# Patient Record
Sex: MALE | Race: WHITE | Employment: OTHER | URBAN - NONMETROPOLITAN AREA
[De-identification: names, ages, dates, MRNs, and addresses within clinical notes are randomized per-mention and may not be internally consistent; named-entity substitution may affect disease eponyms.]

---

## 2017-07-24 ENCOUNTER — HOSPITAL ENCOUNTER (INPATIENT)
Age: 72
LOS: 4 days | Discharge: HOME | DRG: 247 | End: 2017-07-28
Attending: INTERNAL MEDICINE | Admitting: INTERNAL MEDICINE
Payer: OTHER GOVERNMENT

## 2017-07-24 LAB
GLUCOSE BLD-MCNC: 113 MG/DL (ref 70–108)
TROPONIN T: < 0.01 NG/ML

## 2017-07-24 PROCEDURE — 82948 REAGENT STRIP/BLOOD GLUCOSE: CPT

## 2017-07-24 PROCEDURE — 93005 ELECTROCARDIOGRAM TRACING: CPT

## 2017-07-24 PROCEDURE — 84484 ASSAY OF TROPONIN QUANT: CPT

## 2017-07-24 PROCEDURE — 6370000000 HC RX 637 (ALT 250 FOR IP): Performed by: INTERNAL MEDICINE

## 2017-07-24 PROCEDURE — 36415 COLL VENOUS BLD VENIPUNCTURE: CPT

## 2017-07-24 PROCEDURE — 2140000000 HC CCU INTERMEDIATE R&B

## 2017-07-24 PROCEDURE — 2580000003 HC RX 258: Performed by: INTERNAL MEDICINE

## 2017-07-24 RX ORDER — ONDANSETRON 2 MG/ML
4 INJECTION INTRAMUSCULAR; INTRAVENOUS EVERY 6 HOURS PRN
Status: DISCONTINUED | OUTPATIENT
Start: 2017-07-24 | End: 2017-07-26

## 2017-07-24 RX ORDER — POTASSIUM CHLORIDE 20MEQ/15ML
40 LIQUID (ML) ORAL PRN
Status: DISCONTINUED | OUTPATIENT
Start: 2017-07-24 | End: 2017-07-29 | Stop reason: HOSPADM

## 2017-07-24 RX ORDER — FACIAL-BODY WIPES
1 EACH TOPICAL DAILY
Status: DISCONTINUED | OUTPATIENT
Start: 2017-07-25 | End: 2017-07-25 | Stop reason: RX

## 2017-07-24 RX ORDER — NITROGLYCERIN 0.4 MG/1
0.4 TABLET SUBLINGUAL EVERY 5 MIN PRN
Status: DISCONTINUED | OUTPATIENT
Start: 2017-07-24 | End: 2017-07-29 | Stop reason: HOSPADM

## 2017-07-24 RX ORDER — METHOCARBAMOL 500 MG/1
500 TABLET, FILM COATED ORAL EVERY 6 HOURS PRN
Status: DISCONTINUED | OUTPATIENT
Start: 2017-07-24 | End: 2017-07-29 | Stop reason: HOSPADM

## 2017-07-24 RX ORDER — PIOGLITAZONEHYDROCHLORIDE 30 MG/1
30 TABLET ORAL DAILY
Status: DISCONTINUED | OUTPATIENT
Start: 2017-07-25 | End: 2017-07-29 | Stop reason: HOSPADM

## 2017-07-24 RX ORDER — PAROXETINE HYDROCHLORIDE 20 MG/1
20 TABLET, FILM COATED ORAL EVERY MORNING
Status: DISCONTINUED | OUTPATIENT
Start: 2017-07-25 | End: 2017-07-29 | Stop reason: HOSPADM

## 2017-07-24 RX ORDER — GABAPENTIN 300 MG/1
300 CAPSULE ORAL 3 TIMES DAILY
Status: DISCONTINUED | OUTPATIENT
Start: 2017-07-25 | End: 2017-07-26 | Stop reason: DRUGHIGH

## 2017-07-24 RX ORDER — ASPIRIN 81 MG/1
81 TABLET, CHEWABLE ORAL DAILY
Status: DISCONTINUED | OUTPATIENT
Start: 2017-07-25 | End: 2017-07-26

## 2017-07-24 RX ORDER — SODIUM CHLORIDE 0.9 % (FLUSH) 0.9 %
10 SYRINGE (ML) INJECTION EVERY 12 HOURS SCHEDULED
Status: DISCONTINUED | OUTPATIENT
Start: 2017-07-24 | End: 2017-07-27 | Stop reason: SDUPTHER

## 2017-07-24 RX ORDER — INSULIN GLARGINE 100 [IU]/ML
8 INJECTION, SOLUTION SUBCUTANEOUS NIGHTLY
Status: DISCONTINUED | OUTPATIENT
Start: 2017-07-24 | End: 2017-07-29 | Stop reason: HOSPADM

## 2017-07-24 RX ORDER — ALBUTEROL SULFATE 90 UG/1
2 AEROSOL, METERED RESPIRATORY (INHALATION) EVERY 12 HOURS
Status: DISCONTINUED | OUTPATIENT
Start: 2017-07-25 | End: 2017-07-26

## 2017-07-24 RX ORDER — NICOTINE POLACRILEX 4 MG
15 LOZENGE BUCCAL PRN
Status: DISCONTINUED | OUTPATIENT
Start: 2017-07-24 | End: 2017-07-29 | Stop reason: HOSPADM

## 2017-07-24 RX ORDER — DEXTROSE MONOHYDRATE 25 G/50ML
12.5 INJECTION, SOLUTION INTRAVENOUS PRN
Status: DISCONTINUED | OUTPATIENT
Start: 2017-07-24 | End: 2017-07-29 | Stop reason: HOSPADM

## 2017-07-24 RX ORDER — DEXTROSE MONOHYDRATE 50 MG/ML
100 INJECTION, SOLUTION INTRAVENOUS PRN
Status: DISCONTINUED | OUTPATIENT
Start: 2017-07-24 | End: 2017-07-29 | Stop reason: HOSPADM

## 2017-07-24 RX ORDER — SODIUM CHLORIDE 9 MG/ML
INJECTION, SOLUTION INTRAVENOUS CONTINUOUS
Status: DISCONTINUED | OUTPATIENT
Start: 2017-07-24 | End: 2017-07-29 | Stop reason: HOSPADM

## 2017-07-24 RX ORDER — DOCUSATE SODIUM 100 MG/1
100 CAPSULE, LIQUID FILLED ORAL 2 TIMES DAILY
Status: DISCONTINUED | OUTPATIENT
Start: 2017-07-25 | End: 2017-07-26 | Stop reason: DRUGHIGH

## 2017-07-24 RX ORDER — ACETAMINOPHEN 325 MG/1
650 TABLET ORAL EVERY 4 HOURS PRN
Status: DISCONTINUED | OUTPATIENT
Start: 2017-07-24 | End: 2017-07-29 | Stop reason: HOSPADM

## 2017-07-24 RX ORDER — BUDESONIDE AND FORMOTEROL FUMARATE DIHYDRATE 80; 4.5 UG/1; UG/1
2 AEROSOL RESPIRATORY (INHALATION) 2 TIMES DAILY
Status: DISCONTINUED | OUTPATIENT
Start: 2017-07-24 | End: 2017-07-24 | Stop reason: CLARIF

## 2017-07-24 RX ORDER — METHOCARBAMOL 500 MG/1
500 TABLET, FILM COATED ORAL EVERY 6 HOURS PRN
COMMUNITY
End: 2019-07-10 | Stop reason: ALTCHOICE

## 2017-07-24 RX ORDER — ATORVASTATIN CALCIUM 10 MG/1
10 TABLET, FILM COATED ORAL DAILY
Status: DISCONTINUED | OUTPATIENT
Start: 2017-07-25 | End: 2017-07-26 | Stop reason: DRUGHIGH

## 2017-07-24 RX ORDER — POTASSIUM CHLORIDE 20 MEQ/1
40 TABLET, EXTENDED RELEASE ORAL PRN
Status: DISCONTINUED | OUTPATIENT
Start: 2017-07-24 | End: 2017-07-29 | Stop reason: HOSPADM

## 2017-07-24 RX ORDER — POTASSIUM CHLORIDE 7.45 MG/ML
10 INJECTION INTRAVENOUS PRN
Status: DISCONTINUED | OUTPATIENT
Start: 2017-07-24 | End: 2017-07-29 | Stop reason: HOSPADM

## 2017-07-24 RX ORDER — SODIUM CHLORIDE 0.9 % (FLUSH) 0.9 %
10 SYRINGE (ML) INJECTION PRN
Status: DISCONTINUED | OUTPATIENT
Start: 2017-07-24 | End: 2017-07-27 | Stop reason: SDUPTHER

## 2017-07-24 RX ADMIN — SODIUM CHLORIDE: 9 INJECTION, SOLUTION INTRAVENOUS at 23:06

## 2017-07-24 RX ADMIN — Medication 10 ML: at 23:06

## 2017-07-24 RX ADMIN — GABAPENTIN 300 MG: 300 CAPSULE ORAL at 23:38

## 2017-07-24 RX ADMIN — DOCUSATE SODIUM 100 MG: 100 CAPSULE ORAL at 23:38

## 2017-07-24 ASSESSMENT — PAIN SCALES - GENERAL
PAINLEVEL_OUTOF10: 0
PAINLEVEL_OUTOF10: 0

## 2017-07-25 LAB
ABO: NORMAL
ALBUMIN SERPL-MCNC: 3.9 G/DL (ref 3.5–5.1)
ALP BLD-CCNC: 68 U/L (ref 38–126)
ALT SERPL-CCNC: 22 U/L (ref 11–66)
ANION GAP SERPL CALCULATED.3IONS-SCNC: 15 MEQ/L (ref 8–16)
ANTIBODY SCREEN: NORMAL
AST SERPL-CCNC: 22 U/L (ref 5–40)
BILIRUB SERPL-MCNC: 0.7 MG/DL (ref 0.3–1.2)
BILIRUBIN DIRECT: < 0.2 MG/DL (ref 0–0.3)
BUN BLDV-MCNC: 17 MG/DL (ref 7–22)
CALCIUM SERPL-MCNC: 8.7 MG/DL (ref 8.5–10.5)
CHLORIDE BLD-SCNC: 102 MEQ/L (ref 98–111)
CHOLESTEROL, TOTAL: 175 MG/DL (ref 100–199)
CO2: 22 MEQ/L (ref 23–33)
CREAT SERPL-MCNC: 1 MG/DL (ref 0.4–1.2)
GFR SERPL CREATININE-BSD FRML MDRD: 74 ML/MIN/1.73M2
GLUCOSE BLD-MCNC: 135 MG/DL (ref 70–108)
GLUCOSE BLD-MCNC: 140 MG/DL (ref 70–108)
GLUCOSE BLD-MCNC: 150 MG/DL (ref 70–108)
GLUCOSE BLD-MCNC: 296 MG/DL (ref 70–108)
GLUCOSE BLD-MCNC: 85 MG/DL (ref 70–108)
HCT VFR BLD CALC: 39.2 % (ref 42–52)
HDLC SERPL-MCNC: 22 MG/DL
HEMOGLOBIN: 13.8 GM/DL (ref 14–18)
INR BLD: 1.06 (ref 0.85–1.13)
LDL CHOLESTEROL CALCULATED: 89 MG/DL
MCH RBC QN AUTO: 32.3 PG (ref 27–31)
MCHC RBC AUTO-ENTMCNC: 35.2 GM/DL (ref 33–37)
MCV RBC AUTO: 91.7 FL (ref 80–94)
PDW BLD-RTO: 13.4 % (ref 11.5–14.5)
PLATELET # BLD: 181 THOU/MM3 (ref 130–400)
PMV BLD AUTO: 8.8 MCM (ref 7.4–10.4)
POTASSIUM SERPL-SCNC: 4.4 MEQ/L (ref 3.5–5.2)
RBC # BLD: 4.28 MILL/MM3 (ref 4.7–6.1)
RH FACTOR: NORMAL
SODIUM BLD-SCNC: 139 MEQ/L (ref 135–145)
TOTAL PROTEIN: 6.4 G/DL (ref 6.1–8)
TRIGL SERPL-MCNC: 322 MG/DL (ref 0–199)
TROPONIN T: < 0.01 NG/ML
WBC # BLD: 5.9 THOU/MM3 (ref 4.8–10.8)

## 2017-07-25 PROCEDURE — 6370000000 HC RX 637 (ALT 250 FOR IP): Performed by: INTERNAL MEDICINE

## 2017-07-25 PROCEDURE — 84484 ASSAY OF TROPONIN QUANT: CPT

## 2017-07-25 PROCEDURE — 80053 COMPREHEN METABOLIC PANEL: CPT

## 2017-07-25 PROCEDURE — 82248 BILIRUBIN DIRECT: CPT

## 2017-07-25 PROCEDURE — 86850 RBC ANTIBODY SCREEN: CPT

## 2017-07-25 PROCEDURE — 93005 ELECTROCARDIOGRAM TRACING: CPT

## 2017-07-25 PROCEDURE — 80061 LIPID PANEL: CPT

## 2017-07-25 PROCEDURE — 2580000003 HC RX 258: Performed by: INTERNAL MEDICINE

## 2017-07-25 PROCEDURE — 85027 COMPLETE CBC AUTOMATED: CPT

## 2017-07-25 PROCEDURE — 82948 REAGENT STRIP/BLOOD GLUCOSE: CPT

## 2017-07-25 PROCEDURE — 86900 BLOOD TYPING SEROLOGIC ABO: CPT

## 2017-07-25 PROCEDURE — 2140000000 HC CCU INTERMEDIATE R&B

## 2017-07-25 PROCEDURE — 36415 COLL VENOUS BLD VENIPUNCTURE: CPT

## 2017-07-25 PROCEDURE — 85610 PROTHROMBIN TIME: CPT

## 2017-07-25 PROCEDURE — 6360000002 HC RX W HCPCS: Performed by: INTERNAL MEDICINE

## 2017-07-25 PROCEDURE — 86901 BLOOD TYPING SEROLOGIC RH(D): CPT

## 2017-07-25 PROCEDURE — 94640 AIRWAY INHALATION TREATMENT: CPT

## 2017-07-25 RX ORDER — OMEPRAZOLE 20 MG/1
20 CAPSULE, DELAYED RELEASE ORAL DAILY
Status: ON HOLD | COMMUNITY
End: 2019-07-17 | Stop reason: HOSPADM

## 2017-07-25 RX ORDER — ATENOLOL 25 MG/1
25 TABLET ORAL DAILY
Status: DISCONTINUED | OUTPATIENT
Start: 2017-07-26 | End: 2017-07-29 | Stop reason: HOSPADM

## 2017-07-25 RX ORDER — ISOSORBIDE MONONITRATE 30 MG/1
30 TABLET, EXTENDED RELEASE ORAL DAILY
COMMUNITY
End: 2019-07-10 | Stop reason: ALTCHOICE

## 2017-07-25 RX ORDER — CYCLOBENZAPRINE HCL 10 MG
10 TABLET ORAL 3 TIMES DAILY PRN
Status: ON HOLD | COMMUNITY
End: 2018-08-24

## 2017-07-25 RX ORDER — CLOPIDOGREL BISULFATE 75 MG/1
75 TABLET ORAL DAILY
COMMUNITY
End: 2022-05-24 | Stop reason: SDUPTHER

## 2017-07-25 RX ORDER — NITROGLYCERIN 0.4 MG/1
0.4 TABLET SUBLINGUAL EVERY 5 MIN PRN
COMMUNITY
End: 2022-05-24 | Stop reason: SDUPTHER

## 2017-07-25 RX ORDER — NAPROXEN 500 MG/1
500 TABLET ORAL PRN
COMMUNITY

## 2017-07-25 RX ORDER — ATORVASTATIN CALCIUM 80 MG/1
80 TABLET, FILM COATED ORAL DAILY
COMMUNITY
End: 2022-05-24 | Stop reason: SDUPTHER

## 2017-07-25 RX ORDER — ONDANSETRON 4 MG/1
4 TABLET, FILM COATED ORAL EVERY 6 HOURS PRN
COMMUNITY
End: 2019-07-10 | Stop reason: ALTCHOICE

## 2017-07-25 RX ORDER — BUDESONIDE AND FORMOTEROL FUMARATE DIHYDRATE 160; 4.5 UG/1; UG/1
2 AEROSOL RESPIRATORY (INHALATION) 2 TIMES DAILY
Status: ON HOLD | COMMUNITY
End: 2022-06-23 | Stop reason: ALTCHOICE

## 2017-07-25 RX ORDER — ISOSORBIDE MONONITRATE 30 MG/1
30 TABLET, EXTENDED RELEASE ORAL DAILY
Status: DISCONTINUED | OUTPATIENT
Start: 2017-07-26 | End: 2017-07-29 | Stop reason: HOSPADM

## 2017-07-25 RX ORDER — CARVEDILOL 25 MG/1
12.5 TABLET ORAL 2 TIMES DAILY
Status: ON HOLD | COMMUNITY
End: 2019-08-14

## 2017-07-25 RX ORDER — DOCUSATE SODIUM 100 MG/1
100 CAPSULE, LIQUID FILLED ORAL DAILY PRN
COMMUNITY

## 2017-07-25 RX ORDER — CLOPIDOGREL BISULFATE 75 MG/1
75 TABLET ORAL DAILY
Status: DISCONTINUED | OUTPATIENT
Start: 2017-07-26 | End: 2017-07-29 | Stop reason: HOSPADM

## 2017-07-25 RX ORDER — LISINOPRIL 5 MG/1
5 TABLET ORAL DAILY
Status: DISCONTINUED | OUTPATIENT
Start: 2017-07-26 | End: 2017-07-29 | Stop reason: HOSPADM

## 2017-07-25 RX ORDER — LISINOPRIL 5 MG/1
2.5 TABLET ORAL DAILY
COMMUNITY
End: 2019-07-10 | Stop reason: ALTCHOICE

## 2017-07-25 RX ORDER — GABAPENTIN 600 MG/1
600 TABLET ORAL 3 TIMES DAILY
COMMUNITY

## 2017-07-25 RX ADMIN — Medication 6 UNITS: at 12:05

## 2017-07-25 RX ADMIN — ENOXAPARIN SODIUM 40 MG: 40 INJECTION SUBCUTANEOUS at 09:10

## 2017-07-25 RX ADMIN — ASPIRIN 81 MG: 81 TABLET, CHEWABLE ORAL at 09:10

## 2017-07-25 RX ADMIN — GABAPENTIN 300 MG: 300 CAPSULE ORAL at 09:10

## 2017-07-25 RX ADMIN — PAROXETINE HYDROCHLORIDE 20 MG: 20 TABLET, FILM COATED ORAL at 09:10

## 2017-07-25 RX ADMIN — METOPROLOL TARTRATE 25 MG: 25 TABLET, FILM COATED ORAL at 09:10

## 2017-07-25 RX ADMIN — ATORVASTATIN CALCIUM 10 MG: 10 TABLET, FILM COATED ORAL at 22:06

## 2017-07-25 RX ADMIN — DOCUSATE SODIUM 100 MG: 100 CAPSULE ORAL at 22:07

## 2017-07-25 RX ADMIN — Medication 2 PUFF: at 07:51

## 2017-07-25 RX ADMIN — PIOGLITAZONE 30 MG: 30 TABLET ORAL at 09:10

## 2017-07-25 RX ADMIN — GABAPENTIN 300 MG: 300 CAPSULE ORAL at 22:07

## 2017-07-25 RX ADMIN — GABAPENTIN 300 MG: 300 CAPSULE ORAL at 14:59

## 2017-07-25 RX ADMIN — METHOCARBAMOL 500 MG: 500 TABLET ORAL at 14:59

## 2017-07-25 RX ADMIN — DOCUSATE SODIUM 100 MG: 100 CAPSULE ORAL at 09:10

## 2017-07-25 RX ADMIN — Medication 10 ML: at 22:07

## 2017-07-25 RX ADMIN — Medication 2 PUFF: at 22:53

## 2017-07-25 ASSESSMENT — PAIN SCALES - GENERAL
PAINLEVEL_OUTOF10: 0

## 2017-07-26 ENCOUNTER — APPOINTMENT (OUTPATIENT)
Dept: CARDIAC CATH/INVASIVE PROCEDURES | Age: 72
DRG: 247 | End: 2017-07-26
Attending: INTERNAL MEDICINE
Payer: OTHER GOVERNMENT

## 2017-07-26 LAB
ANION GAP SERPL CALCULATED.3IONS-SCNC: 10 MEQ/L (ref 8–16)
BUN BLDV-MCNC: 18 MG/DL (ref 7–22)
CALCIUM SERPL-MCNC: 9.5 MG/DL (ref 8.5–10.5)
CHLORIDE BLD-SCNC: 102 MEQ/L (ref 98–111)
CO2: 27 MEQ/L (ref 23–33)
CREAT SERPL-MCNC: 0.9 MG/DL (ref 0.4–1.2)
EKG ATRIAL RATE: 56 BPM
EKG ATRIAL RATE: 59 BPM
EKG ATRIAL RATE: 60 BPM
EKG P AXIS: 73 DEGREES
EKG P AXIS: 76 DEGREES
EKG P AXIS: 80 DEGREES
EKG P-R INTERVAL: 160 MS
EKG P-R INTERVAL: 162 MS
EKG P-R INTERVAL: 166 MS
EKG Q-T INTERVAL: 424 MS
EKG Q-T INTERVAL: 448 MS
EKG Q-T INTERVAL: 456 MS
EKG QRS DURATION: 94 MS
EKG QRS DURATION: 96 MS
EKG QRS DURATION: 96 MS
EKG QTC CALCULATION (BAZETT): 432 MS
EKG QTC CALCULATION (BAZETT): 451 MS
EKG QTC CALCULATION (BAZETT): 460 MS
EKG R AXIS: 59 DEGREES
EKG R AXIS: 62 DEGREES
EKG R AXIS: 62 DEGREES
EKG T AXIS: 46 DEGREES
EKG T AXIS: 49 DEGREES
EKG T AXIS: 57 DEGREES
EKG VENTRICULAR RATE: 56 BPM
EKG VENTRICULAR RATE: 59 BPM
EKG VENTRICULAR RATE: 71 BPM
GFR SERPL CREATININE-BSD FRML MDRD: 83 ML/MIN/1.73M2
GLUCOSE BLD-MCNC: 138 MG/DL (ref 70–108)
GLUCOSE BLD-MCNC: 139 MG/DL (ref 70–108)
GLUCOSE BLD-MCNC: 184 MG/DL (ref 70–108)
GLUCOSE BLD-MCNC: 198 MG/DL (ref 70–108)
GLUCOSE BLD-MCNC: 220 MG/DL (ref 70–108)
HCT VFR BLD CALC: 43.5 % (ref 42–52)
HEMOGLOBIN: 14.7 GM/DL (ref 14–18)
INR BLD: 1.04 (ref 0.85–1.13)
LV EF: 43 %
LVEF MODALITY: NORMAL
MAGNESIUM: 1.8 MG/DL (ref 1.6–2.4)
MCH RBC QN AUTO: 31.2 PG (ref 27–31)
MCHC RBC AUTO-ENTMCNC: 33.9 GM/DL (ref 33–37)
MCV RBC AUTO: 92.3 FL (ref 80–94)
PDW BLD-RTO: 13.3 % (ref 11.5–14.5)
PHOSPHORUS: 3.8 MG/DL (ref 2.4–4.7)
PLATELET # BLD: 191 THOU/MM3 (ref 130–400)
PMV BLD AUTO: 9.2 MCM (ref 7.4–10.4)
POTASSIUM SERPL-SCNC: 5.2 MEQ/L (ref 3.5–5.2)
RBC # BLD: 4.72 MILL/MM3 (ref 4.7–6.1)
SODIUM BLD-SCNC: 139 MEQ/L (ref 135–145)
WBC # BLD: 8.4 THOU/MM3 (ref 4.8–10.8)

## 2017-07-26 PROCEDURE — A4216 STERILE WATER/SALINE, 10 ML: HCPCS

## 2017-07-26 PROCEDURE — C1894 INTRO/SHEATH, NON-LASER: HCPCS

## 2017-07-26 PROCEDURE — 85027 COMPLETE CBC AUTOMATED: CPT

## 2017-07-26 PROCEDURE — 82948 REAGENT STRIP/BLOOD GLUCOSE: CPT

## 2017-07-26 PROCEDURE — C1769 GUIDE WIRE: HCPCS

## 2017-07-26 PROCEDURE — C1760 CLOSURE DEV, VASC: HCPCS

## 2017-07-26 PROCEDURE — 2580000003 HC RX 258

## 2017-07-26 PROCEDURE — 93306 TTE W/DOPPLER COMPLETE: CPT

## 2017-07-26 PROCEDURE — 92979 ENDOLUMINL IVUS OCT C EA: CPT | Performed by: INTERNAL MEDICINE

## 2017-07-26 PROCEDURE — 2140000000 HC CCU INTERMEDIATE R&B

## 2017-07-26 PROCEDURE — 027034Z DILATION OF CORONARY ARTERY, ONE ARTERY WITH DRUG-ELUTING INTRALUMINAL DEVICE, PERCUTANEOUS APPROACH: ICD-10-PCS | Performed by: INTERNAL MEDICINE

## 2017-07-26 PROCEDURE — 6370000000 HC RX 637 (ALT 250 FOR IP): Performed by: INTERNAL MEDICINE

## 2017-07-26 PROCEDURE — 80048 BASIC METABOLIC PNL TOTAL CA: CPT

## 2017-07-26 PROCEDURE — 85610 PROTHROMBIN TIME: CPT

## 2017-07-26 PROCEDURE — C1753 CATH, INTRAVAS ULTRASOUND: HCPCS

## 2017-07-26 PROCEDURE — 84100 ASSAY OF PHOSPHORUS: CPT

## 2017-07-26 PROCEDURE — 92928 PRQ TCAT PLMT NTRAC ST 1 LES: CPT | Performed by: INTERNAL MEDICINE

## 2017-07-26 PROCEDURE — 93458 L HRT ARTERY/VENTRICLE ANGIO: CPT | Performed by: INTERNAL MEDICINE

## 2017-07-26 PROCEDURE — 83735 ASSAY OF MAGNESIUM: CPT

## 2017-07-26 PROCEDURE — 2500000003 HC RX 250 WO HCPCS

## 2017-07-26 PROCEDURE — C1725 CATH, TRANSLUMIN NON-LASER: HCPCS

## 2017-07-26 PROCEDURE — B2111ZZ FLUOROSCOPY OF MULTIPLE CORONARY ARTERIES USING LOW OSMOLAR CONTRAST: ICD-10-PCS | Performed by: INTERNAL MEDICINE

## 2017-07-26 PROCEDURE — 4A023N7 MEASUREMENT OF CARDIAC SAMPLING AND PRESSURE, LEFT HEART, PERCUTANEOUS APPROACH: ICD-10-PCS | Performed by: INTERNAL MEDICINE

## 2017-07-26 PROCEDURE — B2151ZZ FLUOROSCOPY OF LEFT HEART USING LOW OSMOLAR CONTRAST: ICD-10-PCS | Performed by: INTERNAL MEDICINE

## 2017-07-26 PROCEDURE — 2780000010 HC IMPLANT OTHER

## 2017-07-26 PROCEDURE — 92978 ENDOLUMINL IVUS OCT C 1ST: CPT | Performed by: INTERNAL MEDICINE

## 2017-07-26 PROCEDURE — 2580000003 HC RX 258: Performed by: INTERNAL MEDICINE

## 2017-07-26 PROCEDURE — 94640 AIRWAY INHALATION TREATMENT: CPT

## 2017-07-26 PROCEDURE — 36415 COLL VENOUS BLD VENIPUNCTURE: CPT

## 2017-07-26 PROCEDURE — C1874 STENT, COATED/COV W/DEL SYS: HCPCS

## 2017-07-26 PROCEDURE — C1887 CATHETER, GUIDING: HCPCS

## 2017-07-26 PROCEDURE — 6360000002 HC RX W HCPCS

## 2017-07-26 PROCEDURE — A6258 TRANSPARENT FILM >16<=48 IN: HCPCS

## 2017-07-26 RX ORDER — ATROPINE SULFATE 0.4 MG/ML
0.5 AMPUL (ML) INJECTION
Status: ACTIVE | OUTPATIENT
Start: 2017-07-26 | End: 2017-07-26

## 2017-07-26 RX ORDER — CARVEDILOL 6.25 MG/1
12.5 TABLET ORAL 2 TIMES DAILY
Status: DISCONTINUED | OUTPATIENT
Start: 2017-07-26 | End: 2017-07-29 | Stop reason: HOSPADM

## 2017-07-26 RX ORDER — ACETAMINOPHEN 325 MG/1
650 TABLET ORAL EVERY 4 HOURS PRN
Status: DISCONTINUED | OUTPATIENT
Start: 2017-07-26 | End: 2017-07-26

## 2017-07-26 RX ORDER — ISOSORBIDE MONONITRATE 30 MG/1
30 TABLET, EXTENDED RELEASE ORAL DAILY
Status: DISCONTINUED | OUTPATIENT
Start: 2017-07-26 | End: 2017-07-26 | Stop reason: SDUPTHER

## 2017-07-26 RX ORDER — SODIUM CHLORIDE 0.9 % (FLUSH) 0.9 %
10 SYRINGE (ML) INJECTION EVERY 12 HOURS SCHEDULED
Status: DISCONTINUED | OUTPATIENT
Start: 2017-07-26 | End: 2017-07-29 | Stop reason: HOSPADM

## 2017-07-26 RX ORDER — NAPROXEN 250 MG/1
500 TABLET ORAL 2 TIMES DAILY
Status: DISCONTINUED | OUTPATIENT
Start: 2017-07-26 | End: 2017-07-29 | Stop reason: HOSPADM

## 2017-07-26 RX ORDER — LISINOPRIL 5 MG/1
5 TABLET ORAL DAILY
Status: DISCONTINUED | OUTPATIENT
Start: 2017-07-26 | End: 2017-07-26 | Stop reason: SDUPTHER

## 2017-07-26 RX ORDER — ASPIRIN 325 MG
325 TABLET ORAL DAILY
Status: DISCONTINUED | OUTPATIENT
Start: 2017-07-27 | End: 2017-07-29 | Stop reason: HOSPADM

## 2017-07-26 RX ORDER — ATORVASTATIN CALCIUM 40 MG/1
40 TABLET, FILM COATED ORAL NIGHTLY
Status: DISCONTINUED | OUTPATIENT
Start: 2017-07-26 | End: 2017-07-29 | Stop reason: HOSPADM

## 2017-07-26 RX ORDER — DOCUSATE SODIUM 100 MG/1
100 CAPSULE, LIQUID FILLED ORAL DAILY
Status: DISCONTINUED | OUTPATIENT
Start: 2017-07-26 | End: 2017-07-29 | Stop reason: HOSPADM

## 2017-07-26 RX ORDER — CYCLOBENZAPRINE HCL 10 MG
10 TABLET ORAL 3 TIMES DAILY PRN
Status: DISCONTINUED | OUTPATIENT
Start: 2017-07-26 | End: 2017-07-29 | Stop reason: HOSPADM

## 2017-07-26 RX ORDER — ALBUTEROL SULFATE 90 UG/1
2 AEROSOL, METERED RESPIRATORY (INHALATION) 2 TIMES DAILY
Status: DISCONTINUED | OUTPATIENT
Start: 2017-07-26 | End: 2017-07-29 | Stop reason: HOSPADM

## 2017-07-26 RX ORDER — SODIUM CHLORIDE 0.9 % (FLUSH) 0.9 %
10 SYRINGE (ML) INJECTION PRN
Status: DISCONTINUED | OUTPATIENT
Start: 2017-07-26 | End: 2017-07-29 | Stop reason: HOSPADM

## 2017-07-26 RX ORDER — ONDANSETRON 2 MG/ML
4 INJECTION INTRAMUSCULAR; INTRAVENOUS EVERY 6 HOURS PRN
Status: DISCONTINUED | OUTPATIENT
Start: 2017-07-26 | End: 2017-07-29 | Stop reason: HOSPADM

## 2017-07-26 RX ORDER — AMIODARONE HYDROCHLORIDE 200 MG/1
200 TABLET ORAL 2 TIMES DAILY
Status: DISCONTINUED | OUTPATIENT
Start: 2017-07-26 | End: 2017-07-29 | Stop reason: HOSPADM

## 2017-07-26 RX ORDER — NITROGLYCERIN 0.4 MG/1
0.4 TABLET SUBLINGUAL EVERY 5 MIN PRN
Status: DISCONTINUED | OUTPATIENT
Start: 2017-07-26 | End: 2017-07-26 | Stop reason: SDUPTHER

## 2017-07-26 RX ORDER — GABAPENTIN 600 MG/1
600 TABLET ORAL 3 TIMES DAILY
Status: DISCONTINUED | OUTPATIENT
Start: 2017-07-26 | End: 2017-07-29 | Stop reason: HOSPADM

## 2017-07-26 RX ORDER — CLOPIDOGREL BISULFATE 75 MG/1
75 TABLET ORAL DAILY
Status: DISCONTINUED | OUTPATIENT
Start: 2017-07-26 | End: 2017-07-26 | Stop reason: SDUPTHER

## 2017-07-26 RX ORDER — ONDANSETRON 4 MG/1
4 TABLET, FILM COATED ORAL EVERY 6 HOURS PRN
Status: DISCONTINUED | OUTPATIENT
Start: 2017-07-26 | End: 2017-07-29 | Stop reason: HOSPADM

## 2017-07-26 RX ORDER — SODIUM CHLORIDE 9 MG/ML
INJECTION, SOLUTION INTRAVENOUS CONTINUOUS
Status: ACTIVE | OUTPATIENT
Start: 2017-07-26 | End: 2017-07-26

## 2017-07-26 RX ADMIN — Medication 2 UNITS: at 17:25

## 2017-07-26 RX ADMIN — AMIODARONE HYDROCHLORIDE 200 MG: 200 TABLET ORAL at 20:24

## 2017-07-26 RX ADMIN — ATENOLOL 25 MG: 25 TABLET ORAL at 12:21

## 2017-07-26 RX ADMIN — LISINOPRIL 5 MG: 5 TABLET ORAL at 12:21

## 2017-07-26 RX ADMIN — NAPROXEN 500 MG: 250 TABLET ORAL at 20:24

## 2017-07-26 RX ADMIN — Medication 10 ML: at 11:04

## 2017-07-26 RX ADMIN — ISOSORBIDE MONONITRATE 30 MG: 30 TABLET ORAL at 11:04

## 2017-07-26 RX ADMIN — AMIODARONE HYDROCHLORIDE 200 MG: 200 TABLET ORAL at 13:35

## 2017-07-26 RX ADMIN — DOCUSATE SODIUM 100 MG: 100 CAPSULE ORAL at 11:04

## 2017-07-26 RX ADMIN — CLOPIDOGREL 75 MG: 75 TABLET, FILM COATED ORAL at 06:54

## 2017-07-26 RX ADMIN — ATORVASTATIN CALCIUM 40 MG: 40 TABLET, FILM COATED ORAL at 20:24

## 2017-07-26 RX ADMIN — Medication 2 PUFF: at 20:42

## 2017-07-26 RX ADMIN — PAROXETINE HYDROCHLORIDE 20 MG: 20 TABLET, FILM COATED ORAL at 11:04

## 2017-07-26 RX ADMIN — SODIUM CHLORIDE: 9 INJECTION, SOLUTION INTRAVENOUS at 11:08

## 2017-07-26 RX ADMIN — GABAPENTIN 600 MG: 600 TABLET ORAL at 11:16

## 2017-07-26 RX ADMIN — GABAPENTIN 600 MG: 600 TABLET ORAL at 15:58

## 2017-07-26 RX ADMIN — ASPIRIN 81 MG: 81 TABLET, CHEWABLE ORAL at 06:54

## 2017-07-26 RX ADMIN — Medication 10 ML: at 20:24

## 2017-07-26 RX ADMIN — PIOGLITAZONE 30 MG: 30 TABLET ORAL at 11:05

## 2017-07-26 RX ADMIN — CARVEDILOL 12.5 MG: 6.25 TABLET, FILM COATED ORAL at 11:16

## 2017-07-26 RX ADMIN — INSULIN GLARGINE 8 UNITS: 100 INJECTION, SOLUTION SUBCUTANEOUS at 21:30

## 2017-07-26 RX ADMIN — GABAPENTIN 600 MG: 600 TABLET ORAL at 20:23

## 2017-07-26 RX ADMIN — Medication 4 UNITS: at 21:31

## 2017-07-26 ASSESSMENT — PAIN SCALES - GENERAL
PAINLEVEL_OUTOF10: 0

## 2017-07-27 LAB
GLUCOSE BLD-MCNC: 128 MG/DL (ref 70–108)
GLUCOSE BLD-MCNC: 136 MG/DL (ref 70–108)
GLUCOSE BLD-MCNC: 156 MG/DL (ref 70–108)
GLUCOSE BLD-MCNC: 164 MG/DL (ref 70–108)

## 2017-07-27 PROCEDURE — 6370000000 HC RX 637 (ALT 250 FOR IP): Performed by: INTERNAL MEDICINE

## 2017-07-27 PROCEDURE — 6360000002 HC RX W HCPCS: Performed by: INTERNAL MEDICINE

## 2017-07-27 PROCEDURE — 2580000003 HC RX 258: Performed by: INTERNAL MEDICINE

## 2017-07-27 PROCEDURE — 82948 REAGENT STRIP/BLOOD GLUCOSE: CPT

## 2017-07-27 PROCEDURE — 94640 AIRWAY INHALATION TREATMENT: CPT

## 2017-07-27 PROCEDURE — 2140000000 HC CCU INTERMEDIATE R&B

## 2017-07-27 RX ADMIN — CLOPIDOGREL 75 MG: 75 TABLET, FILM COATED ORAL at 09:42

## 2017-07-27 RX ADMIN — Medication 2 UNITS: at 21:41

## 2017-07-27 RX ADMIN — DOCUSATE SODIUM 100 MG: 100 CAPSULE ORAL at 09:42

## 2017-07-27 RX ADMIN — Medication 2 UNITS: at 12:36

## 2017-07-27 RX ADMIN — Medication 2 PUFF: at 10:53

## 2017-07-27 RX ADMIN — ATENOLOL 25 MG: 25 TABLET ORAL at 12:36

## 2017-07-27 RX ADMIN — Medication 10 ML: at 09:43

## 2017-07-27 RX ADMIN — CARVEDILOL 12.5 MG: 6.25 TABLET, FILM COATED ORAL at 07:40

## 2017-07-27 RX ADMIN — LISINOPRIL 5 MG: 5 TABLET ORAL at 12:36

## 2017-07-27 RX ADMIN — Medication 2 PUFF: at 21:45

## 2017-07-27 RX ADMIN — GABAPENTIN 600 MG: 600 TABLET ORAL at 20:32

## 2017-07-27 RX ADMIN — PAROXETINE HYDROCHLORIDE 20 MG: 20 TABLET, FILM COATED ORAL at 09:42

## 2017-07-27 RX ADMIN — AMIODARONE HYDROCHLORIDE 200 MG: 200 TABLET ORAL at 20:32

## 2017-07-27 RX ADMIN — ENOXAPARIN SODIUM 40 MG: 40 INJECTION SUBCUTANEOUS at 09:42

## 2017-07-27 RX ADMIN — ISOSORBIDE MONONITRATE 30 MG: 30 TABLET ORAL at 07:40

## 2017-07-27 RX ADMIN — GABAPENTIN 600 MG: 600 TABLET ORAL at 09:42

## 2017-07-27 RX ADMIN — Medication 10 ML: at 20:32

## 2017-07-27 RX ADMIN — ASPIRIN 325 MG: 325 TABLET, COATED ORAL at 09:42

## 2017-07-27 RX ADMIN — NAPROXEN 500 MG: 250 TABLET ORAL at 20:31

## 2017-07-27 RX ADMIN — AMIODARONE HYDROCHLORIDE 200 MG: 200 TABLET ORAL at 09:42

## 2017-07-27 RX ADMIN — ATORVASTATIN CALCIUM 40 MG: 40 TABLET, FILM COATED ORAL at 20:32

## 2017-07-27 RX ADMIN — PIOGLITAZONE 30 MG: 30 TABLET ORAL at 09:42

## 2017-07-27 RX ADMIN — CARVEDILOL 12.5 MG: 6.25 TABLET, FILM COATED ORAL at 20:32

## 2017-07-27 RX ADMIN — INSULIN GLARGINE 8 UNITS: 100 INJECTION, SOLUTION SUBCUTANEOUS at 21:41

## 2017-07-27 ASSESSMENT — PAIN SCALES - GENERAL: PAINLEVEL_OUTOF10: 0

## 2017-07-28 VITALS
SYSTOLIC BLOOD PRESSURE: 115 MMHG | HEIGHT: 72 IN | TEMPERATURE: 97.8 F | RESPIRATION RATE: 18 BRPM | OXYGEN SATURATION: 97 % | BODY MASS INDEX: 30.12 KG/M2 | HEART RATE: 74 BPM | WEIGHT: 222.4 LBS | DIASTOLIC BLOOD PRESSURE: 55 MMHG

## 2017-07-28 PROBLEM — E66.09 OBESITY DUE TO EXCESS CALORIES: Status: ACTIVE | Noted: 2017-07-28

## 2017-07-28 PROBLEM — Z86.79 H/O CLASS III ANGINA PECTORIS: Status: ACTIVE | Noted: 2017-07-28

## 2017-07-28 PROBLEM — I48.0 PAROXYSMAL ATRIAL FIBRILLATION (HCC): Status: ACTIVE | Noted: 2017-07-28

## 2017-07-28 PROBLEM — I49.9 VENTRICULAR ARRHYTHMIA: Status: ACTIVE | Noted: 2017-07-28

## 2017-07-28 LAB
EKG ATRIAL RATE: 72 BPM
EKG P AXIS: 63 DEGREES
EKG P-R INTERVAL: 170 MS
EKG Q-T INTERVAL: 400 MS
EKG QRS DURATION: 98 MS
EKG QTC CALCULATION (BAZETT): 438 MS
EKG R AXIS: 57 DEGREES
EKG T AXIS: 61 DEGREES
EKG VENTRICULAR RATE: 72 BPM
GLUCOSE BLD-MCNC: 100 MG/DL (ref 70–108)
GLUCOSE BLD-MCNC: 128 MG/DL (ref 70–108)
GLUCOSE BLD-MCNC: 133 MG/DL (ref 70–108)
GLUCOSE BLD-MCNC: 214 MG/DL (ref 70–108)

## 2017-07-28 PROCEDURE — 2580000003 HC RX 258: Performed by: INTERNAL MEDICINE

## 2017-07-28 PROCEDURE — 82948 REAGENT STRIP/BLOOD GLUCOSE: CPT

## 2017-07-28 PROCEDURE — 94640 AIRWAY INHALATION TREATMENT: CPT

## 2017-07-28 PROCEDURE — 93005 ELECTROCARDIOGRAM TRACING: CPT

## 2017-07-28 PROCEDURE — 6370000000 HC RX 637 (ALT 250 FOR IP): Performed by: INTERNAL MEDICINE

## 2017-07-28 PROCEDURE — 6360000002 HC RX W HCPCS: Performed by: INTERNAL MEDICINE

## 2017-07-28 RX ORDER — AMIODARONE HYDROCHLORIDE 200 MG/1
200 TABLET ORAL 2 TIMES DAILY
Qty: 60 TABLET | Refills: 3 | Status: ON HOLD | OUTPATIENT
Start: 2017-07-28 | End: 2021-10-07 | Stop reason: ALTCHOICE

## 2017-07-28 RX ORDER — MORPHINE SULFATE 2 MG/ML
2 INJECTION, SOLUTION INTRAMUSCULAR; INTRAVENOUS EVERY 4 HOURS PRN
Status: DISCONTINUED | OUTPATIENT
Start: 2017-07-28 | End: 2017-07-29 | Stop reason: HOSPADM

## 2017-07-28 RX ORDER — BUMETANIDE 2 MG/1
2 TABLET ORAL DAILY
Qty: 30 TABLET | Refills: 3 | Status: ON HOLD | OUTPATIENT
Start: 2017-07-28 | End: 2019-07-18 | Stop reason: SDUPTHER

## 2017-07-28 RX ORDER — POTASSIUM CHLORIDE 20 MEQ/1
20 TABLET, EXTENDED RELEASE ORAL DAILY
Qty: 60 TABLET | Refills: 3 | Status: SHIPPED | OUTPATIENT
Start: 2017-07-28 | End: 2019-07-10 | Stop reason: ALTCHOICE

## 2017-07-28 RX ADMIN — Medication 2 PUFF: at 19:44

## 2017-07-28 RX ADMIN — GABAPENTIN 600 MG: 600 TABLET ORAL at 10:20

## 2017-07-28 RX ADMIN — PIOGLITAZONE 30 MG: 30 TABLET ORAL at 10:21

## 2017-07-28 RX ADMIN — Medication 2 PUFF: at 10:02

## 2017-07-28 RX ADMIN — LISINOPRIL 5 MG: 5 TABLET ORAL at 10:22

## 2017-07-28 RX ADMIN — NITROGLYCERIN 0.4 MG: 0.4 TABLET SUBLINGUAL at 00:24

## 2017-07-28 RX ADMIN — ATORVASTATIN CALCIUM 40 MG: 40 TABLET, FILM COATED ORAL at 21:01

## 2017-07-28 RX ADMIN — MORPHINE SULFATE 2 MG: 2 INJECTION, SOLUTION INTRAMUSCULAR; INTRAVENOUS at 01:35

## 2017-07-28 RX ADMIN — NAPROXEN 500 MG: 250 TABLET ORAL at 10:21

## 2017-07-28 RX ADMIN — NITROGLYCERIN 0.4 MG: 0.4 TABLET SUBLINGUAL at 00:55

## 2017-07-28 RX ADMIN — CARVEDILOL 12.5 MG: 6.25 TABLET, FILM COATED ORAL at 21:01

## 2017-07-28 RX ADMIN — NITROGLYCERIN 0.4 MG: 0.4 TABLET SUBLINGUAL at 00:33

## 2017-07-28 RX ADMIN — GABAPENTIN 600 MG: 600 TABLET ORAL at 15:39

## 2017-07-28 RX ADMIN — ATENOLOL 25 MG: 25 TABLET ORAL at 12:51

## 2017-07-28 RX ADMIN — DOCUSATE SODIUM 100 MG: 100 CAPSULE ORAL at 10:20

## 2017-07-28 RX ADMIN — AMIODARONE HYDROCHLORIDE 200 MG: 200 TABLET ORAL at 21:01

## 2017-07-28 RX ADMIN — AMIODARONE HYDROCHLORIDE 200 MG: 200 TABLET ORAL at 10:18

## 2017-07-28 RX ADMIN — GABAPENTIN 600 MG: 600 TABLET ORAL at 21:01

## 2017-07-28 RX ADMIN — NAPROXEN 500 MG: 250 TABLET ORAL at 21:01

## 2017-07-28 RX ADMIN — INSULIN GLARGINE 8 UNITS: 100 INJECTION, SOLUTION SUBCUTANEOUS at 21:02

## 2017-07-28 RX ADMIN — Medication 10 ML: at 10:21

## 2017-07-28 RX ADMIN — Medication 4 UNITS: at 21:01

## 2017-07-28 RX ADMIN — CLOPIDOGREL 75 MG: 75 TABLET, FILM COATED ORAL at 10:20

## 2017-07-28 RX ADMIN — ISOSORBIDE MONONITRATE 30 MG: 30 TABLET ORAL at 12:51

## 2017-07-28 RX ADMIN — CARVEDILOL 12.5 MG: 6.25 TABLET, FILM COATED ORAL at 10:18

## 2017-07-28 RX ADMIN — ASPIRIN 325 MG: 325 TABLET, COATED ORAL at 10:18

## 2017-07-28 RX ADMIN — PAROXETINE HYDROCHLORIDE 20 MG: 20 TABLET, FILM COATED ORAL at 10:21

## 2017-07-28 ASSESSMENT — PAIN SCALES - GENERAL
PAINLEVEL_OUTOF10: 0
PAINLEVEL_OUTOF10: 5
PAINLEVEL_OUTOF10: 0
PAINLEVEL_OUTOF10: 5
PAINLEVEL_OUTOF10: 0
PAINLEVEL_OUTOF10: 0

## 2017-07-28 ASSESSMENT — PAIN DESCRIPTION - PAIN TYPE: TYPE: ACUTE PAIN

## 2017-07-28 ASSESSMENT — PAIN DESCRIPTION - LOCATION: LOCATION: CHEST

## 2017-07-28 ASSESSMENT — PAIN DESCRIPTION - ORIENTATION: ORIENTATION: MID

## 2017-07-28 ASSESSMENT — PAIN DESCRIPTION - DESCRIPTORS: DESCRIPTORS: PRESSURE

## 2017-08-03 ENCOUNTER — HOSPITAL ENCOUNTER (EMERGENCY)
Age: 72
Discharge: HOME | End: 2017-08-04
Payer: MEDICARE

## 2017-08-03 ENCOUNTER — APPOINTMENT (OUTPATIENT)
Dept: GENERAL RADIOLOGY | Age: 72
End: 2017-08-03
Payer: MEDICARE

## 2017-08-03 DIAGNOSIS — E86.0 DEHYDRATION: Primary | ICD-10-CM

## 2017-08-03 LAB
ALBUMIN SERPL-MCNC: 3.8 G/DL (ref 3.5–5.1)
ALP BLD-CCNC: 73 U/L (ref 38–126)
ALT SERPL-CCNC: 14 U/L (ref 11–66)
ANION GAP SERPL CALCULATED.3IONS-SCNC: 13 MEQ/L (ref 8–16)
AST SERPL-CCNC: 17 U/L (ref 5–40)
BASOPHILS # BLD: 0.3 %
BASOPHILS ABSOLUTE: 0 THOU/MM3 (ref 0–0.1)
BILIRUB SERPL-MCNC: 0.4 MG/DL (ref 0.3–1.2)
BUN BLDV-MCNC: 36 MG/DL (ref 7–22)
CALCIUM SERPL-MCNC: 7.5 MG/DL (ref 8.5–10.5)
CHLORIDE BLD-SCNC: 93 MEQ/L (ref 98–111)
CO2: 26 MEQ/L (ref 23–33)
CREAT SERPL-MCNC: 2.3 MG/DL (ref 0.4–1.2)
EOSINOPHIL # BLD: 2.8 %
EOSINOPHILS ABSOLUTE: 0.2 THOU/MM3 (ref 0–0.4)
GFR SERPL CREATININE-BSD FRML MDRD: 28 ML/MIN/1.73M2
GLUCOSE BLD-MCNC: 130 MG/DL (ref 70–108)
HCT VFR BLD CALC: 35.3 % (ref 42–52)
HEMOGLOBIN: 12 GM/DL (ref 14–18)
LACTIC ACID: 1.2 MMOL/L (ref 0.5–2.2)
LYMPHOCYTES # BLD: 24.4 %
LYMPHOCYTES ABSOLUTE: 1.9 THOU/MM3 (ref 1–4.8)
MCH RBC QN AUTO: 31.6 PG (ref 27–31)
MCHC RBC AUTO-ENTMCNC: 34.1 GM/DL (ref 33–37)
MCV RBC AUTO: 92.6 FL (ref 80–94)
MONOCYTES # BLD: 6.9 %
MONOCYTES ABSOLUTE: 0.5 THOU/MM3 (ref 0.4–1.3)
NUCLEATED RED BLOOD CELLS: 0 /100 WBC
OSMOLALITY CALCULATION: 274.6 MOSMOL/KG (ref 275–300)
PDW BLD-RTO: 13.4 % (ref 11.5–14.5)
PLATELET # BLD: 201 THOU/MM3 (ref 130–400)
PMV BLD AUTO: 9.3 MCM (ref 7.4–10.4)
POTASSIUM SERPL-SCNC: 4.3 MEQ/L (ref 3.5–5.2)
RBC # BLD: 3.81 MILL/MM3 (ref 4.7–6.1)
RBC # BLD: NORMAL 10*6/UL
SEG NEUTROPHILS: 65.6 %
SEGMENTED NEUTROPHILS ABSOLUTE COUNT: 5 THOU/MM3 (ref 1.8–7.7)
SODIUM BLD-SCNC: 132 MEQ/L (ref 135–145)
TOTAL PROTEIN: 6 G/DL (ref 6.1–8)
WBC # BLD: 7.6 THOU/MM3 (ref 4.8–10.8)

## 2017-08-03 PROCEDURE — 87040 BLOOD CULTURE FOR BACTERIA: CPT

## 2017-08-03 PROCEDURE — 36415 COLL VENOUS BLD VENIPUNCTURE: CPT

## 2017-08-03 PROCEDURE — 71020 XR CHEST STANDARD TWO VW: CPT

## 2017-08-03 PROCEDURE — 83605 ASSAY OF LACTIC ACID: CPT

## 2017-08-03 PROCEDURE — 99285 EMERGENCY DEPT VISIT HI MDM: CPT

## 2017-08-03 PROCEDURE — 80053 COMPREHEN METABOLIC PANEL: CPT

## 2017-08-03 PROCEDURE — 96360 HYDRATION IV INFUSION INIT: CPT

## 2017-08-03 PROCEDURE — 85025 COMPLETE CBC W/AUTO DIFF WBC: CPT

## 2017-08-03 PROCEDURE — 2580000003 HC RX 258: Performed by: PHYSICIAN ASSISTANT

## 2017-08-03 PROCEDURE — 96361 HYDRATE IV INFUSION ADD-ON: CPT

## 2017-08-03 PROCEDURE — 93005 ELECTROCARDIOGRAM TRACING: CPT

## 2017-08-03 RX ORDER — 0.9 % SODIUM CHLORIDE 0.9 %
1000 INTRAVENOUS SOLUTION INTRAVENOUS ONCE
Status: COMPLETED | OUTPATIENT
Start: 2017-08-03 | End: 2017-08-03

## 2017-08-03 RX ADMIN — SODIUM CHLORIDE 1000 ML: 9 INJECTION, SOLUTION INTRAVENOUS at 21:00

## 2017-08-03 ASSESSMENT — ENCOUNTER SYMPTOMS
RHINORRHEA: 0
ABDOMINAL DISTENTION: 1
DIARRHEA: 0
SHORTNESS OF BREATH: 0
EYE DISCHARGE: 0
SORE THROAT: 0
EYE REDNESS: 0
BACK PAIN: 0
COUGH: 0
NAUSEA: 0
WHEEZING: 0
VOMITING: 0
ABDOMINAL PAIN: 0

## 2017-08-04 VITALS
HEIGHT: 72 IN | WEIGHT: 222 LBS | OXYGEN SATURATION: 94 % | TEMPERATURE: 98.5 F | HEART RATE: 62 BPM | BODY MASS INDEX: 30.07 KG/M2 | SYSTOLIC BLOOD PRESSURE: 110 MMHG | DIASTOLIC BLOOD PRESSURE: 75 MMHG | RESPIRATION RATE: 16 BRPM

## 2017-08-04 LAB
EKG ATRIAL RATE: 67 BPM
EKG P AXIS: 69 DEGREES
EKG P-R INTERVAL: 168 MS
EKG Q-T INTERVAL: 418 MS
EKG QRS DURATION: 106 MS
EKG QTC CALCULATION (BAZETT): 441 MS
EKG R AXIS: 64 DEGREES
EKG T AXIS: 17 DEGREES
EKG VENTRICULAR RATE: 67 BPM

## 2017-08-09 LAB
BLOOD CULTURE, ROUTINE: NORMAL
BLOOD CULTURE, ROUTINE: NORMAL

## 2017-08-10 ENCOUNTER — HOSPITAL ENCOUNTER (OUTPATIENT)
Dept: CARDIAC REHAB | Age: 72
Setting detail: THERAPIES SERIES
Discharge: HOME | End: 2017-08-10
Payer: MEDICARE

## 2017-08-10 VITALS — BODY MASS INDEX: 30.37 KG/M2 | WEIGHT: 224.25 LBS | HEIGHT: 72 IN

## 2017-08-10 PROCEDURE — G0423 INTENS CARDIAC REHAB NO EXER: HCPCS

## 2017-08-21 ENCOUNTER — HOSPITAL ENCOUNTER (OUTPATIENT)
Dept: CARDIAC REHAB | Age: 72
Setting detail: THERAPIES SERIES
Discharge: HOME | End: 2017-08-21
Payer: MEDICARE

## 2017-08-21 PROCEDURE — G0422 INTENS CARDIAC REHAB W/EXERC: HCPCS

## 2017-08-21 PROCEDURE — G0423 INTENS CARDIAC REHAB NO EXER: HCPCS

## 2017-08-23 ENCOUNTER — HOSPITAL ENCOUNTER (OUTPATIENT)
Dept: CARDIAC REHAB | Age: 72
Setting detail: THERAPIES SERIES
Discharge: HOME | End: 2017-08-23
Payer: MEDICARE

## 2017-08-23 ENCOUNTER — TELEPHONE (OUTPATIENT)
Dept: CARDIAC REHAB | Age: 72
End: 2017-08-23

## 2017-08-23 PROCEDURE — G0423 INTENS CARDIAC REHAB NO EXER: HCPCS

## 2017-08-23 PROCEDURE — G0422 INTENS CARDIAC REHAB W/EXERC: HCPCS

## 2017-08-25 ENCOUNTER — HOSPITAL ENCOUNTER (OUTPATIENT)
Dept: CARDIAC REHAB | Age: 72
Setting detail: THERAPIES SERIES
Discharge: HOME | End: 2017-08-25
Payer: MEDICARE

## 2017-08-25 PROCEDURE — G0422 INTENS CARDIAC REHAB W/EXERC: HCPCS

## 2017-08-25 PROCEDURE — G0423 INTENS CARDIAC REHAB NO EXER: HCPCS

## 2017-08-28 ENCOUNTER — HOSPITAL ENCOUNTER (OUTPATIENT)
Dept: CARDIAC REHAB | Age: 72
Setting detail: THERAPIES SERIES
Discharge: HOME | End: 2017-08-28
Payer: MEDICARE

## 2017-08-28 PROCEDURE — G0423 INTENS CARDIAC REHAB NO EXER: HCPCS

## 2017-08-28 PROCEDURE — G0422 INTENS CARDIAC REHAB W/EXERC: HCPCS

## 2017-08-30 ENCOUNTER — HOSPITAL ENCOUNTER (OUTPATIENT)
Dept: CARDIAC REHAB | Age: 72
Setting detail: THERAPIES SERIES
Discharge: HOME | End: 2017-08-30
Payer: MEDICARE

## 2017-08-30 PROCEDURE — G0423 INTENS CARDIAC REHAB NO EXER: HCPCS

## 2017-08-30 PROCEDURE — G0422 INTENS CARDIAC REHAB W/EXERC: HCPCS

## 2017-09-01 ENCOUNTER — HOSPITAL ENCOUNTER (OUTPATIENT)
Dept: CARDIAC REHAB | Age: 72
Setting detail: THERAPIES SERIES
Discharge: HOME | End: 2017-09-01
Payer: MEDICARE

## 2017-09-01 PROCEDURE — G0422 INTENS CARDIAC REHAB W/EXERC: HCPCS

## 2017-09-01 PROCEDURE — G0423 INTENS CARDIAC REHAB NO EXER: HCPCS

## 2017-09-04 ENCOUNTER — APPOINTMENT (OUTPATIENT)
Dept: CARDIAC REHAB | Age: 72
End: 2017-09-04
Payer: MEDICARE

## 2017-09-06 ENCOUNTER — HOSPITAL ENCOUNTER (OUTPATIENT)
Dept: CARDIAC REHAB | Age: 72
Setting detail: THERAPIES SERIES
Discharge: HOME | End: 2017-09-06
Payer: MEDICARE

## 2017-09-06 PROCEDURE — G0422 INTENS CARDIAC REHAB W/EXERC: HCPCS

## 2017-09-06 PROCEDURE — G0423 INTENS CARDIAC REHAB NO EXER: HCPCS

## 2017-09-08 ENCOUNTER — HOSPITAL ENCOUNTER (OUTPATIENT)
Dept: CARDIAC REHAB | Age: 72
Setting detail: THERAPIES SERIES
Discharge: HOME | End: 2017-09-08
Payer: MEDICARE

## 2017-09-08 PROCEDURE — G0423 INTENS CARDIAC REHAB NO EXER: HCPCS

## 2017-09-08 PROCEDURE — G0422 INTENS CARDIAC REHAB W/EXERC: HCPCS

## 2017-09-11 ENCOUNTER — HOSPITAL ENCOUNTER (OUTPATIENT)
Dept: CARDIAC REHAB | Age: 72
Setting detail: THERAPIES SERIES
Discharge: HOME | End: 2017-09-11
Payer: MEDICARE

## 2017-09-11 PROCEDURE — G0423 INTENS CARDIAC REHAB NO EXER: HCPCS

## 2017-09-11 PROCEDURE — G0422 INTENS CARDIAC REHAB W/EXERC: HCPCS

## 2017-09-13 ENCOUNTER — HOSPITAL ENCOUNTER (OUTPATIENT)
Dept: CARDIAC REHAB | Age: 72
Setting detail: THERAPIES SERIES
Discharge: HOME | End: 2017-09-13
Payer: MEDICARE

## 2017-09-13 PROCEDURE — G0423 INTENS CARDIAC REHAB NO EXER: HCPCS

## 2017-09-13 PROCEDURE — G0422 INTENS CARDIAC REHAB W/EXERC: HCPCS

## 2017-09-15 ENCOUNTER — HOSPITAL ENCOUNTER (OUTPATIENT)
Dept: CARDIAC REHAB | Age: 72
Setting detail: THERAPIES SERIES
Discharge: HOME | End: 2017-09-15
Payer: MEDICARE

## 2017-09-15 PROCEDURE — G0422 INTENS CARDIAC REHAB W/EXERC: HCPCS

## 2017-09-15 PROCEDURE — G0423 INTENS CARDIAC REHAB NO EXER: HCPCS

## 2017-09-18 ENCOUNTER — HOSPITAL ENCOUNTER (OUTPATIENT)
Dept: CARDIAC REHAB | Age: 72
Setting detail: THERAPIES SERIES
Discharge: HOME | End: 2017-09-18
Payer: MEDICARE

## 2017-09-18 PROCEDURE — G0422 INTENS CARDIAC REHAB W/EXERC: HCPCS

## 2017-09-18 PROCEDURE — G0423 INTENS CARDIAC REHAB NO EXER: HCPCS

## 2017-09-20 ENCOUNTER — HOSPITAL ENCOUNTER (OUTPATIENT)
Dept: CARDIAC REHAB | Age: 72
Setting detail: THERAPIES SERIES
Discharge: HOME | End: 2017-09-20
Payer: MEDICARE

## 2017-09-20 PROCEDURE — G0423 INTENS CARDIAC REHAB NO EXER: HCPCS

## 2017-09-20 PROCEDURE — G0422 INTENS CARDIAC REHAB W/EXERC: HCPCS

## 2017-09-22 ENCOUNTER — HOSPITAL ENCOUNTER (OUTPATIENT)
Dept: CARDIAC REHAB | Age: 72
Setting detail: THERAPIES SERIES
Discharge: HOME | End: 2017-09-22
Payer: MEDICARE

## 2017-09-22 PROCEDURE — G0423 INTENS CARDIAC REHAB NO EXER: HCPCS

## 2017-09-22 PROCEDURE — G0422 INTENS CARDIAC REHAB W/EXERC: HCPCS

## 2017-09-25 ENCOUNTER — HOSPITAL ENCOUNTER (OUTPATIENT)
Dept: CARDIAC REHAB | Age: 72
Setting detail: THERAPIES SERIES
Discharge: HOME | End: 2017-09-25
Payer: MEDICARE

## 2017-09-25 ENCOUNTER — APPOINTMENT (OUTPATIENT)
Dept: CARDIAC REHAB | Age: 72
End: 2017-09-25
Payer: MEDICARE

## 2017-09-27 ENCOUNTER — HOSPITAL ENCOUNTER (OUTPATIENT)
Dept: CARDIAC REHAB | Age: 72
Setting detail: THERAPIES SERIES
Discharge: HOME | End: 2017-09-27
Payer: MEDICARE

## 2017-09-27 PROCEDURE — G0422 INTENS CARDIAC REHAB W/EXERC: HCPCS

## 2017-09-27 PROCEDURE — G0423 INTENS CARDIAC REHAB NO EXER: HCPCS

## 2017-09-29 ENCOUNTER — HOSPITAL ENCOUNTER (OUTPATIENT)
Dept: CARDIAC REHAB | Age: 72
Setting detail: THERAPIES SERIES
Discharge: HOME | End: 2017-09-29
Payer: MEDICARE

## 2017-09-29 PROCEDURE — G0423 INTENS CARDIAC REHAB NO EXER: HCPCS

## 2017-09-29 PROCEDURE — G0422 INTENS CARDIAC REHAB W/EXERC: HCPCS

## 2017-10-02 ENCOUNTER — HOSPITAL ENCOUNTER (OUTPATIENT)
Dept: CARDIAC REHAB | Age: 72
Setting detail: THERAPIES SERIES
Discharge: HOME | End: 2017-10-02
Payer: MEDICARE

## 2017-10-02 NOTE — PROGRESS NOTES
Video Education Report - ICR/CR      Name:  Xochitl Ferguson     Date:  10/2/2017  MRN: [id number]     Session #:    Session Length: 45 min    Recommended Videos    Additional Videos   []01 [employer] - Program Overview  []17 Hypertension & Heart Disease  []02 Overview of Pritikin Eating Plan   []18 Cooking Breakfasts and Snacks  []03 Becoming a Pritikin    []19 Planning Your Eating Strategy  []04 Diseases of Our Time - Part 1   []20 Label Reading - Part 2  []05 Calorie Density     []21 Cooking Soups and Desserts  []06 Label Reading - Part 1    []22 How Our Thoughts Can Heal Our Hearts  []07 Move it      []23 Targeting Your Nutrition Priorities  []08 Healthy Minds, Bodies, Hearts   []24 Healthy Salads & Dressings  []09 Dining Out - Part 1    []25 Dining Out - Part 2  []10 Heart Disease Risk Reduction   []26 Cooking Dinner and Sides  []69 Metabolic Syndrome and Belly Fat  []27 Sleep Disorders  []12 Facts on Fat     []28 Menu Workshop  []13 Diseases of Our Time - Part 2   []29 Decoding Lab Results  [x]14 Biology of Weight Control   []30 Vitamins and Minerals  []15 Biomechanical Limitations   []31 Exercise Action Plan  []16 Nutrition Action Plan    []32 Body Composition         []33 Improving Performance         []34 Fueling a Healthy Body         []35 Introduction to Yoga         []36 Aging- Enhancing the Quality of Your Life         []37 Smoking Cessation      Comments:  Video completed          Electronically signed by Gracie Carter on 10/2/2017 at 9:15 AM  Staff Signature/Date/Time

## 2017-10-02 NOTE — PROGRESS NOTES
[facility] Facility-Based Program  Phase 2 Cardiac Rehab Weekly Progress Report      Patient prescribed exercise:  10:00 class. 3 times per week in rehab, 1-4 times per week at home for the amount of sessions/weeks specified by insurance. Current Levels: Treadmill: 3mph/2% for 30 minutes, UBE: Level 0.8 for 5 minutes. Progression Discussion: Maintain/Increase Aerobic exercise 35 minutes to work on endurance. Attempt to increase intensity by 5-20% for each modality this week. Try to increase intensities until Kamron Macias rates the exercises a 13-17 on Frandy RPE.

## 2017-10-04 ENCOUNTER — APPOINTMENT (OUTPATIENT)
Dept: CARDIAC REHAB | Age: 72
End: 2017-10-04
Payer: MEDICARE

## 2017-10-06 ENCOUNTER — HOSPITAL ENCOUNTER (OUTPATIENT)
Dept: CARDIAC REHAB | Age: 72
Setting detail: THERAPIES SERIES
Discharge: HOME | End: 2017-10-06
Payer: MEDICARE

## 2017-10-06 PROCEDURE — G0423 INTENS CARDIAC REHAB NO EXER: HCPCS

## 2017-10-06 PROCEDURE — G0422 INTENS CARDIAC REHAB W/EXERC: HCPCS

## 2017-10-06 NOTE — PROGRESS NOTES
Harriett AGRAWAL.:  1945    Acct Number: [de-identified]   MRN:  [id number]                             [facility] COOKING SCHOOL WORKSHOP               Date: 10/6/2017    Session # ________    Tika Garland class covered:      () Adding Flavor     (x) Fast Breakfasts     () Salads and Dressings     () Soups and Sauces     () Simple Sides     () Appetizers and Snacks     () Delicious Desserts     () Plant Based Proteins     () Fast Evening Meals     () Weekend Breakfasts     () Efficiency Cooking          Patients were shown how to choose, prep, and cook; substitutions and other options were given. Samples were offered. Recipes were given and questions answered. The patient above was in the [employer] for 45 minutes.       Electronically signed by Clara Alvarez on 10/6/2017 at 11:05 AM

## 2017-10-09 ENCOUNTER — HOSPITAL ENCOUNTER (OUTPATIENT)
Dept: CARDIAC REHAB | Age: 72
Setting detail: THERAPIES SERIES
Discharge: HOME | End: 2017-10-09
Payer: MEDICARE

## 2017-10-09 PROCEDURE — G0423 INTENS CARDIAC REHAB NO EXER: HCPCS

## 2017-10-09 PROCEDURE — G0422 INTENS CARDIAC REHAB W/EXERC: HCPCS

## 2017-10-09 NOTE — PLAN OF CARE
with Activity? [] Yes    [] No  Angina Management: ** Angina with Activity?   [] Yes    [] No  Angina Management: **   EXERCISE PLAN EXERCISE PLAN EXERCISE PLAN EXERCISE PLAN EXERCISE PLAN   *Interventions* *Interventions* *Interventions* *Interventions* *Interventions*   Exercise Prescription  (per physician & CR staff) Exercise Prescription  (per physician & CR staff) Exercise Prescription  (per physician & CR staff) Exercise Prescription  (per physician & CR staff) Exercise Prescription  (per physician & CR staff)   Cardiovascular Cardiovascular Cardiovascular Cardiovascular Cardiovascular   Mode:    [x] Treadmill (TM)  [x] Schwinn Airdyne (AD)  [x] Arms Ergometer (AE)  [] NuStep  [] Elliptical (E) MODE:    [x] Treadmill (TM)  [x] Schwinn Airdyne (AD)  [x] Arms Ergometer (AE)  [] NuStep  [] Elliptical (E) MODE:    [x] Treadmill (TM)  [x] Schwinn Airdyne (AD)  [x] Arms Ergometer (AE)  [] NuStep  [] Elliptical (E) MODE:    [] Treadmill (TM)  [] Schwinn Airdyne (AD)  [] Arms Ergometer (AE)  [] NuStep  [] Elliptical (E) MODE:    [] Treadmill (TM)  [] Schwinn Airdyne (AD)  [] Arms Ergometer (AE)  [] NuStep  [] Elliptical (E)   Initial Workloads  TM: [email or web address] 2.9 METs  AD: 1.2 level = 2.7 METs  NS: 68  Canchola= 2.7 METs  AE: 0.6 level = 1.8 METs Current Workloads  TM:  2.8@ 0%= 3.2 METs  AD: 1.2 level = 2.7 METs  AE: 0.6level = 1.8 METs Current Workloads  TM: 3 @ 3%= 4.6 METs  AD: 1.4 level = 3 METs  AE: 0.8 level = 2.2 METs Current Workloads  TM:  @ %=  METs  AD:  level =  METs  NS:   Canchola=  METs  AE:  level =  METs Current Workloads  TM:  @ %=  METs  AD:  level =  METs  NS:   Canchola=  METs  AE:  level =  METs     Frequency:    ICR: 3x/week  Home: 2-3x/wk Frequency:   ICR: 3x/week  Home: 3x/wk Frequency:  ICR: 3x/week  Home: 3-4x/wk Frequency:  ICR: 3x/week  Home: 3-4x/wk Frequency:  Martin Alcocer will continue exercise at  5-7 days/week   Duration:   Total aerobic exercise = 30 min    8 min/mode Duration:  Total aerobic exercises = 34 min     12 min/mode Duration:  Total aerobic exercises = 35 min     15 min/mode Duration:  Total aerobic exercises = ** min     **min/mode Duration:  Total erobic exercise =  60-90 min   Intensity:   MET Level = 2.7-2.9  RPE = 12-15 Intensity:  Max MET Level = 3.2  RPE = 12-15 Intensity:  Max MET Level = 4.6  RPE = 12-15 Intensity:  Max MET Level = **  RPE = 12-15 Intensity:  Max MET Level = ** RPE = 12-15   Progression: increase aerobic activity up to 15 min over next 4 weeks by increasing time 2-3 min/week. Progression:  Increase aerobic acivity up to 15 min/mode over next 4 weeks Progression:  Increase intensity over the next four weeks using RPE and THR. Progression: Progression:  Increase time/intensity when RPE <13, and HR is in [facility]   [x] Yes      [] No  Upper and Lower body strength training 2x/wk    Wt: 2#       Reps:  8-15    *Increase wt. after completing 15 reps with an RPE of <12/13. [x] Yes      [] No  Upper and Lower body strength training 2x/wk    Wt: 3#       Reps:  8-15    *Increase wt. after completing 15 reps with an RPE of <12/13. [x] Yes      [] No  Upper and Lower body strength training 2x/wk    Wt: 5#       Reps:  8-15    *Increase wt. after completing 15 reps with an RPE of <12/13. [] Yes      [] No  Upper and Lower body strength training 2x/wk    Wt: **#       Reps:  **    *Increase wt. after completing 15 reps with an RPE of <12/13. Continue Strength Training at home   [] Exercise Log & Strength training handout given     Wt: **#       Reps:  **    *Increase wt. after completing 15 reps with an RPE of <12/13.    Flexibility Flexibility Flexibility Flexibility Flexibility   [x] Yes      [] No  25 min session of Core Strength & Flexibility 1x/per week  Attends Core Strength & Flexibility   [x] Yes      [] No Attends Core Strength & Flexibility   [x] Yes      [] No Attends Core Strength 3x/wk  [x] continue home exercise 2-3x/wk for 30-45 min   Cristiano's plans to:  Joyasiamohan Virgil achieved exercise goals? Yes    [] No  If no, why?  **  [] Increased 6 min walk distance by 10%  [] Currently exercising 30-60 min/day, 5-7days/wk   [] Plans to continue exercise on own  [] Plans to join a local fitness center to continue exercise  [] Does not plan to continue to exercise after rehab   Return to ADL or Hobbies:  Koffi Bustillo would like to improve strength and endurance so h is able to return to hunting Return to ADL or Hobbies:  Koffi Bustillo would like to improve strength and endurance so he is able to return to hunting. Return to ADL or Hobbies:  Koffi Bustillo would like to improve strength and endurance so he/she is able to return to doing yardwork for longer periods of time.  Return to ADL or Hobbies:  Koffi Bustillo would like to improve strength and endurance so he/she is able to return to ** Return to ADL or Hobbies:  Koffi Bustillo would like to improve strength and endurance so he/she is able to return to **    *MET level required for above goal:  2.5-6.0 METs MET level Achieved:  3.2 METS MET level Achieved:  4.6 METS MET level Achieved:  **METS MET level Achieved:  **METS     Individual Cardiac Treatment Plan - Nutrition  NUTRITION  ASSESSMENT/PLAN NUTRITION  REASSESSMENT NUTRITION   REASSESSMENT NUTRITION   REASSESSMENT NUTRITION  DISCHARGE/FOLLOW-UP   Stages of Change Stages of Change Stages of Change Stages of Change Stages of Change   [] Pre Contemplation  [] Contemplation  [x] Preparation  [] Action  [] Maintenance  [] Relapse [] Pre Contemplation  [] Contemplation  [] Preparation  [x] Action  [] Maintenance  [] Relapse [] Pre Contemplation  [] Contemplation  [] Preparation  [x] Action  [] Maintenance  [] Relapse [] Pre Contemplation  [] Contemplation  [] Preparation  [] Action  [] Maintenance  [] Relapse [] Pre Contemplation  [] Contemplation  [] Preparation  [] Action  [] Maintenance  [] Relapse   NUTRITION ASSESSMENT NUTRITION ASSESSMENT NUTRITION ASSESSMENT NUTRITION ASSESSMENT NUTRITION ASSESSMENT   Weight Management  Weight: 224.4        Height: 6   BMI: 30.5  Weight Management  Weight: 219.6               Weight Management  Weight: 219                  Weight Management  Weight: ** Weight Management  Weight: **                    BMI: **   Eating Plan  Current eating habits: working on it Eating Plan  Changes: Trying to eat more fruits and vegetables. Eating Plan  Changes: Eating more salads Eating Plan  Changes: Eating Plan Improvements:   Alcohol Use  [] none          [] daily  [] weekly      [x] special   Type: beer  Amount: 1+2       Diet Assessment Tool:  RATE YOUR PLATE  *Given to patient to complete and return. Diet Assessment Tool:    Score: 34/69       Diet Assessment Tool: RATE YOUR PLATE  Score: **/37   NUTRITION PLAN NUTRITION PLAN NUTRITION PLAN NUTRITION PLAN NUTRITION PLAN   *Interventions* *Interventions* *Interventions* *Interventions* *Interventions*   Initial Survey given Goal Setting Discussion:   [x] Yes      [] No       Follow Up Survey Reviewed & Goals Updated:     Professional Referral  Please check if needed. [] Dietician Consult   [] Wt. Management Referral  [] Other:  Professional Referral  Please check if needed. [] Dietician Consult   [] Wt. Management Referral  [] Other: Professional Referral  Please check if needed. [] Dietician Consult   [] Wt. Management Referral  [] Other: Professional Referral  Please check if needed. [] Dietician Consult   [] Wt. Management Referral  [] Other: Professional Referral  Please check if needed. [] Dietician Consult   [] Wt.  Management Referral  [] Other:   *Education* *Education* *Education* *Education* *Education*   Nutritional Education Recommended    [x] 1:1 Registered Dietitian    Workshops  [x] Label Reading   [x] Menu  [x] Targeting Nutrition Priorities  [x] Fueling a Healthy Body   Nutritional Education Attended/Date    8/23/17 Label Reading    9/6/17 1:1 with RD Nutritional Education Attended/Date    9/13/17- Menu    9/27/17- 1:1 with RD// Nutritional Education Attended/Date All Sessions Completed? [] Yes  [] No   Cooking School    [x] 11 sessions recommended    Cooking School  Sessions Completed  []Adding Flavor  []Fast & Healthy     Breakfasts  []Salads & Dressings  []Soups & Simple     Sauces  []Simple Sides  []Appetizers &     Snacks  [x]Delicious Desserts 8/06/00  [x]Plant Proteins 9/1/17  []Fast Evening Meals  [x] Weekend Breakfasts 9/8/17  []Cook once, Eat       twice [address]  /Sessions Completed    9/15/17 - Fast Evening Meals    9/22/17 - Kirstie Roes Once Eat Twice    9/29/17 - Adding Flavor without Salt    10/6/17 - Fast and Healthy Breakfast Cooking School  Sessions Completed     Cooking School    # of sessions completed:  **   *Goals* *Goals* *Goals* *Goals* *Goals*   Cristiano's nutritional goals are as follows:  Complete and return diet survey Cristiano's nutritional goals are as follows:  [x] Attend Nutrition Workshops  [x] Attend 1:1   [x] Attend Cooking Classes  [] ** Cristiano's nutritional goals are as follows:  [x] Attend Nutrition Workshops  [x] Attend 1:1   [x] Attend Cooking Classes  [x] Complete and return diet survey  [] ** Cristiano's nutritional goals are as follows:  [] Attend Nutrition Workshops  [] Attend 1:1   [] Attend Cooking Classes  [] ** Cristiano achieved nutritional goals   [] Yes    [] No  If no, why?   Use knowledge gained to continue Pritikin eating plan at home       Individual Cardiac Treatment Plan - Psychosocial  PSYCHOSOCIAL  ASSESSMENT/PLAN PSYCHOSOCIAL  REASSESSMENT PSYCHOSOCIAL   REASSESSMENT PSYCHOSOCIAL   REASSESSMENT PSYCHOSOCIAL  DISCHARGE/FOLLOW-UP   Stages of Change Stages of Change Stages of Change Stages of Change Stages of Change   [] Pre Contemplation  [] Contemplation  [] Preparation  [x] Action  [] Maintenance  [] Relapse [] Pre Contemplation  [] Contemplation  [] Preparation  [] Action  [] Maintenance  [] Relapse [] Pre Contemplation  [] Contemplation  [] Preparation  [x] Action  [] Maintenance  [] Relapse [] Pre Contemplation  [] Contemplation  [] Preparation  [] Action  [] Maintenance  [] Relapse [] Pre Contemplation  [] Contemplation  [] Preparation  [] Action  [] Maintenance  [] Relapse   PSYCHOSOCIAL ASSESSMENT PSYCHOSOCIAL ASSESSMENT PSYCHOSOCIAL ASSESSMENT PSYCHOSOCIAL ASSESSMENT PSYCHOSOCIAL ASSESSMENT   Behavioral Outcomes Behavioral Outcomes Behavioral Outcomes Behavioral Outcomes Behavioral Outcomes   Tool Used:  Deandra & Susan, Quality of Life Index, Cardiac Version IV  *Given to patient to complete. Tool Used:    Deandra & Susan, Quality of Life Index, Cardiac Version IV   QOL Index Score: 16.6    HF:15.6  S&E:18.93  P&S: 14.57  Family: 19.2   Tool Used:     Deandra & Davis, Quality of Life Index, Cardiac Version IV  QOL Index Score: **    HF:**  S&E:**  P&S: **  Family: **   PHQ-9 score 15  Depression Severity  []Minimal  []Mild   [x]Moderate  []Moderately Severe  []Severe    PHQ-9 score **  Depression Severity  []Minimal  []Mild   []Moderate  []Moderately Severe []Severe   Does patient have Family Support? [x] Yes      [] No  No signs of marital/family distress       Using a scale of 0-10, 0=none, 10=very:   Rate your depression: 5  Rate your anxiety:  5  Using a scale of 0-10, 0=none, 10=very:   Rate your depression: 5  Rate your anxiety:  5 Using a scale of 0-10, 0=none, 10=very:   Rate your depression: 5  Rate your anxiety:  5 Using a scale of 0-10, 0=none, 10=very:   Rate your depression: **  Rate your anxiety:  ** Using a scale of 0-10, 0=none, 10=very:   Rate your depression: **  Rate your anxiety:  **   Signs and Symptoms of Depression Present? [] Yes      [x] No   Signs and Symptoms of Depression Present? [] Yes      [x] No   Signs and Symptoms of Depression Present? [] Yes      [x] No   Signs and Symptoms of Depression Present?     [] Yes      [] No  If yes, please explain:  ** Signs and Symptoms of Depression Present? [] Yes      [] No  If yes, please explain:  **   Signs and Symptoms of Anxiety Present? [] Yes      [x] No Signs and Symptoms of Anxiety Present? [] Yes      [x] No   Signs and Symptoms of Anxiety Present? [] Yes      [x] No   Signs and Symptoms of Anxiety Present? [] Yes      [] No  If yes, please explain:  ** Signs and Symptoms of Anxiety Present? [] Yes      [] No  If yes, please explain:  **   [] Patient has poor eye contact   [] Flat affect present. [] Signs of anxiety, anger or hostility    [] Signs social isolation present. []  Signs of alcohol or substance abuse       PSYCHOSOCIAL PLAN PSYCHOSOCIAL PLAN PSYCHOSOCIAL PLAN PSYCHOSOCIAL PLAN PSYCHOSOCIAL PLAN   *Interventions* *Interventions* *Interventions* *Interventions* *Interventions*   *Please check if needed  [] Psych Consult  [] Physician Referral  [x] Stress Management Skills *Please check if needed  [] Psych Consult  [] Physician Referral  [facility]  [x] Stress Management Skills *Please check if needed  [] Psych Consult  [] Physician Referral  [x] Stress Management Skills *Please check if needed  [] Psych Consult  [] Physician Referral  [] Stress Management Skills *Please check if needed  [] Psych Consult  [] Physician Referral  [] Stress Management Skills   Is patient currently taking anti-depressant or anti-anxiety medications? [x] Yes      [] No  If yes, please list medications:  paxil Change in anti-depressant or anti-anxiety medications? [] Yes      [x] No  If yes, please list medications:  na Change in anti-depressant or anti-anxiety medications? [] Yes      [x] No  If yes, please list medications:  ** Change in anti-depressant or anti-anxiety medications? [] Yes      [] No  If yes, please list medications:  ** Change in anti-depressant or anti-anxiety medications?   [] Yes      [] No  If yes, please list medications:  **   *Education* *Education* *Education* *Education* *Education*   Healthy Mind-Set Workshops Recommended  [x] Stress & Health  [x] Taking Charge of Stress  [x] New Thoughts, New Behaviors  [x] Managing Moods & Relationships Healthy Mind-Set Workshops /Attended/Date    8/30/17 New Thoughts, New Behaviors  Healthy Mind-Set Workshops Attended/Date    9/20/17 - Managing Mood Healthy Mind-Set Workshops Attended/Date Healthy Mind-Set Workshops  Completed  [] Yes      [] No   *Goals* *Goals* *Goals* *Goals* *Goals*   Cristiano's psychosocial goals are as follows:  Complete HADS & Deandra & Susan, Quality of Life Index, Cardiac Version IV Cristiano's psychosocial goals are as follows:  [x] Attend Healthy Mind-Set Workshops  [x] Reduce depression symptom severity by 1 level  [] ** Cristiano's psychosocial goals are as follows:  [x] Attend Healthy Mind-Set Workshops  [x] Reduce depression symptom severity by 1 level  [] ** Lennoxs psychosocial goals are as follows:  [] Attend Healthy Mind-Set Workshops  [] Reduce depression symptom severity by 1 level  [] ** Cristiano achieved psychosocial goals?   [] Yes    [] No  If no, why?  **  [] Use methods learned to continue to reduce depression symptom severity by 1 level  [] **     Individual Cardiac Treatment Plan - Other:  Risk Factor/Education  RISK FACTOR  ASSESSMENT/PLAN RISK FACTOR  REASSESSMENT  RISK FACTOR  REASSESSMENT RISK FACTOR  REASSESSMENT RISK FACTOR   DISCHARGE/FOLLOW-UP   Stages of Change Stages of Change Stages of Change Stages of Change Stages of Change   [] Pre Contemplation  [] Contemplation  [] Preparation  [x] Action  [] Maintenance  [] Relapse [] Pre Contemplation  [] Contemplation  [] Preparation  [x] Action  [] Maintenance  [] Relapse [] Pre Contemplation  [] Contemplation  [] Preparation  [x] Action  [] Maintenance  [] Relapse [] Pre Contemplation  [] Contemplation  [] Preparation  [] Action  [] Maintenance  [] Relapse [] Pre Contemplation  [] Contemplation  [] Preparation  [] Action  [] Maintenance  [] Relapse   RISK FACTOR/EDUCATION ASSESSMENT RISK FACTOR/EDUCATION ASSESSMENT RISK FACTOR/EDUCATION ASSESSMENT RISK FACTOR/EDUCATION ASSESSMENT RISK FACTOR /EDUCATION ASSESSMENT   Hypertension  [x] Yes      [] No    Resting BP: 108/56  Peak Ex BP:140/60  Medication: lisinopril, carvedilol   Hypertension  Resting BP: 128/70  Peak Ex BP:154/76  Medication Changes:  [] Yes      [x] No Hypertension  Resting BP: 116/64  Peak Ex BP:142/64  Medication Changes:  [] Yes      [x] No Hypertension  Resting BP: **  Peak Ex BP:**  Medication Changes:  [] Yes      [] No Hypertension  Resting BP: **  Peak Ex BP:**  Medication Changes:  [] Yes      [] No   Lipids  HLD/DLD  [x] Yes      [] No  TOTAL CHOL: 175  HDL:  22  LDL:  89  TRI  Medication: atorvastatin Lipids  Medication Changes:  [] Yes      [x] No     Lipids  Medication Changes:  [] Yes      [x] No     Lipids  Medication Changes:  [] Yes      [] No     Lipids    TOTAL CHOL: **  HDL:  **  LDL:  **  TRIG:  **  Medication Changes:  [] Yes      [] No   Diabetes  [x] Yes      [] No  FBS: 156           HbA1C: 7.2        Monitor BS @ home:   [x] Yes      [] No  Frequency: BID  Medication: lantus, glucophage, actos Diabetes  Most Recent BS: 123  BS have been in range  [x] Yes      [] No  Medication Changes  [] Yes      [x] No   Diabetes  Most Recent BS: 120  BS have been in range  [x] Yes      [] No  Medication Changes  [] Yes      [x] No     Diabetes  Most Recent BS:  BS have been in range  [] Yes      [] No  Medication Changes  [] Yes      [] No     Diabetes  Most Recent BS:  BS have been in range  [] Yes      [] No  Medication Changes  [] Yes      [] No       Tobacco Use  [] Current  [x] Former  [] Never    Years smoked: 13:       Smokeless Tobacco use:   [x] Yes      [] No  Amount: daily Tobacco Use  Change in smoking status   [] Yes      [x] No       Tobacco Use  Change in smoking status   [] Yes      [x] No   Tobacco Use  Change in smoking status   [] Yes medically necessary for continuous cardiac monitoring surveillance  of patient's cardiac activity  [x] Continue continuous telemerty monitoring and notify me with any concerns   [] Other     Physician Response    [x] Cardiac rehab is reasonably and medically necessary for continuous cardiac monitoring surveillance  of patient's cardiac activity  [x] Continue continuous telemerty monitoring and notify me with any concerns   [] Other      Electronically signed by Shane Baxter on 8/10/2017 at 3:47 PM  Rehab Staff Signature Electronically signed by She Veliz on 9/11/2017 at 1:20PM    Rehab Staff Signature Electronically signed by Eyal Nielson on 10/9/2017 at 10:33 AM  Rehab Staff   Signature **  Rehab Staff   Signature **  Rehab Staff Signature     Cosigned by: Martha Lopez DO at 9/11/2017  5:15 PM   Revision History      Date/Time User Provider Type Action   9/11/2017  5:15 PM Martha Lopez DO Physician [address]   9/11/2017  1:51 PM Katja Jones Exercise Physiologist Sign

## 2017-10-11 ENCOUNTER — HOSPITAL ENCOUNTER (OUTPATIENT)
Dept: CARDIAC REHAB | Age: 72
Setting detail: THERAPIES SERIES
Discharge: HOME | End: 2017-10-11
Payer: MEDICARE

## 2017-10-11 PROCEDURE — G0423 INTENS CARDIAC REHAB NO EXER: HCPCS

## 2017-10-11 PROCEDURE — G0422 INTENS CARDIAC REHAB W/EXERC: HCPCS

## 2017-10-11 NOTE — PROGRESS NOTES
Phase 2 Cardiac Rehab  [facility]toward Event  Physician Notification    Patient Name: Gerson Florez    :     1945  Diagnosis  S/P PCI on 17    Physician:    Dr. Manpreet Mistry    10/11/2017    (x)Chest Pain/Discomfort  ()New Arrhythmia/Ectopy  ()New Signs/Symptoms  ()Findings Exceed Acceptable Parameters  ()Other:     Description of event:      Pt stopped TM after 9 min complaining of chest discomfort. Pt stated it felt like burning in the middle of his chest. Pt did not bring nitro with him. Rated chest discomfort 4 / 10, BP - 164/68. Had pt rest in chair. His chest discomfort was relieved after several minutes, rated chest discomfort 0 / 10, BP - 142/64. Pt states he has this sporadically at home. Dr. Manpreet Mistry aware of pt's chest discomfort. Instructed pt to use nitro if this happens at home. Told him if this gets more frequent to contact his cardiologist.    Action taken:    ()Transported to ED  ()Rapid Response Called  (x)Symptoms resolved, continued exercise  ()Called Physician's Office  ()Appointment Scheduled    Please contact Cardiac Rehab with any changes, or to put Cardiac Rehab on hold.          Cardiac Rehab Staff  ()faxed a copy to office, ()called office   [phone]

## 2017-10-11 NOTE — PROGRESS NOTES
Leann AGRAWAL.:  1945    Acct Number: [de-identified]   MRN:  [id number]                             [facility] HEALTHY MIND-SET WORKSHOP               Date: 10/11/2017    Session # __________      Leslie Reyes class covered: ( x)  Stress and Health Workshop:  [facility] patient will learn about the body's adaptive response that is triggered by a variety of stressors. Patient will gain insight into the toll that chronic stress takes on their health, both emotionally and physically. ( ) Taking Charge of Stress Workshop:  [facility] patient will learn and practice a variety of stress management techniques. Patient will be able to effectively apply coping mechanisms in perceived stressful situations. ( ) New Thoughts New Behaviors Workshop: [facility] patient will learn and practice techniques for developing effective health and lifestyle goals. Patient will be able to effectively apply the goal setting process learned to develop at least one new personal goal.     ( ) Managing Moods & Relationships Workshop:  [facility] patient will learn how emotional and chronic stress factors can impact their hearts. They will learn healthy ways to handle stress and utilize positive coping mechanisms. In addition, [facility] patient will learn ways to improve communication skills. Carmelina Randall actively participated and verbalized understanding. Total time in the Healthy Mind-Set class was 60 minutes.       Electronically signed by Vernia Krabbe, LSW on 10/11/2017 at 11:46 AM

## 2017-10-13 ENCOUNTER — HOSPITAL ENCOUNTER (OUTPATIENT)
Dept: CARDIAC REHAB | Age: 72
Setting detail: THERAPIES SERIES
Discharge: HOME | End: 2017-10-13
Payer: MEDICARE

## 2017-10-16 ENCOUNTER — HOSPITAL ENCOUNTER (OUTPATIENT)
Dept: CARDIAC REHAB | Age: 72
Setting detail: THERAPIES SERIES
Discharge: HOME | End: 2017-10-16
Payer: MEDICARE

## 2017-10-16 PROCEDURE — G0422 INTENS CARDIAC REHAB W/EXERC: HCPCS

## 2017-10-16 PROCEDURE — G0423 INTENS CARDIAC REHAB NO EXER: HCPCS

## 2017-10-16 NOTE — PROGRESS NOTES
Video Education Report - ICR/CR      Name:  Fede Guerrero     Date:  10/16/2017  MRN: [id number]     Session #:    Session Length: 35 min    Recommended Videos    Additional Videos   []01 [employer] - Program Overview  []17 Hypertension & Heart Disease  []02 Overview of [employer] Eating Plan   []18 Cooking Breakfasts and Snacks  []03 Becoming a Pritikin    []19 Planning Your Eating Strategy  []04 Diseases of Our Time - Part 1   []20 Label Reading - Part 2  []05 Calorie Density     []21 Cooking Soups and Desserts  []06 Label Reading - Part 1    []22 How Our Thoughts Can Heal Our Hearts  []07 Move it      []23 Targeting Your Nutrition Priorities  [x]08 Healthy Minds, Bodies, Hearts   []24 Healthy Salads & Dressings  []09 Dining Out - Part 1    []25 Dining Out - Part 2  []10 Heart Disease Risk Reduction   []26 Cooking Dinner and Sides  []25 Metabolic Syndrome and Belly Fat  []27 Sleep Disorders  []12 Facts on Fat     []28 Menu Workshop  []13 Diseases of Our Time - Part 2   []29 Decoding Lab Results  []14 Biology of Weight Control   []30 Vitamins and Minerals  []15 Biomechanical Limitations   []31 Exercise Action Plan  []16 Nutrition Action Plan    []32 Body Composition         []33 Improving Performance         []34 Fueling a Healthy Body         []35 Introduction to Yoga         []36 Aging- Enhancing the Quality of Your Life         []37 Smoking Cessation      Comments:  na          Electronically signed by Dory Bettencourt on 10/16/2017 at 9:06 AM  Staff Signature/Date/Time

## 2017-10-16 NOTE — PROGRESS NOTES
[facility] Facility-Based Program  Phase 2 Cardiac Rehab Weekly Progress Report      Patient prescribed exercise:  10:00 class. 3 times per week in rehab, 1-4 times per week at home for the amount of sessions/weeks specified by insurance. Current Levels: Treadmill: 3. 2mph/3% for 15 minutes, Schwinn Airdyne: Level 1.5 for 15 minutes, UBE: Level 0.8 for 5 minutes. Progression Discussion: Maintain/Increase Aerobic exercise 35 minutes to work on endurance. Attempt to increase intensity by 5-20% for each modality this week. Try to increase intensities until Celso Santamaria rates the exercises a 13-17 on Frandy RPE.

## 2017-10-18 ENCOUNTER — HOSPITAL ENCOUNTER (OUTPATIENT)
Dept: CARDIAC REHAB | Age: 72
Setting detail: THERAPIES SERIES
Discharge: HOME | End: 2017-10-18
Payer: MEDICARE

## 2017-10-18 PROCEDURE — G0422 INTENS CARDIAC REHAB W/EXERC: HCPCS

## 2017-10-18 PROCEDURE — G0423 INTENS CARDIAC REHAB NO EXER: HCPCS

## 2017-10-20 ENCOUNTER — HOSPITAL ENCOUNTER (OUTPATIENT)
Dept: CARDIAC REHAB | Age: 72
Setting detail: THERAPIES SERIES
Discharge: HOME | End: 2017-10-20
Payer: MEDICARE

## 2017-10-20 PROCEDURE — G0423 INTENS CARDIAC REHAB NO EXER: HCPCS

## 2017-10-20 PROCEDURE — G0422 INTENS CARDIAC REHAB W/EXERC: HCPCS

## 2017-10-20 NOTE — PROGRESS NOTES
Ivan AGRAWAL.:  1945    Acct Number: [de-identified]   MRN:  [id number]                             [facility] COOKING SCHOOL WORKSHOP               Date: 10/20/2017    Session # ________    Cullen Espinoza class covered:      () Adding Flavor     () Fast Breakfasts     () Salads and Dressings     (x) Soups and Sauces     () Simple Sides     () Appetizers and Snacks     () Delicious Desserts     () Plant Based Proteins     () Fast Evening Meals     () Weekend Breakfasts     () Efficiency Cooking          Patients were shown how to choose, prep, and cook; substitutions and other options were given. Samples were offered. Recipes were given and questions answered. The patient above was in the [employer] for 50 minutes.       Electronically signed by Funmilayo Ortiz on 10/20/2017 at 11:08 AM

## 2017-10-23 ENCOUNTER — HOSPITAL ENCOUNTER (OUTPATIENT)
Dept: CARDIAC REHAB | Age: 72
Setting detail: THERAPIES SERIES
Discharge: HOME | End: 2017-10-23
Payer: MEDICARE

## 2017-10-23 ENCOUNTER — APPOINTMENT (OUTPATIENT)
Dept: CARDIAC REHAB | Age: 72
End: 2017-10-23
Payer: MEDICARE

## 2017-10-23 NOTE — PROGRESS NOTES
Hospital Facility-Based Program  Phase 2 Cardiac Rehab Weekly Progress Report      Patient prescribed exercise:  10:00 class. 3 times per week in rehab, 1-4 times per week at home for the amount of sessions/weeks specified by insurance. Current Levels: Treadmill: 3. 2mph/4% for 15 minutes, Schwinn Airdyne: Level 1.6 for 15 minutes, UBE: Level 1.0 for 5 minutes. Progression Discussion: Maintain Aerobic exercise 15 minutes to work on endurance. Attempt to increase intensity by 5-20% for each modality this week. Try to increase intensities until Koffi Bustillo rates the exercises a 13-17 on Frandy RPE.

## 2017-10-25 ENCOUNTER — APPOINTMENT (OUTPATIENT)
Dept: CARDIAC REHAB | Age: 72
End: 2017-10-25
Payer: MEDICARE

## 2017-10-25 ENCOUNTER — HOSPITAL ENCOUNTER (OUTPATIENT)
Dept: CARDIAC REHAB | Age: 72
Setting detail: THERAPIES SERIES
Discharge: HOME | End: 2017-10-25
Payer: MEDICARE

## 2017-10-27 ENCOUNTER — APPOINTMENT (OUTPATIENT)
Dept: CARDIAC REHAB | Age: 72
End: 2017-10-27
Payer: MEDICARE

## 2017-10-27 ENCOUNTER — HOSPITAL ENCOUNTER (OUTPATIENT)
Dept: CARDIAC REHAB | Age: 72
Setting detail: THERAPIES SERIES
Discharge: HOME | End: 2017-10-27
Payer: MEDICARE

## 2017-10-30 ENCOUNTER — HOSPITAL ENCOUNTER (OUTPATIENT)
Dept: CARDIAC REHAB | Age: 72
Setting detail: THERAPIES SERIES
Discharge: HOME | End: 2017-10-30
Payer: MEDICARE

## 2017-10-30 ENCOUNTER — APPOINTMENT (OUTPATIENT)
Dept: CARDIAC REHAB | Age: 72
End: 2017-10-30
Payer: MEDICARE

## 2017-11-01 ENCOUNTER — HOSPITAL ENCOUNTER (OUTPATIENT)
Dept: CARDIAC REHAB | Age: 72
Setting detail: THERAPIES SERIES
Discharge: HOME | End: 2017-11-01
Payer: MEDICARE

## 2017-11-03 ENCOUNTER — APPOINTMENT (OUTPATIENT)
Dept: CARDIAC REHAB | Age: 72
End: 2017-11-03
Payer: MEDICARE

## 2017-11-03 ENCOUNTER — HOSPITAL ENCOUNTER (OUTPATIENT)
Dept: CARDIAC REHAB | Age: 72
Setting detail: THERAPIES SERIES
Discharge: HOME | End: 2017-11-03
Payer: MEDICARE

## 2017-11-06 ENCOUNTER — HOSPITAL ENCOUNTER (OUTPATIENT)
Dept: CARDIAC REHAB | Age: 72
Setting detail: THERAPIES SERIES
Discharge: HOME | End: 2017-11-06
Payer: MEDICARE

## 2017-11-06 PROCEDURE — G0423 INTENS CARDIAC REHAB NO EXER: HCPCS

## 2017-11-06 PROCEDURE — G0422 INTENS CARDIAC REHAB W/EXERC: HCPCS

## 2017-11-06 NOTE — PROGRESS NOTES
Video Education Report - ICR/CR      Name:  Gerson Florez    Date:  11/6/2017  MRN: [id number]     Session #:    Session Length: 45 min    Recommended Videos    Additional Videos   []01 [employer] - Program Overview  []17 Hypertension & Heart Disease  []02 Overview of [employer] Eating Plan   []18 Cooking Breakfasts and Snacks  []03 Becoming a Pritikin    []19 Planning Your Eating Strategy  []04 Diseases of Our Time - Part 1   []20 Label Reading - Part 2  []05 Calorie Density     []21 Cooking Soups and Desserts  []06 Label Reading - Part 1    []22 How Our Thoughts Can Heal Our Hearts  []07 Move it      []23 Targeting Your Nutrition Priorities  []08 Healthy Minds, Bodies, Hearts   []24 Healthy Salads & Dressings  []09 Dining Out - Part 1    []25 Dining Out - Part 2  []10 Heart Disease Risk Reduction   []26 Cooking Dinner and Sides  [B]97 Metabolic Syndrome and Belly Fat  []27 Sleep Disorders  []12 Facts on Fat     []28 Menu Workshop  []13 Diseases of Our Time - Part 2   []29 Decoding Lab Results  []14 Biology of Weight Control   []30 Vitamins and Minerals  []15 Biomechanical Limitations   []31 Exercise Action Plan  []16 Nutrition Action Plan    []32 Body Composition         []33 Improving Performance         []34 Fueling a Healthy Body         []35 Introduction to Yoga         []36 Aging- Enhancing the Quality of Your Life         []37 Smoking Cessation      Comments:  Video completed      Electronically signed by Ronan Sherman on 11/6/2017 at 9:19 AM  Staff Signature/Date/Time

## 2017-11-08 ENCOUNTER — HOSPITAL ENCOUNTER (OUTPATIENT)
Dept: CARDIAC REHAB | Age: 72
Setting detail: THERAPIES SERIES
Discharge: HOME | End: 2017-11-08
Payer: MEDICARE

## 2017-11-08 PROCEDURE — G0423 INTENS CARDIAC REHAB NO EXER: HCPCS

## 2017-11-08 PROCEDURE — G0422 INTENS CARDIAC REHAB W/EXERC: HCPCS

## 2017-11-08 NOTE — PROGRESS NOTES
Video Education Report - ICR/CR      Name:  Stanford Wilkerson     Date:  11/8/2017  MRN: [id number]     Session #:    Session Length: 37 min    Recommended Videos    Additional Videos   []01 [employer] - Program Overview  []17 Hypertension & Heart Disease  []02 Overview of [employer] Eating Plan   []18 Cooking Breakfasts and Snacks  [x]03 Becoming a Pritikin    []19 Planning Your Eating Strategy  []04 Diseases of Our Time - Part 1   []20 Label Reading - Part 2  []05 Calorie Density     []21 Cooking Soups and Desserts  []06 Label Reading - Part 1    []22 How Our Thoughts Can Heal Our Hearts  []07 Move it      []23 Targeting Your Nutrition Priorities  []08 Healthy Minds, Bodies, Hearts   []24 Healthy Salads & Dressings  []09 Dining Out - Part 1    []25 Dining Out - Part 2  []10 Heart Disease Risk Reduction   []26 Cooking Dinner and Sides  []85 Metabolic Syndrome and Belly Fat  []27 Sleep Disorders  []12 Facts on Fat     []28 Menu Workshop  []13 Diseases of Our Time - Part 2   []29 Decoding Lab Results  []14 Biology of Weight Control   []30 Vitamins and Minerals  []15 Biomechanical Limitations   []31 Exercise Action Plan  []16 Nutrition Action Plan    []32 Body Composition         []33 Improving Performance         []34 Fueling a Healthy Body         []35 Introduction to Yoga         []36 Aging- Enhancing the Quality of Your Life         []37 Smoking Cessation      Comments:  Video completed.       Electronically signed by Sammy Samaniego RD, MARY on 11/8/2017 at 10:29 AM  Staff Signature/Date/Time

## 2017-11-08 NOTE — PLAN OF CARE
[address] [facility] Program - 41 [id number]  [id number] Facility-Based Program  Individualized Cardiac Treatment Plan    Patient Name:  Alba Carrillo  :  1945  Age:  71 y.o. MRN:  [id number]  Diagnosis: PCI to LAD & Circ  Date of Event: 17   Physician:  Fernando Copeland  Next Office Visit:  unsure  Date Entered Program: 8/10/17  Risk Stratifications: [] Low [] Intermediate [x] High  Allergies: Allergies   Allergen Reactions    Bee Venom Swelling       Individual Cardiac Treatment Plan -EXERCISE  INITIAL 27 DAY 61 DAY 90 DAY FINAL DAY   EXERCISE  ASSESSMENT/PLAN EXERCISE  REASSESSMENT EXERCISE   REASSESSMENT EXERCISE   REASSESSMENT EXERCISE  DISCHARGE/FOLLOW-UP   Date: 8/10/2017 Date: 2017 Date: 10/9/17 Date: 17 Date:   Session #1 Session #  Session # 36 Session #52 Session #   Stages of Change Stages of Change Stages of Change Stages of Change  Pt just returned from a hospitalization Stages of Change   [] Pre Contemplation  [] Contemplation  [x] Preparation  [] Action  [] Maintenance  [] Relapse [] Pre Contemplation  [] Contemplation  [] Preparation  [x] Action  [] Maintenance  [] Relapse [] Pre Contemplation  [] Contemplation  [] Preparation  [x] Action  [] Maintenance  [] Relapse [] Pre Contemplation  [] Contemplation  [] Preparation  [x] Action  [] Maintenance  [] Relapse [] Pre Contemplation  [] Contemplation  [] Preparation  [] Action  [] Maintenance  [] Relapse   EXERCISE ASSESSMENT EXERCISE ASSESSMENT EXERCISE ASSESSMENT EXERCISE ASSESSMENT EXERCISE ASSESSMENT   6 Min Walk Test  Distance walked:   0.22 miles  1162 ft.  2.69 METs  Max HR:95 BPM      RPE:  11    THR:  107-121  Rhythm:  NSR w/PVCs    6 Min Walk Test  Distance walked:   ** miles  ** ft  ** METs  Max HR:** BPM      RPE:  **  %Change ft= **    Rhythm:  **   DASI: 6.36 METS DASI: ** METS DASI: 6.4 METS DASI: 6.4 METS DASI: ** METS   Return to Work  [employer] on returning to work?    [] Yes              [] No [] Disabled     [x] Retired     Return to work: Has Anupam Mcallister returned to work? Retired Return to work: Has Anupam Mcallister returned to work? Bree Fontaine is retired. Return to work:  na Return to work: Has Anupam Mcallister returned to work? [] Yes    [] No    Return to work? [] Yes, **    [] No    *Required MET Level achieved for job duties? [] Yes    [] No   Orthopedic Limitations/  [x] Yes    [] No  If yes please list:  2 TKR & back issues     Orthopedic Limitations  *If patient has orthopedic issue:   Actions/  accomodations needed to make Anupam Daubs successful : Monitor any knee or back issues. Take appropriate rest breaks and increase intensity slowly Orthopedic Limitations  Actions/  accomodations needed to make Anupam Daubs successful : Monitor any knee or back issues. Take appropriate rest breaks and increase intensity slowly Orthopedic Limitations na   Orthopedic Limitations     Fall Risk  Fall risk assessed? [x] Yes      [] No    Balance Issues? [] Yes      [x] No     [] Walker [] Cane    [x] Safety issues reviewed      Fall Risk  *If patient is a fall risk, action needed to accommodate: NA  Fall Risk  *If patient is a fall risk, action needed to accommodate: NA  Fall Risk na Fall Risk   Home Exercise  [] Yes    [x] No   Home Exercise  [x] Yes    [] No  Type: walking and weight lifting  Frequency:3-4x/wk  Duration: 15mins Home Exercise  [x] Yes    [] No  Type: Walking  Frequency: 3 days/wk  Duration: 15 minutes Home Exercise  [x] Yes    [] No  Type: walking  Frequency: walking  Duration: 20 min Home Exercise  [] Yes    [] No  Type: **  Frequency: **  Duration: **   Angina with Activity? [] Yes    [x] No  Angina Management:  Angina with Activity? [x] Yes    [] No  Angina Management: Monitor, pt's doctor is aware. Angina with Activity? [x] Yes    [] No  Angina Management: Takes nitro for chest pain when it occurs, goes away after one tablet. Pt's doctor is aware. Angina with Activity?   [x] Yes    [] No  Angina Management: Takes nitro for chest pain when it occurs, goes away after one tablet. Pt's doctor is aware Angina with Activity?   [] Yes    [] No  Angina Management: **   EXERCISE PLAN EXERCISE PLAN EXERCISE PLAN EXERCISE PLAN EXERCISE PLAN   *Interventions* *Interventions* *Interventions* *Interventions* *Interventions*   Exercise Prescription  (per physician & CR staff) Exercise Prescription  (per physician & CR staff) Exercise Prescription  (per physician & CR staff) Exercise Prescription  (per physician & CR staff) Exercise Prescription  (per physician & CR staff)   Cardiovascular Cardiovascular Cardiovascular Cardiovascular Cardiovascular   Mode:    [x] Treadmill (TM)  [x] Schwinn Airdyne (AD)  [x] Arms Ergometer (AE)  [] NuStep  [] Elliptical (E) MODE:    [x] Treadmill (TM)  [x] Schwinn Airdyne (AD)  [x] Arms Ergometer (AE)  [] NuStep  [] Elliptical (E) MODE:    [x] Treadmill (TM)  [x] Schwinn Airdyne (AD)  [x] Arms Ergometer (AE)  [] NuStep  [] Elliptical (E) MODE:    [x] Treadmill (TM)  [x] Schwinn Airdyne (AD)  [x] Arms Ergometer (AE)  [] NuStep  [] Elliptical (E) MODE:    [] Treadmill (TM)  [] Schwinn Airdyne (AD)  [] Arms Ergometer (AE)  [] NuStep  [] Elliptical (E)   Initial Workloads  TM: [email or web address] 2.9 METs  AD: 1.2 level = 2.7 METs  NS: 68  Canchola= 2.7 METs  AE: 0.6 level = 1.8 METs Current Workloads  TM:  2.8@ 0%= 3.2 METs  AD: 1.2 level = 2.7 METs  AE: 0.6level = 1.8 METs Current Workloads  TM: 3 @ 3%= 4.6 METs  AD: 1.4 level = 3 METs  AE: 0.8 level = 2.2 METs Current Workloads  TM: 2.8 @ 2%= 4 METs  AD: 1.6 level = 3.3 METs    AE: 0.7  level = 2 METs Current Workloads  TM:  @ %=  METs  AD:  level =  METs  NS:   Canchola=  METs  AE:  level =  METs     Frequency:    ICR: 3x/week  Home: 2-3x/wk Frequency:   ICR: 3x/week  Home: 3x/wk Frequency:  ICR: 3x/week  Home: 3-4x/wk Frequency:  ICR: 3x/week  Home: 3-4x/wk Frequency:  Efe Arias will continue exercise at  5-7 days/week   Duration:   Total aerobic exercise = 30 min    8 min/mode Duration:  Total aerobic exercises = 34 min     12 min/mode Duration:  Total aerobic exercises = 35 min     15 min/mode Duration:  Total aerobic exercises = 35 min     15 min/mode Duration:  Total erobic exercise =  60-90 min   Intensity:   MET Level = 2.7-2.9  RPE = 12-15 Intensity:  Max MET Level = 3.2  RPE = 12-15 Intensity:  Max MET Level = 4.6  RPE = 12-15 Intensity:  Max MET Level = 4  RPE = 12-15 Intensity:  Max MET Level = ** RPE = 12-15   Progression: increase aerobic activity up to 15 min over next 4 weeks by increasing time 2-3 min/week. Progression:  Increase aerobic acivity up to 15 min/mode over next 4 weeks Progression:  Increase intensity over the next four weeks using RPE and THR. Progression: Increase intensity 5% over the next 4 weeks as tolerated. Progression:  Increase time/intensity when RPE <13, and HR is in [facility]   [x] Yes      [] No  Upper and Lower body strength training 2x/wk    Wt: 2#       Reps:  8-15    *Increase wt. after completing 15 reps with an RPE of <12/13. [x] Yes      [] No  Upper and Lower body strength training 2x/wk    Wt: 3#       Reps:  8-15    *Increase wt. after completing 15 reps with an RPE of <12/13. [x] Yes      [] No  Upper and Lower body strength training 2x/wk    Wt: 5#       Reps:  8-15    *Increase wt. after completing 15 reps with an RPE of <12/13. [x] Yes      [] No  Upper and Lower body strength training 2x/wk    Wt: 3#       Reps:  8-15    *Increase wt. after completing 15 reps with an RPE of <12/13. Continue Strength Training at home   [] Exercise Log & Strength training handout given     Wt: **#       Reps:  **    *Increase wt. after completing 15 reps with an RPE of <12/13.    Flexibility Flexibility Flexibility Flexibility Flexibility   [x] Yes      [] No  25 min session of Core Strength & Flexibility 1x/per week  Attends Core Strength & 3x/wk  [x] continue home exercise 2-3x/wk for 20-30 min  [] ** Cristiano's plans to:  [x] Attend exercise sessions 3x/wk  [x] continue home exercise 2-3x/wk for 30-45 min   Cristiano's plans to: Increase home exercise to 30 min   Cristiano achieved exercise goals? Yes    [] No  If no, why?  **  [] Increased 6 min walk distance by 10%  [] Currently exercising 30-60 min/day, 5-7days/wk   [] Plans to continue exercise on own  [] Plans to join a local fitness center to continue exercise  [] Does not plan to continue to exercise after rehab   Return to ADL or Hobbies:  Efe Arias would like to improve strength and endurance so h is able to return to hunting Return to ADL or Hobbies:  Efe Arias would like to improve strength and endurance so he is able to return to hunting. Return to ADL or Hobbies:  Efe Arias would like to improve strength and endurance so he/she is able to return to doing yardwork for longer periods of time.  Return to ADL or Hobbies:  Efe Arias would like to improve strength and endurance so he is able to return to yardwork Return to ADL or Hobbies:  Efe Arias would like to improve strength and endurance so he/she is able to return to **    *MET level required for above goal:  2.5-6.0 METs MET level Achieved:  3.2 METS MET level Achieved:  4.6 METS MET level Achieved:  4 METS MET level Achieved:  **METS     Individual Cardiac Treatment Plan - Nutrition  NUTRITION  ASSESSMENT/PLAN NUTRITION  REASSESSMENT NUTRITION   REASSESSMENT NUTRITION   REASSESSMENT NUTRITION  DISCHARGE/FOLLOW-UP   Stages of Change Stages of Change Stages of Change Stages of Change Stages of Change   [] Pre Contemplation  [] Contemplation  [x] Preparation  [] Action  [] Maintenance  [] Relapse [] Pre Contemplation  [] Contemplation  [] Preparation  [x] Action  [] Maintenance  [] Relapse [] Pre Contemplation  [] Contemplation  [] Preparation  [x] Action  [] Maintenance  [] Relapse [] Pre Contemplation  [] Contemplation  [x] Preparation  [] Dietitian    Workshops  [x] Label Reading   [x] Menu  [x] Targeting Nutrition Priorities  [x] Fueling a Healthy Body   Nutritional Education Attended/Date    8/23/17 Label Reading    9/6/17 1:1 with RD Nutritional Education Attended/Date    9/13/17- Menu    9/27/17- 1:1 with RD// Nutritional Education Attended/Date All Sessions Completed? [] Yes  [] No   Cooking School    [x] 11 sessions recommended    Cooking School  Sessions Completed  []Adding Flavor  []Fast & Healthy     Breakfasts  []Salads & Dressings  []Soups & Simple     Sauces  []Simple Sides  []Appetizers &     Snacks  [x]Delicious Desserts 6/84/26  [x]Plant Proteins 9/1/17  []Fast Evening Meals  [x] Weekend Breakfasts 9/8/17  []Cook once, Eat       twice [address]  /Sessions Completed    9/15/17 - Fast Evening Meals    9/22/17 - Clarence Karla Once Eat Twice    9/29/17 - Adding Flavor without Salt    10/6/17 - Fast and Healthy Breakfast Cooking School  Sessions Completed     Cooking School    # of sessions completed:  **   *Goals* *Goals* *Goals* *Goals* *Goals*   Cristiano's nutritional goals are as follows:  Complete and return diet survey Cristiano's nutritional goals are as follows:  [x] Attend Nutrition Workshops  [x] Attend 1:1   [x] Attend Cooking Classes  [] ** Cristiano's nutritional goals are as follows:  [x] Attend Nutrition Workshops  [x] Attend 1:1   [x] Attend Cooking Classes  [x] Complete and return diet survey  [] ** Cristiano's nutritional goals are as follows:  [x] Attend Nutrition Workshops  [] Attend 1:1   [x] Attend Cooking Classes  [] ** Alexis George achieved nutritional goals   [] Yes    [] No  If no, why?   Use knowledge gained to continue Pritikin eating plan at home       Individual Cardiac Treatment Plan - Psychosocial  PSYCHOSOCIAL  ASSESSMENT/PLAN PSYCHOSOCIAL  REASSESSMENT PSYCHOSOCIAL   REASSESSMENT PSYCHOSOCIAL   REASSESSMENT PSYCHOSOCIAL  DISCHARGE/FOLLOW-UP   Stages of Change Stages of Change Stages of Change Stages of Change Stages of Present? [] Yes      [x] No   Signs and Symptoms of Depression Present? [] Yes      [x] No   Signs and Symptoms of Depression Present? [x] Yes      [] No  If yes, please explain:  Recent hospitalization Signs and Symptoms of Depression Present? [] Yes      [] No  If yes, please explain:  **   Signs and Symptoms of Anxiety Present? [] Yes      [x] No Signs and Symptoms of Anxiety Present? [] Yes      [x] No   Signs and Symptoms of Anxiety Present? [] Yes      [x] No   Signs and Symptoms of Anxiety Present? [x] Yes      [] No  If yes, please explain:  See above Signs and Symptoms of Anxiety Present? [] Yes      [] No  If yes, please explain:  **   [] Patient has poor eye contact   [] Flat affect present. [] Signs of anxiety, anger or hostility    [] Signs social isolation present. []  Signs of alcohol or substance abuse       PSYCHOSOCIAL PLAN PSYCHOSOCIAL PLAN PSYCHOSOCIAL PLAN PSYCHOSOCIAL PLAN PSYCHOSOCIAL PLAN   *Interventions* *Interventions* *Interventions* *Interventions* *Interventions*   *Please check if needed  [] Psych Consult  [] Physician Referral  [x] Stress Management Skills *Please check if needed  [] Psych Consult  [] Physician Referral  Pt is followed by [place]  [x] Stress Management Skills *Please check if needed  [] Psych Consult  [] Physician Referral  [x] Stress Management Skills *Please check if needed  [] Psych Consult  [] Physician Referral  [x] Stress Management Skills *Please check if needed  [] Psych Consult  [] Physician Referral  [] Stress Management Skills   Is patient currently taking anti-depressant or anti-anxiety medications? [x] Yes      [] No  If yes, please list medications:  paxil Change in anti-depressant or anti-anxiety medications? [] Yes      [x] No  If yes, please list medications:  na Change in anti-depressant or anti-anxiety medications?   [] Yes      [x] No  If yes, please list medications:  ** Change in anti-depressant or anti-anxiety medications? [] Yes      [x] No  If yes, please list medications:  paxil Change in anti-depressant or anti-anxiety medications? [] Yes      [] No  If yes, please list medications:  **   *Education* *Education* *Education* *Education* *Education*   Healthy Mind-Set Workshops Recommended  [x] Stress & Health  [x] Taking Charge of Stress  [x] New Thoughts, New Behaviors  [x] Managing Moods & Relationships Healthy Mind-Set Workshops /Attended/Date    8/30/17 New Thoughts, New Behaviors  Healthy Mind-Set Workshops Attended/Date    9/20/17 - Managing Mood Healthy Mind-Set Workshops Attended/Date  10/11/17Stress & Health Healthy Mind-Set Workshops  Completed  [] Yes      [] No   *Goals* *Goals* *Goals* *Goals* *Goals*   Cristiano's psychosocial goals are as follows:  Complete HADS & Deandra Davis, Quality of Life Index, Cardiac Version IV Cristiano's psychosocial goals are as follows:  [x] Attend Healthy Mind-Set Workshops  [x] Reduce depression symptom severity by 1 level  [] ** Cristiano's psychosocial goals are as follows:  [x] Attend Healthy Mind-Set Workshops  [x] Reduce depression symptom severity by 1 level  [] ** Cristiano's psychosocial goals are as follows:  [x] Attend Healthy Mind-Set Workshops  [x] Reduce depression symptom severity by 1 level  [] ** Cristiano achieved psychosocial goals?   [] Yes    [] No  If no, why?  **  [] Use methods learned to continue to reduce depression symptom severity by 1 level  [] **     Individual Cardiac Treatment Plan - Other:  Risk Factor/Education  RISK FACTOR  ASSESSMENT/PLAN RISK FACTOR  REASSESSMENT  RISK FACTOR  REASSESSMENT RISK FACTOR  REASSESSMENT RISK FACTOR   DISCHARGE/FOLLOW-UP   Stages of Change Stages of Change Stages of Change Stages of Change Stages of Change   [] Pre Contemplation  [] Contemplation  [] Preparation  [x] Action  [] Maintenance  [] Relapse [] Pre Contemplation  [] Contemplation  [] Preparation  [x] Action  [] Maintenance  [] Relapse [] Pre Contemplation  [] Contemplation  [] Preparation  [x] Action  [] Maintenance  [] Relapse [] Pre Contemplation  [] Contemplation  [x] Preparation  [] Action  [] Maintenance  [] Relapse [] Pre Contemplation  [] Contemplation  [] Preparation  [] Action  [] Maintenance  [] Relapse   RISK FACTOR/EDUCATION ASSESSMENT RISK FACTOR/EDUCATION ASSESSMENT RISK FACTOR/EDUCATION ASSESSMENT RISK FACTOR/EDUCATION ASSESSMENT RISK FACTOR /EDUCATION ASSESSMENT   Hypertension  [x] Yes      [] No    Resting BP: 108/56  Peak Ex BP:140/60  Medication: lisinopril, carvedilol   Hypertension  Resting BP: 128/70  Peak Ex BP:154/76  Medication Changes:  [] Yes      [x] No Hypertension  Resting BP: 116/64  Peak Ex BP:142/64  Medication Changes:  [] Yes      [x] No Hypertension  Resting BP: 126/62  Peak Ex BP:122/82  Medication Changes:  [] Yes      [x] No Hypertension  Resting BP: **  Peak Ex BP:**  Medication Changes:  [] Yes      [] No   Lipids  HLD/DLD  [x] Yes      [] No  TOTAL CHOL: 175  HDL:  22  LDL:  89  TRI  Medication: atorvastatin Lipids  Medication Changes:  [] Yes      [x] No     Lipids  Medication Changes:  [] Yes      [x] No     Lipids  Medication Changes:  [] Yes      [x] No     Lipids    TOTAL CHOL: **  HDL:  **  LDL:  **  TRIG:  **  Medication Changes:  [] Yes      [] No   Diabetes  [x] Yes      [] No  FBS: 156           HbA1C: 7.2        Monitor BS @ home:   [x] Yes      [] No  Frequency: BID  Medication: lantus, glucophage, actos Diabetes  Most Recent BS: 123  BS have been in range  [x] Yes      [] No  Medication Changes  [] Yes      [x] No   Diabetes  Most Recent BS: 120  BS have been in range  [x] Yes      [] No  Medication Changes  [] Yes      [x] No     Diabetes  Most Recent BS:  BS have been in range  [] Yes      [x] No  Medication Changes  [] Yes      [x] No     Diabetes  Most Recent BS:  BS have been in range  [] Yes      [] No  Medication Changes  [] Yes      [] No       Tobacco Use  [] Current  [x] Former  [] Never    Years smoked: 13: Smokeless Tobacco use:   [x] Yes      [] No  Amount: daily Tobacco Use  Change in smoking status   [] Yes      [x] No       Tobacco Use  Change in smoking status   [] Yes      [x] No   Tobacco Use  Change in smoking status   [] Yes      [x] No     Tobacco Use  Change in smoking status   [] Yes      [] No    Quit date: **            Learning Barriers  Please select one:  [] Speech  [] Literacy  [] Hearing  [] Cognitive  [] Vision  [x] Ready to Learn Learning Barriers Addressed:   NA   Learning Barriers Addressed:   NA   Learning Barriers Addressed:  [x] Yes      [] No Learning Barriers Addressed:  [] Yes      [] No     RISK FACTOR/EDUCATION PLAN RISK FACTOR/EDUCATION PLAN RISK FACTOR/EDUCATION PLAN RISK FACTOR/EDUCATION PLAN RISK FACTOR/EDUCATION PLAN   *Interventions* *Interventions* *Interventions* *Interventions* *Interventions*   Recommended Educational Videos      [x] Intake & The Pritikin Solution Program Overview  [x] Overview of The Pritikin Eating Plan  [x] Becoming A Pritikin   [x] Diseases of Our Time-Part 1  [x] Calorie Density  [x] Label Reading-Part 1  [x] Move It!   [x] Healthy Minds, Bodies, Hearts  [x] Dining Out-Part 1  [x] Heart Disease Risk Reduction  [x] Metabolic Syndrome & Belly Fat  [x] Facts on Fat  [x] Diseases of Our Times-Part 2  [x] Biology of Weight Control  [x] Biomechanical Limitations  [x] Nurtition Action Plan   Completed Videos    8/10/17 Intake & The Pritikin Solution Program Overview  8/21/17 Move It  8/28/17 Diseases of Our Time, pt 1  9/11/17 Calorie Density   Completed Videos    9/18/17 - Overview of Eating Plan    10/2/17 - Biology of Weight Control Completed Videos  10/9/17 Dining Out-Part 1  10/16/17 Healthy Minds, Bodies, Hearts  10/18/17  Label Reading-Part 1    11/6/17Metabolic Syndrome & Lucas Recommended Educational Videos Completed    [] Yes      [] No    **If not completed, Why? **          Tobacco Cessation/Relaspe Prevention Other   Physician Response    [x] Cardiac rehab is reasonably and medically necessary for continuous cardiac monitoring surveillance  of patient's cardiac activity  [x] Continue continuous telemerty monitoring and notify me with any concerns  [] Other     Physician Response      [x] Cardiac rehab is reasonably and medically necessary for continuous cardiac monitoring surveillance  of patient's cardiac activity  [x] Continue continuous telemerty monitoring and notify me with any concerns   [] Other     Physician Response    [x] Cardiac rehab is reasonably and medically necessary for continuous cardiac monitoring surveillance  of patient's cardiac activity  [x] Continue continuous telemerty monitoring and notify me with any concerns   [] Other      Electronically signed by Sebastián Krishnamurthy on 8/10/2017 at 3:47 PM  Rehab Staff Signature Electronically signed by Priya Poole on 9/11/2017 at 1:20PM    Rehab Staff Signature Electronically signed by Sergio Hope on 10/9/2017 at 10:33 AM  Rehab Staff   Signature Electronically signed by Cordelia Jacobson on 11/8/2017 at 1:22 PM    Rehab Staff   Signature **  Rehab Staff Signature     Cosigned by: Bob Flores DO at 9/11/2017  5:15 PM   Revision History      Date/Time User Provider Type Action   9/11/2017  5:15 PM Bob Flores DO Physician [id number] 10   9/11/2017  1:51 PM Katja Jones Exercise Physiologist Sign

## 2017-11-10 ENCOUNTER — HOSPITAL ENCOUNTER (OUTPATIENT)
Dept: CARDIAC REHAB | Age: 72
Setting detail: THERAPIES SERIES
Discharge: HOME | End: 2017-11-10
Payer: MEDICARE

## 2017-11-10 PROCEDURE — G0423 INTENS CARDIAC REHAB NO EXER: HCPCS

## 2017-11-10 PROCEDURE — G0422 INTENS CARDIAC REHAB W/EXERC: HCPCS

## 2017-11-13 ENCOUNTER — HOSPITAL ENCOUNTER (OUTPATIENT)
Dept: CARDIAC REHAB | Age: 72
Setting detail: THERAPIES SERIES
Discharge: HOME | End: 2017-11-13
Payer: MEDICARE

## 2017-11-13 PROCEDURE — G0422 INTENS CARDIAC REHAB W/EXERC: HCPCS

## 2017-11-13 PROCEDURE — G0423 INTENS CARDIAC REHAB NO EXER: HCPCS

## 2017-11-13 NOTE — PROGRESS NOTES
[facility]  Phase 2 Cardiac Rehab Weekly Progress Report      Patient prescribed exercise:  10:00 class. 3 times per week in rehab, 1-4 times per week at home for the amount of sessions/weeks specified by insurance. Current Levels: Treadmill: 3. 2mph/4% for 15 minutes, Schwinn Airdyne: Level 1.6 for 15 minutes, UBE: Level 0.7 for 5 minutes. Progression Discussion: Maintain Aerobic exercise 15 minutes to work on endurance. Attempt to increase intensity by 5-20% for each modality this week. Try to increase intensities until Celso Santamaria rates the exercises a 13-17 on Frandy RPE.

## 2017-11-13 NOTE — PROGRESS NOTES
Video Education Report - ICR/CR      Name:  Fede Guerrero     Date:  11/13/2017  MRN: [id number]     Session #:    Session Length: 35 min    Recommended Videos    Additional Videos   []01 Pritikin [employer] - Program Overview  []17 Hypertension & Heart Disease  []02 Overview of Pritikin Eating Plan   []18 Cooking Breakfasts and Snacks  []03 Becoming a Pritikin    []19 Planning Your Eating Strategy  []04 Diseases of Our Time - Part 1   []20 Label Reading - Part 2  []05 Calorie Density     []21 Cooking Soups and Desserts  []06 Label Reading - Part 1    []22 How Our Thoughts Can Heal Our Hearts  []07 Move it      []23 Targeting Your Nutrition Priorities  []08 Healthy Minds, Bodies, Hearts   []24 Healthy Salads & Dressings  []09 Dining Out - Part 1    []25 Dining Out - Part 2  []10 Heart Disease Risk Reduction   []26 Cooking Dinner and Sides  []18 Metabolic Syndrome and Belly Fat  []27 Sleep Disorders  []12 Facts on Fat     []28 Menu Workshop  [x]13 Diseases of Our Time - Part 2   []29 Decoding Lab Results  []14 Biology of Weight Control   []30 Vitamins and Minerals  []15 Biomechanical Limitations   []31 Exercise Action Plan  []16 Nutrition Action Plan    []32 Body Composition         []33 Improving Performance         []34 Fueling a Healthy Body         []35 Introduction to Yoga         []36 Aging- Enhancing the Quality of Your Life         []37 Smoking Cessation      Comments:  Video completed      Electronically signed by Dory Bettencourt on 11/13/2017 at 9:10 AM  Staff Signature/Date/Time

## 2017-11-15 ENCOUNTER — HOSPITAL ENCOUNTER (OUTPATIENT)
Dept: CARDIAC REHAB | Age: 72
Setting detail: THERAPIES SERIES
Discharge: HOME | End: 2017-11-15
Payer: MEDICARE

## 2017-11-15 PROCEDURE — G0423 INTENS CARDIAC REHAB NO EXER: HCPCS

## 2017-11-15 PROCEDURE — G0422 INTENS CARDIAC REHAB W/EXERC: HCPCS

## 2017-11-15 NOTE — PROGRESS NOTES
Stanford Wilkerson YOB: 1945    Acct Number: [de-identified]   MRN:  [id number]                             [facility] NUTRITION WORKSHOP             Date: 11/15/2017    Session # __________    Michellenirmal Roberts class covered:    1. () 1:1 Counseling Session  Receptiveness to education/goals:  ( ) Agreeable ( ) No Interest   ( ) Refused  Evaluation of education:  ( ) Indicates understanding   ( ) Needs reinforcement   () Unsuccessful   Readiness to change:    ( ) Pre-contemplative   ( ) Contemplative - ambivalent about change    ( ) Action - ready to set action plan and implement   ( ) Maintenance - has made change and is trying, and or practicing different alternative behaviors   Notes:    2. (X) Label Reading          3. () Menus      4. () Targeting Nutrition Priorities        5. () Fueling a Healthy Body    Renybandar Ferguson was in the Workshop with the Dietitian for 50 minutes. The content was presented via PowerPoint, lecture, and patient participation based format. Patient voiced understanding.      Electronically signed by Sammy Samaniego RD, LD on 11/15/2017 at 9:55 AM

## 2017-11-17 ENCOUNTER — HOSPITAL ENCOUNTER (OUTPATIENT)
Dept: CARDIAC REHAB | Age: 72
Setting detail: THERAPIES SERIES
Discharge: HOME | End: 2017-11-17
Payer: MEDICARE

## 2017-11-17 PROCEDURE — G0422 INTENS CARDIAC REHAB W/EXERC: HCPCS

## 2017-11-17 PROCEDURE — G0423 INTENS CARDIAC REHAB NO EXER: HCPCS

## 2017-11-20 ENCOUNTER — HOSPITAL ENCOUNTER (OUTPATIENT)
Dept: CARDIAC REHAB | Age: 72
Setting detail: THERAPIES SERIES
Discharge: HOME | End: 2017-11-20
Payer: MEDICARE

## 2017-11-20 PROCEDURE — G0423 INTENS CARDIAC REHAB NO EXER: HCPCS

## 2017-11-20 PROCEDURE — G0422 INTENS CARDIAC REHAB W/EXERC: HCPCS

## 2017-11-20 NOTE — PROGRESS NOTES
Video Education Report - ICR/CR      Name:  Art Lau    Date:  11/20/2017  MRN: [id number]     Session #:    Session Length: 45 min    Recommended Videos    Additional Videos   []01 [employer] - Program Overview  []17 Hypertension & Heart Disease  []02 Overview of Pritikin Eating Plan   []18 Cooking Breakfasts and Snacks  []03 Becoming a Pritikin    []19 Planning Your Eating Strategy  []04 Diseases of Our Time - Part 1   []20 Label Reading - Part 2  []05 Calorie Density     []21 Cooking Soups and Desserts  []06 Label Reading - Part 1    []22 How Our Thoughts Can Heal Our Hearts  []07 Move it      []23 Targeting Your Nutrition Priorities  []08 Healthy Minds, Bodies, Hearts   []24 Healthy Salads & Dressings  []09 Dining Out - Part 1    []25 Dining Out - Part 2  []10 Heart Disease Risk Reduction   []26 Cooking Dinner and Sides  []05 Metabolic Syndrome and Belly Fat  []27 Sleep Disorders  [x]12 Facts on Fat     []28 Menu Workshop  []13 Diseases of Our Time - Part 2   []29 Decoding Lab Results  []14 Biology of Weight Control   []30 Vitamins and Minerals  []15 Biomechanical Limitations   []31 Exercise Action Plan  []16 Nutrition Action Plan    []32 Body Composition         []33 Improving Performance         []34 Fueling a Healthy Body         []35 Introduction to Yoga         []36 Aging- Enhancing the Quality of Your Life         []37 Smoking Cessation      Comments:  Video completed      Electronically signed by Baljinder Carias on 11/20/2017 at 9:24 AM  Staff Signature/Date/Time

## 2017-11-22 ENCOUNTER — APPOINTMENT (OUTPATIENT)
Dept: CARDIAC REHAB | Age: 72
End: 2017-11-22
Payer: MEDICARE

## 2017-11-22 ENCOUNTER — HOSPITAL ENCOUNTER (OUTPATIENT)
Dept: CARDIAC REHAB | Age: 72
Setting detail: THERAPIES SERIES
Discharge: HOME | End: 2017-11-22
Payer: MEDICARE

## 2017-11-24 ENCOUNTER — APPOINTMENT (OUTPATIENT)
Dept: CARDIAC REHAB | Age: 72
End: 2017-11-24
Payer: MEDICARE

## 2017-11-27 ENCOUNTER — HOSPITAL ENCOUNTER (OUTPATIENT)
Dept: CARDIAC REHAB | Age: 72
Setting detail: THERAPIES SERIES
Discharge: HOME | End: 2017-11-27
Payer: MEDICARE

## 2017-11-27 PROCEDURE — G0422 INTENS CARDIAC REHAB W/EXERC: HCPCS

## 2017-11-27 PROCEDURE — G0423 INTENS CARDIAC REHAB NO EXER: HCPCS

## 2017-11-29 ENCOUNTER — HOSPITAL ENCOUNTER (OUTPATIENT)
Dept: CARDIAC REHAB | Age: 72
Setting detail: THERAPIES SERIES
Discharge: HOME | End: 2017-11-29
Payer: MEDICARE

## 2017-11-29 PROCEDURE — G0422 INTENS CARDIAC REHAB W/EXERC: HCPCS

## 2017-11-29 PROCEDURE — G0423 INTENS CARDIAC REHAB NO EXER: HCPCS

## 2017-12-01 ENCOUNTER — HOSPITAL ENCOUNTER (OUTPATIENT)
Dept: CARDIAC REHAB | Age: 72
Setting detail: THERAPIES SERIES
Discharge: HOME | End: 2017-12-01
Payer: MEDICARE

## 2017-12-01 PROCEDURE — G0422 INTENS CARDIAC REHAB W/EXERC: HCPCS

## 2017-12-01 PROCEDURE — G0423 INTENS CARDIAC REHAB NO EXER: HCPCS

## 2017-12-01 NOTE — PROGRESS NOTES
Jatinder AGRAWAL.:  1945    Acct Number: [de-identified]   MRN:  [id number]                             [facility] COOKING SCHOOL WORKSHOP             Date: 2017    Session # ________    Patricia Stable class covered:      () Adding Flavor     () Fast Breakfasts     () Salads and Dressings     () Soups and Sauces     () Simple Sides     () Appetizers and Snacks     () Delicious Desserts     () Plant Based Proteins     (x) Fast Evening Meals     () Weekend Breakfasts     () Efficiency Cooking        Patients were shown how to choose, prep, and cook; substitutions and other options were given. Samples were offered. Recipes were given and questions answered. The patient above was in the [employer] for 45 minutes.       Electronically signed by Lazarus Abernethy on 2017 at 11:07 AM

## 2017-12-04 ENCOUNTER — HOSPITAL ENCOUNTER (OUTPATIENT)
Dept: CARDIAC REHAB | Age: 72
Setting detail: THERAPIES SERIES
Discharge: HOME | End: 2017-12-04
Payer: MEDICARE

## 2017-12-04 ENCOUNTER — APPOINTMENT (OUTPATIENT)
Dept: CARDIAC REHAB | Age: 72
End: 2017-12-04
Payer: MEDICARE

## 2017-12-04 NOTE — PROGRESS NOTES
[facility]ospital Facility-Based Program  Phase 2 Cardiac Rehab Weekly Progress Report      Patient prescribed exercise:  9:00 class. 3 times per week in rehab, 1-4 times per week at home for the amount of sessions/weeks specified by insurance. Current Levels: Treadmill: 3. 6mph/1% for 15 minutes, Schwinn Airdyne: Level 1.5 for 15 minutes,UBE: Level 0.8 for 5 minutes. Progression Discussion: Maintain Aerobic exercise 15 minutes to work on endurance. Attempt to increase intensity by 5-20% for each modality this week. Try to increase intensities until Allie Garcia rates the exercises a 13-17 on Frandy RPE.

## 2017-12-06 ENCOUNTER — HOSPITAL ENCOUNTER (OUTPATIENT)
Dept: CARDIAC REHAB | Age: 72
Setting detail: THERAPIES SERIES
Discharge: HOME | End: 2017-12-06
Payer: MEDICARE

## 2017-12-06 PROCEDURE — G0422 INTENS CARDIAC REHAB W/EXERC: HCPCS

## 2017-12-06 PROCEDURE — G0423 INTENS CARDIAC REHAB NO EXER: HCPCS

## 2017-12-06 NOTE — PROGRESS NOTES
Terence AGRAWAL.:  1945    Acct Number: [de-identified]   MRN:  [id number]                             [facility] NUTRITION WORKSHOP             Date: 2017    Session # __________    Lidiabernardino Booker class covered:    1. () 1:1 Counseling Session  Receptiveness to education/goals:  ( ) Agreeable ( ) No Interest   ( ) Refused  Evaluation of education:  ( ) Indicates understanding   ( ) Needs reinforcement   () Unsuccessful   Readiness to change:    ( ) Pre-contemplative   ( ) Contemplative - ambivalent about change    ( ) Action - ready to set action plan and implement   ( ) Maintenance - has made change and is trying, and or practicing different alternative behaviors   Notes:    2. () Label Reading          3. (X) Menus      4. () Targeting Nutrition Priorities        5. () Fueling a Healthy Body    Bea Trejo was in the Workshop with the Dietitian for 50 minutes. The content was presented via PowerPoint, lecture, and patient participation based format. Patient voiced understanding.      Electronically signed by Pennie Gaucher, RD, LD on 2017 at 10:54 AM

## 2017-12-07 NOTE — PLAN OF CARE
[facility] Program - [address] LYHDSEQ  EAXYRCUL Facility-Based Program  Individualized Cardiac Treatment Plan    Patient Name:  Issa Evans  :  1945  Age:  71 y.o. MRN:  [id number]  Diagnosis: PCI to LAD & Circ  Date of Event: 17   Physician:  Len Wright  Next Office Visit:  unsure  Date Entered Program: 8/10/17  Risk Stratifications: [] Low [] Intermediate [x] High  Allergies:    Allergies   Allergen Reactions    Bee Venom Swelling       Individual Cardiac Treatment Plan -EXERCISE  INITIAL 30 DAY 60 DAY 90 DAY FINAL DAY   EXERCISE  ASSESSMENT/PLAN EXERCISE  REASSESSMENT EXERCISE   REASSESSMENT EXERCISE   REASSESSMENT EXERCISE  DISCHARGE/FOLLOW-UP   Date: 8/10/2017 Date: 2017 Date: 10/9/17 Date: 17 Date: 17 / 17   Session #1 Session #  Session # 36 Session #52 Session # 67   Stages of Change Stages of Change Stages of Change Stages of Change  Pt just returned from a hospitalization Stages of Change   [] Pre Contemplation  [] Contemplation  [x] Preparation  [] Action  [] Maintenance  [] Relapse [] Pre Contemplation  [] Contemplation  [] Preparation  [x] Action  [] Maintenance  [] Relapse [] Pre Contemplation  [] Contemplation  [] Preparation  [x] Action  [] Maintenance  [] Relapse [] Pre Contemplation  [] Contemplation  [] Preparation  [x] Action  [] Maintenance  [] Relapse [] Pre Contemplation  [] Contemplation  [] Preparation  [x] Action  [] Maintenance  [] Relapse   EXERCISE ASSESSMENT EXERCISE ASSESSMENT EXERCISE ASSESSMENT EXERCISE ASSESSMENT EXERCISE ASSESSMENT   6 Min Walk Test  Distance walked:   0.22 miles  1162 ft.  2.69 METs  Max HR:95 BPM      RPE:  11    THR:  107-121  Rhythm:  NSR w/PVCs    6 Min Walk Test  Distance walked:   0.35 miles  1848 ft  3.68 METs  Max HR: 114BPM      RPE:  13  %Change ft= 59%    Rhythm:  NSR   DASI: 6.36 METS DASI: ** METS DASI: 6.4 METS DASI: 6.4 METS DASI: 8.27 METS   Return to Work  [employer] on returning to work?   [] Yes              [] No   [] Disabled     [x] Retired     Return to work: Has TEODORO returned to work? Retired Return to work: Has TEODORO returned to work? Rolan Mackenzie is retired. Return to work:  na Return to work: Has TEODORO returned to work?  na   Orthopedic Limitations/  [x] Yes    [] No  If yes please list:  2 TKR & back issues     Orthopedic Limitations  *If patient has orthopedic issue:   Actions/  accomodations needed to make KOTKA successful : Monitor any knee or back issues. Take appropriate rest breaks and increase intensity slowly Orthopedic Limitations  Actions/  accomodations needed to make KOTKA successful : Monitor any knee or back issues. Take appropriate rest breaks and increase intensity slowly Orthopedic Limitations na   Orthopedic Limitations  na     Fall Risk  Fall risk assessed? [x] Yes      [] No    Balance Issues? [] Yes      [x] No     [] Walker [] Cane    [x] Safety issues reviewed      Fall Risk  *If patient is a fall risk, action needed to accommodate: NA  Fall Risk  *If patient is a fall risk, action needed to accommodate: NA  Fall Risk na Fall Risk  na   Home Exercise  [] Yes    [x] No   Home Exercise  [x] Yes    [] No  Type: walking and weight lifting  Frequency:3-4x/wk  Duration: 15mins Home Exercise  [x] Yes    [] No  Type: Walking  Frequency: 3 days/wk  Duration: 15 minutes Home Exercise  [x] Yes    [] No  Type: walking  Frequency: walking  Duration: 20 min Home Exercise  [] Yes    [x] No  Initiate:  Type: walking  Frequency: 5-7x/wk  Duration: 30-60 min   Angina with Activity? [] Yes    [x] No  Angina Management:  Angina with Activity? [x] Yes    [] No  Angina Management: Monitor, pt's doctor is aware. Angina with Activity? [x] Yes    [] No  Angina Management: Takes nitro for chest pain when it occurs, goes away after one tablet. Pt's doctor is aware. Angina with Activity?   [x] Yes    [] No  Angina Management: Takes nitro for chest pain when it occurs, goes away after one tablet. Pt's doctor is aware Angina with Activity? [x] Yes    [] No  Angina Management:Takes nitro for chest pain when it occurs, goes away after one tablet.  Pt's doctor is aware   EXERCISE PLAN EXERCISE PLAN EXERCISE PLAN EXERCISE PLAN EXERCISE PLAN   *Interventions* *Interventions* *Interventions* *Interventions* *Interventions*   Exercise Prescription  (per physician & CR staff) Exercise Prescription  (per physician & CR staff) Exercise Prescription  (per physician & CR staff) Exercise Prescription  (per physician & CR staff) Exercise Prescription  (per physician & CR staff)   Cardiovascular Cardiovascular Cardiovascular Cardiovascular Cardiovascular   Mode:    [x] Treadmill (TM)  [x] Schwinn Airdyne (AD)  [x] Arms Ergometer (AE)  [] NuStep  [] Elliptical (E) MODE:    [x] Treadmill (TM)  [x] Schwinn Airdyne (AD)  [x] Arms Ergometer (AE)  [] NuStep  [] Elliptical (E) MODE:    [x] Treadmill (TM)  [x] Schwinn Airdyne (AD)  [x] Arms Ergometer (AE)  [] NuStep  [] Elliptical (E) MODE:    [x] Treadmill (TM)  [x] Schwinn Airdyne (AD)  [x] Arms Ergometer (AE)  [] NuStep  [] Elliptical (E) MODE:    [x] Treadmill (TM)  [x] Schwinn Airdyne (AD)  [x] Arms Ergometer (AE)  [] NuStep  [] Elliptical (E)   Initial Workloads  TM: [email or web address] 2.9 METs  AD: 1.2 level = 2.7 METs  NS: 68  Canchola= 2.7 METs  AE: 0.6 level = 1.8 METs Current Workloads  TM:  2.8@ 0%= 3.2 METs  AD: 1.2 level = 2.7 METs  AE: 0.6level = 1.8 METs Current Workloads  TM: 3 @ 3%= 4.6 METs  AD: 1.4 level = 3 METs  AE: 0.8 level = 2.2 METs Current Workloads  TM: 2.8 @ 2%= 4 METs  AD: 1.6 level = 3.3 METs    AE: 0.7  level = 2 METs Current Workloads  TM:  3.6 @1 %= 4.3 METs  AD: 1.5 level = 3.2 METs  AE: 0.8 level = 2.2 METs     Frequency:    ICR: 3x/week  Home: 2-3x/wk Frequency:   ICR: 3x/week  Home: 3x/wk Frequency:  ICR: 3x/week  Home: 3-4x/wk Frequency:  ICR: 3x/week  Home: 3-4x/wk Frequency:  Bea Trejo will continue exercise at  5-7 days/week Duration:   Total aerobic exercise = 30 min    8 min/mode Duration:  Total aerobic exercises = 34 min     12 min/mode Duration:  Total aerobic exercises = 35 min     15 min/mode Duration:  Total aerobic exercises = 35 min     15 min/mode Duration:  Total erobic exercise =  60-90 min   Intensity:   MET Level = 2.7-2.9  RPE = 12-15 Intensity:  Max MET Level = 3.2  RPE = 12-15 Intensity:  Max MET Level = 4.6  RPE = 12-15 Intensity:  Max MET Level = 4  RPE = 12-15 Intensity:  Max MET Level = 4.3   RPE = 12-15   Progression: increase aerobic activity up to 15 min over next 4 weeks by increasing time 2-3 min/week. Progression:  Increase aerobic acivity up to 15 min/mode over next 4 weeks Progression:  Increase intensity over the next four weeks using RPE and THR. Progression: Increase intensity 5% over the next 4 weeks as tolerated. Progression:  Increase time/intensity when RPE <13, and HR is in [facility]   [x] Yes      [] No  Upper and Lower body strength training 2x/wk    Wt: 2#       Reps:  8-15    *Increase wt. after completing 15 reps with an RPE of <12/13. [x] Yes      [] No  Upper and Lower body strength training 2x/wk    Wt: 3#       Reps:  8-15    *Increase wt. after completing 15 reps with an RPE of <12/13. [x] Yes      [] No  Upper and Lower body strength training 2x/wk    Wt: 5#       Reps:  8-15    *Increase wt. after completing 15 reps with an RPE of <12/13. [x] Yes      [] No  Upper and Lower body strength training 2x/wk    Wt: 3#       Reps:  8-15    *Increase wt. after completing 15 reps with an RPE of <12/13. Continue Strength Training at home   [x] Exercise Log & Strength training handout given     Wt: 3-4#       Reps:  8-15    *Increase wt. after completing 15 reps with an RPE of <12/13.    Flexibility Flexibility Flexibility Flexibility Flexibility   [x] Yes      [] No  25 min session of Core Strength & Contemplation  [] Contemplation  [] Preparation  [x] Action  [] Maintenance  [] Relapse [] Pre Contemplation  [] Contemplation  [x] Preparation  [] Action  [] Maintenance  [] Relapse [] Pre Contemplation  [] Contemplation  [] Preparation  [x] Action  [] Maintenance  [] Relapse   NUTRITION ASSESSMENT NUTRITION ASSESSMENT NUTRITION ASSESSMENT NUTRITION ASSESSMENT NUTRITION ASSESSMENT   Weight Management  Weight: 224.4        Height: 6   BMI: 30.5  Weight Management  Weight: 219.6               Weight Management  Weight: 219                  Weight Management  Weight: 217.4 Weight Management  Weight: 215                   BMI: 29.2   Eating Plan  Current eating habits: working on it Eating Plan  Changes: Trying to eat more fruits and vegetables. Eating Plan  Changes: Eating more salads Eating Plan  Changes: \"trying to eat right\" Eating Plan Improvements:  Pritikin diet   Alcohol Use  [] none          [] daily  [] weekly      [x] special   Type: beer  Amount: 1+2       Diet Assessment Tool:  RATE YOUR PLATE  *Given to patient to complete and return. Diet Assessment Tool:    Score: 34/69       Diet Assessment Tool: RATE YOUR PLATE  Score: na   UTRITION PLAN NUTRITION PLAN NUTRITION PLAN NUTRITION PLAN NUTRITION PLAN   *Interventions* *Interventions* *Interventions* *Interventions* *Interventions*   Initial Survey given Goal Setting Discussion:   [x] Yes      [] No       Follow Up Survey Reviewed & Goals Updated:     Professional Referral  Please check if needed. [] Dietician Consult   [] Wt. Management Referral  [] Other:  Professional Referral  Please check if needed. [] Dietician Consult   [] Wt. Management Referral  [] Other: Professional Referral  Please check if needed. [] Dietician Consult   [] Wt. Management Referral  [] Other: Professional Referral  Please check if needed. [] Dietician Consult   [] Wt. Management Referral  [] Other: Professional Referral  Please check if needed.   [] Dietician Consult   [] Wt. Management Referral  [] Other:   *Education* *Education* *Education* *Education* *Education*   Nutritional Education Recommended    [x] 1:1 Registered Dietitian    Workshops  [x] Label Reading   [x] Menu  [x] Targeting Nutrition Priorities  [x] Fueling a Healthy Body   Nutritional Education Attended/Date    8/23/17 Label Reading    9/6/17 1:1 with RD Nutritional Education Attended/Date    9/13/17- Menu    9/27/17- 1:1 with RD// Nutritional Education Attended/Date    [x] Label Reading -11/15/17   [x] Menu -12/5/17 All Sessions Completed? [] Yes  [x] No   Cooking School    [x] 11 sessions recommended    Cooking School  Sessions Completed  []Adding Flavor  []Fast & Healthy     Breakfasts  []Salads & Dressings  []Soups & Simple     Sauces  []Simple Sides  []Appetizers &     Snacks  [x]Delicious Desserts 0/48/06  [x]Plant Proteins 9/1/17  []Fast Evening Meals   [x] Weekend Breakfasts 9/8/17  []Cook once, Eat       twice [address]  /Sessions Completed    9/15/17 - Fast Evening Meals    9/22/17 - Marisela Lu Once Eat Twice    9/29/17 - Adding Flavor without Salt    10/6/17 - Fast and Healthy Breakfast Cooking School  Sessions Completed    [x]Delicious Desserts 33/35/32  [x]Plant Proteins 11/18/17  [x]Fast Evening Meals-12/1/17  [x] Weekend Breakfasts 12/8/17 Cooking School    # of sessions completed:  11   *Goals* *Goals* *Goals* *Goals* *Goals*   Cristiano's nutritional goals are as follows:  Complete and return diet survey Cristiano's nutritional goals are as follows:  [x] Attend Nutrition Workshops  [x] Attend 1:1   [x] Attend Cooking Classes  [] ** Cristiano's nutritional goals are as follows:  [x] Attend Nutrition Workshops  [x] Attend 1:1   [x] Attend Cooking Classes  [x] Complete and return diet survey  [] ** Cristiano's nutritional goals are as follows:  [x] Attend Nutrition Workshops  [] Attend 1:1   [x] Attend Cooking Classes  [] ** Jones Perez achieved nutritional goals   [x] Yes    [] No  If no, why?   Use knowledge gained 0=none, 10=very:   Rate your depression: 5  Rate your anxiety:  5 Using a scale of 0-10, 0=none, 10=very:   Rate your depression: 5  Rate your anxiety:  5 Using a scale of 0-10, 0=none, 10=very:   Rate your depression: 5  Rate your anxiety: 5   Signs and Symptoms of Depression Present? [] Yes      [x] No   Signs and Symptoms of Depression Present? [] Yes      [x] No   Signs and Symptoms of Depression Present? [] Yes      [x] No   Signs and Symptoms of Depression Present? [x] Yes      [] No  If yes, please explain:  Recent hospitalization Signs and Symptoms of Depression Present? [x] Yes      [] No  If yes, please explain:  Recent hospitalization   Signs and Symptoms of Anxiety Present? [] Yes      [x] No Signs and Symptoms of Anxiety Present? [] Yes      [x] No   Signs and Symptoms of Anxiety Present? [] Yes      [x] No   Signs and Symptoms of Anxiety Present? [x] Yes      [] No  If yes, please explain:  See above Signs and Symptoms of Anxiety Present? [x] Yes      [] No  If yes, please explain: See above   [] Patient has poor eye contact   [] Flat affect present. [] Signs of anxiety, anger or hostility    [] Signs social isolation present.    []  Signs of alcohol or substance abuse       PSYCHOSOCIAL PLAN PSYCHOSOCIAL PLAN PSYCHOSOCIAL PLAN PSYCHOSOCIAL PLAN PSYCHOSOCIAL PLAN   *Interventions* *Interventions* *Interventions* *Interventions* *Interventions*   *Please check if needed  [] Psych Consult  [] Physician Referral  [x] Stress Management Skills *Please check if needed  [] Psych Consult  [] Physician Referral  Pt is followed by [address]  [x] Stress Management Skills *Please check if needed  [] Psych Consult  [] Physician Referral  [x] Stress Management Skills *Please check if needed  [] Psych Consult  [] Physician Referral  [x] Stress Management Skills *Please check if needed  [] Psych Consult  [] Physician Referral  [x] Stress Management Skills   Is patient currently taking 1  9/11/17 Calorie Density   Completed Videos    9/18/17 - Overview of Eating Plan    10/2/17 - Biology of Weight Control Completed Videos  10/9/17 Dining Out-Part 1  10/16/17 Healthy Minds, Bodies, Hearts  10/18/17  Label Reading-Part 1    11/6/17Metabolic Syndrome & Lucas Recommended Educational Videos Completed  [x] Yes      [] No    [x] Becoming A Pritikin -11/8/17    Heart Disease Risk Reduction-10/2/17    [x] Facts on Fat-11/20/17    [x] Diseases of Our Times-Part 2-  11/13/17    [x] Nurtition Action Plan-11/29/17            Tobacco Cessation/Relaspe Prevention Intervention needed? [] Yes      [x] No  *Pt verbalizes and agrees to attend intervention Tobacco Cessation/Relapse Prevention Scheduled? NA Tobacco Cessation/Relapse Prevention completed? NA    Tobacco Cessation Counseling attended    na   Professional Referrals:  *Please check if needed  [] Diabetes Clinic  [] Lipid Clinic   [] Other:     Professional Referrals:  *Please check if needed  [] Diabetes Clinic  [] Lipid Clinic   [] Other:   Preventative Medication Preventative Medication Preventative Medication Preventative Medication Preventative Medication   Aspirin  [x] Yes    [] No  Blood Thinner: Clopidogrel/Effient/Brillinta  [x] Yes    [] No  Beta Blocker  [x] Yes    [] No  Ace Inhibitor  [x] Yes    [] No  Statin/Lipid Lowering  [x] Yes    [] No Medication Changes? [] Yes    [x] No Medication Changes? [] Yes    [x] No Medication Changes? [] Yes    [x] No Medication Changes? [] Yes    [x] No   *Education* *Education* *Education* *Education* *Education*   Does Welford Felling require any additional education? [] Yes    [x] No   Does Welford Felling require any additional education? [] Yes    [x] No Does Welford Felling require any additional education? [] Yes    [x] No Does Welford Felling require any additional education? [] Yes    [x] No Does Welford Felling require any additional education?   [] Yes    [x] No   Recommended Additional Educational Videos    Medical  [] Hypertension & Heart Disease  [] Decoding Lab Results  [] Aging Enhancing Your QoL  [] Sleep Disorders  Exercise  [] Body Composition  [] Improve Performance  [] Exercise Action Plan  [] Intro to Yoga  Behavioral  [] How Our Thoughts Can Heal Our Hearts  [] Smoking Cessation  Nutrition  [] Planning Your Eating Strategy  [] Lable Reading- Part 2  [] Dining Out - Part 2  [] Targeting Your Nutrition Priorities  [] Fueling a Healthy Body  [] Menu Workshop  [employer] [] Breakfast & Snacks  [] [employer] Salads & Dressing  [] [address] [place] [address]  [] Soups & Desserts   Additional Educational Videos Completed Additional Educational Videos Completed Additional Educational Videos Completed Additional Educational Videos Completed    [x] Yes    [] No   *Goals* *Goals* *Goals* *Goals* *Goals*   Cristiano's risk factor/education goals are as follows:    [x] Optimal BP <140/90  [x] Blood Sugar <120  [x] Attend recommended video education sessions  [x] Takes medications as prescribed 100% of the time   [] ** Cristiano's risk factor/education goals are as follows:    [x] Optimal BP <140/90  [] Blood Sugar <120  [x] Attend recommended video education sessions  [x] Takes medications as prescribed 100% of the time   [] ** Cristiano's risk factor/education goals are as follows:    [x] Optimal BP <140/90  [x] Blood Sugar <120  [x] Attend recommended video education sessions  [x] Takes medications as prescribed 100% of the time   [] ** Cristiano's risk factor/education goals are as follows:    [x] Optimal BP <140/90  [x] Blood Sugar <120  [x] Attend recommended video education sessions  [x] Takes medications as prescribed 100% of the time   [] Addison Short achieved risk factor goals?   [x] Yes    [] No       Monitored telemetry has revealed NSR w/PVCs  [] documented arrhythmia at increasing workloads  [] associated symptoms  Monitored telemetry has revealed NSR, bigeminy  [] documented arrhythmia at increasing workloads  [] associated Rachid Bright Exercise Physiologist Sign       Cosigned by: Marielle Jennings DO at 9/11/2017  5:15 PM   Revision History      Date/Time User Provider Type Action   9/11/2017  5:15 PM Marielle Jennings DO Physician Cosign   9/11/2017  1:51 PM Katja Jones Exercise Physiologist Sign     Cosigned by: Marielle Jennings DO at 10/9/2017  1:38 PM   Revision History      Date/Time User Provider Type Action   10/9/2017  1:38 PM Marielle Jennings DO [facility]ician [id number]   10/9/2017 10:39 AM Ludin Macias Exercise Physiologist Sign       Cosigned by: Marielle Jennings DO at 11/8/2017  3:18 PM   Revision History      Date/Time User Provider Type Action   11/8/2017  3:18 PM Marielle Jennings DO Physician Cosign   11/8/2017  1:28 PM Yu Mansfield Exercise Physiologist Addend   11/8/2017  1:24 PM Yu Stacyerjovita Exercise Physiologist Addend   11/8/2017  1:24 PM Yu Stacyerholalbina Exercise Physiologist Sign   View Details Report

## 2017-12-08 ENCOUNTER — HOSPITAL ENCOUNTER (OUTPATIENT)
Dept: CARDIAC REHAB | Age: 72
Setting detail: THERAPIES SERIES
Discharge: HOME | End: 2017-12-08
Payer: MEDICARE

## 2017-12-08 PROCEDURE — G0423 INTENS CARDIAC REHAB NO EXER: HCPCS

## 2017-12-08 PROCEDURE — G0422 INTENS CARDIAC REHAB W/EXERC: HCPCS

## 2018-05-18 ENCOUNTER — HOSPITAL ENCOUNTER (OUTPATIENT)
Dept: GENERAL RADIOLOGY | Age: 73
Discharge: HOME | End: 2018-05-18
Payer: MEDICARE

## 2018-05-18 ENCOUNTER — HOSPITAL ENCOUNTER (OUTPATIENT)
Dept: INTERVENTIONAL RADIOLOGY/VASCULAR | Age: 73
Discharge: HOME | End: 2018-05-18
Payer: MEDICARE

## 2018-05-18 ENCOUNTER — HOSPITAL ENCOUNTER (OUTPATIENT)
Age: 73
Discharge: HOME | End: 2018-05-18
Payer: MEDICARE

## 2018-05-18 ENCOUNTER — HOSPITAL ENCOUNTER (OUTPATIENT)
Dept: PULMONOLOGY | Age: 73
Discharge: HOME | End: 2018-05-18
Payer: MEDICARE

## 2018-05-18 DIAGNOSIS — Z79.899 ENCOUNTER FOR LONG-TERM CURRENT USE OF MEDICATION: ICD-10-CM

## 2018-05-18 DIAGNOSIS — I73.9 PVD (PERIPHERAL VASCULAR DISEASE) (HCC): ICD-10-CM

## 2018-05-18 PROCEDURE — 71046 X-RAY EXAM CHEST 2 VIEWS: CPT

## 2018-05-18 PROCEDURE — 93922 UPR/L XTREMITY ART 2 LEVELS: CPT

## 2018-05-18 PROCEDURE — 94729 DIFFUSING CAPACITY: CPT

## 2018-05-18 PROCEDURE — 94060 EVALUATION OF WHEEZING: CPT

## 2018-05-18 PROCEDURE — 94726 PLETHYSMOGRAPHY LUNG VOLUMES: CPT

## 2018-08-24 ENCOUNTER — ANESTHESIA EVENT (OUTPATIENT)
Dept: OPERATING ROOM | Age: 73
End: 2018-08-24
Payer: MEDICARE

## 2018-08-24 ENCOUNTER — ANESTHESIA (OUTPATIENT)
Dept: OPERATING ROOM | Age: 73
End: 2018-08-24
Payer: MEDICARE

## 2018-08-24 ENCOUNTER — HOSPITAL ENCOUNTER (OUTPATIENT)
Age: 73
Setting detail: OUTPATIENT SURGERY
Discharge: HOME | End: 2018-08-24
Attending: ORTHOPAEDIC SURGERY | Admitting: ORTHOPAEDIC SURGERY
Payer: MEDICARE

## 2018-08-24 VITALS
SYSTOLIC BLOOD PRESSURE: 160 MMHG | BODY MASS INDEX: 29.69 KG/M2 | HEART RATE: 49 BPM | RESPIRATION RATE: 18 BRPM | WEIGHT: 207.4 LBS | HEIGHT: 70 IN | OXYGEN SATURATION: 97 % | TEMPERATURE: 97.1 F | DIASTOLIC BLOOD PRESSURE: 74 MMHG

## 2018-08-24 VITALS
RESPIRATION RATE: 6 BRPM | SYSTOLIC BLOOD PRESSURE: 102 MMHG | TEMPERATURE: 98.6 F | OXYGEN SATURATION: 100 % | DIASTOLIC BLOOD PRESSURE: 63 MMHG

## 2018-08-24 LAB
GLUCOSE BLD-MCNC: 147 MG/DL (ref 70–108)
GLUCOSE BLD-MCNC: 177 MG/DL (ref 70–108)
POTASSIUM SERPL-SCNC: 4.4 MEQ/L (ref 3.5–5.2)

## 2018-08-24 PROCEDURE — 6360000002 HC RX W HCPCS: Performed by: ANESTHESIOLOGY

## 2018-08-24 PROCEDURE — 7100000010 HC PHASE II RECOVERY - FIRST 15 MIN: Performed by: ORTHOPAEDIC SURGERY

## 2018-08-24 PROCEDURE — 6360000002 HC RX W HCPCS: Performed by: NURSE ANESTHETIST, CERTIFIED REGISTERED

## 2018-08-24 PROCEDURE — A4565 SLINGS: HCPCS | Performed by: ORTHOPAEDIC SURGERY

## 2018-08-24 PROCEDURE — 3600000015 HC SURGERY LEVEL 5 ADDTL 15MIN: Performed by: ORTHOPAEDIC SURGERY

## 2018-08-24 PROCEDURE — 7100000011 HC PHASE II RECOVERY - ADDTL 15 MIN: Performed by: ORTHOPAEDIC SURGERY

## 2018-08-24 PROCEDURE — 2709999900 HC NON-CHARGEABLE SUPPLY: Performed by: ORTHOPAEDIC SURGERY

## 2018-08-24 PROCEDURE — 2580000003 HC RX 258: Performed by: ORTHOPAEDIC SURGERY

## 2018-08-24 PROCEDURE — 7100000001 HC PACU RECOVERY - ADDTL 15 MIN: Performed by: ORTHOPAEDIC SURGERY

## 2018-08-24 PROCEDURE — 3700000000 HC ANESTHESIA ATTENDED CARE: Performed by: ORTHOPAEDIC SURGERY

## 2018-08-24 PROCEDURE — 36415 COLL VENOUS BLD VENIPUNCTURE: CPT

## 2018-08-24 PROCEDURE — 82948 REAGENT STRIP/BLOOD GLUCOSE: CPT

## 2018-08-24 PROCEDURE — 6370000000 HC RX 637 (ALT 250 FOR IP): Performed by: ORTHOPAEDIC SURGERY

## 2018-08-24 PROCEDURE — C1713 ANCHOR/SCREW BN/BN,TIS/BN: HCPCS | Performed by: ORTHOPAEDIC SURGERY

## 2018-08-24 PROCEDURE — 7100000000 HC PACU RECOVERY - FIRST 15 MIN: Performed by: ORTHOPAEDIC SURGERY

## 2018-08-24 PROCEDURE — 3700000001 HC ADD 15 MINUTES (ANESTHESIA): Performed by: ORTHOPAEDIC SURGERY

## 2018-08-24 PROCEDURE — 6360000002 HC RX W HCPCS: Performed by: ORTHOPAEDIC SURGERY

## 2018-08-24 PROCEDURE — 2500000003 HC RX 250 WO HCPCS: Performed by: NURSE ANESTHETIST, CERTIFIED REGISTERED

## 2018-08-24 PROCEDURE — 3600000005 HC SURGERY LEVEL 5 BASE: Performed by: ORTHOPAEDIC SURGERY

## 2018-08-24 PROCEDURE — 84132 ASSAY OF SERUM POTASSIUM: CPT

## 2018-08-24 PROCEDURE — 64415 NJX AA&/STRD BRCH PLXS IMG: CPT | Performed by: ANESTHESIOLOGY

## 2018-08-24 DEVICE — 5.5MM TITANIUM INTRALINE ANCHOR WITH 2 STRANDS #2 FORCE FIBER
Type: IMPLANTABLE DEVICE | Site: SHOULDER | Status: FUNCTIONAL
Brand: TITANIUM INTRALINE

## 2018-08-24 DEVICE — 5.5MM STRYKER REELX STT ANCHOR
Type: IMPLANTABLE DEVICE | Site: SHOULDER | Status: FUNCTIONAL
Brand: REELX

## 2018-08-24 RX ORDER — ACETAMINOPHEN 325 MG/1
650 TABLET ORAL EVERY 4 HOURS PRN
Status: DISCONTINUED | OUTPATIENT
Start: 2018-08-24 | End: 2018-08-24 | Stop reason: HOSPADM

## 2018-08-24 RX ORDER — PROPOFOL 10 MG/ML
INJECTION, EMULSION INTRAVENOUS PRN
Status: DISCONTINUED | OUTPATIENT
Start: 2018-08-24 | End: 2018-08-24 | Stop reason: SDUPTHER

## 2018-08-24 RX ORDER — HYDROCODONE BITARTRATE AND ACETAMINOPHEN 5; 325 MG/1; MG/1
1 TABLET ORAL EVERY 4 HOURS PRN
Status: DISCONTINUED | OUTPATIENT
Start: 2018-08-24 | End: 2018-08-24 | Stop reason: HOSPADM

## 2018-08-24 RX ORDER — ONDANSETRON 2 MG/ML
4 INJECTION INTRAMUSCULAR; INTRAVENOUS EVERY 6 HOURS PRN
Status: DISCONTINUED | OUTPATIENT
Start: 2018-08-24 | End: 2018-08-24 | Stop reason: HOSPADM

## 2018-08-24 RX ORDER — MEPERIDINE HYDROCHLORIDE 25 MG/ML
12.5 INJECTION INTRAMUSCULAR; INTRAVENOUS; SUBCUTANEOUS EVERY 5 MIN PRN
Status: DISCONTINUED | OUTPATIENT
Start: 2018-08-24 | End: 2018-08-24 | Stop reason: HOSPADM

## 2018-08-24 RX ORDER — DOCUSATE SODIUM 100 MG/1
100 CAPSULE, LIQUID FILLED ORAL 2 TIMES DAILY
Status: DISCONTINUED | OUTPATIENT
Start: 2018-08-24 | End: 2018-08-24 | Stop reason: HOSPADM

## 2018-08-24 RX ORDER — EPHEDRINE SULFATE 50 MG/ML
INJECTION INTRAVENOUS PRN
Status: DISCONTINUED | OUTPATIENT
Start: 2018-08-24 | End: 2018-08-24 | Stop reason: SDUPTHER

## 2018-08-24 RX ORDER — HYDROCODONE BITARTRATE AND ACETAMINOPHEN 5; 325 MG/1; MG/1
2 TABLET ORAL EVERY 4 HOURS PRN
Status: DISCONTINUED | OUTPATIENT
Start: 2018-08-24 | End: 2018-08-24 | Stop reason: HOSPADM

## 2018-08-24 RX ORDER — MORPHINE SULFATE 2 MG/ML
2 INJECTION, SOLUTION INTRAMUSCULAR; INTRAVENOUS EVERY 5 MIN PRN
Status: COMPLETED | OUTPATIENT
Start: 2018-08-24 | End: 2018-08-24

## 2018-08-24 RX ORDER — SODIUM CHLORIDE 9 MG/ML
INJECTION, SOLUTION INTRAVENOUS CONTINUOUS
Status: DISCONTINUED | OUTPATIENT
Start: 2018-08-24 | End: 2018-08-24

## 2018-08-24 RX ORDER — SUCCINYLCHOLINE CHLORIDE 20 MG/ML
INJECTION INTRAMUSCULAR; INTRAVENOUS PRN
Status: DISCONTINUED | OUTPATIENT
Start: 2018-08-24 | End: 2018-08-24 | Stop reason: SDUPTHER

## 2018-08-24 RX ORDER — DIPHENHYDRAMINE HYDROCHLORIDE 50 MG/ML
12.5 INJECTION INTRAMUSCULAR; INTRAVENOUS
Status: DISCONTINUED | OUTPATIENT
Start: 2018-08-24 | End: 2018-08-24 | Stop reason: HOSPADM

## 2018-08-24 RX ORDER — ONDANSETRON 2 MG/ML
4 INJECTION INTRAMUSCULAR; INTRAVENOUS
Status: DISCONTINUED | OUTPATIENT
Start: 2018-08-24 | End: 2018-08-24 | Stop reason: HOSPADM

## 2018-08-24 RX ORDER — MIDAZOLAM HYDROCHLORIDE 1 MG/ML
INJECTION INTRAMUSCULAR; INTRAVENOUS PRN
Status: DISCONTINUED | OUTPATIENT
Start: 2018-08-24 | End: 2018-08-24 | Stop reason: SDUPTHER

## 2018-08-24 RX ORDER — FENTANYL CITRATE 50 UG/ML
INJECTION, SOLUTION INTRAMUSCULAR; INTRAVENOUS PRN
Status: DISCONTINUED | OUTPATIENT
Start: 2018-08-24 | End: 2018-08-24 | Stop reason: SDUPTHER

## 2018-08-24 RX ORDER — LABETALOL HYDROCHLORIDE 5 MG/ML
5 INJECTION, SOLUTION INTRAVENOUS EVERY 5 MIN PRN
Status: DISCONTINUED | OUTPATIENT
Start: 2018-08-24 | End: 2018-08-24 | Stop reason: HOSPADM

## 2018-08-24 RX ORDER — LIDOCAINE HYDROCHLORIDE 20 MG/ML
INJECTION, SOLUTION INFILTRATION; PERINEURAL PRN
Status: DISCONTINUED | OUTPATIENT
Start: 2018-08-24 | End: 2018-08-24 | Stop reason: SDUPTHER

## 2018-08-24 RX ORDER — FENTANYL CITRATE 50 UG/ML
50 INJECTION, SOLUTION INTRAMUSCULAR; INTRAVENOUS EVERY 5 MIN PRN
Status: DISCONTINUED | OUTPATIENT
Start: 2018-08-24 | End: 2018-08-24 | Stop reason: HOSPADM

## 2018-08-24 RX ORDER — HYDRALAZINE HYDROCHLORIDE 20 MG/ML
5 INJECTION INTRAMUSCULAR; INTRAVENOUS EVERY 10 MIN PRN
Status: DISCONTINUED | OUTPATIENT
Start: 2018-08-24 | End: 2018-08-24 | Stop reason: HOSPADM

## 2018-08-24 RX ADMIN — MIDAZOLAM HYDROCHLORIDE 2 MG: 1 INJECTION, SOLUTION INTRAMUSCULAR; INTRAVENOUS at 07:44

## 2018-08-24 RX ADMIN — PROPOFOL 150 MG: 10 INJECTION, EMULSION INTRAVENOUS at 07:56

## 2018-08-24 RX ADMIN — SODIUM CHLORIDE: 9 INJECTION, SOLUTION INTRAVENOUS at 08:35

## 2018-08-24 RX ADMIN — FENTANYL CITRATE 50 MCG: 50 INJECTION INTRAMUSCULAR; INTRAVENOUS at 07:56

## 2018-08-24 RX ADMIN — MORPHINE SULFATE 2 MG: 2 INJECTION, SOLUTION INTRAMUSCULAR; INTRAVENOUS at 10:37

## 2018-08-24 RX ADMIN — EPHEDRINE SULFATE 10 MG: 50 INJECTION, SOLUTION INTRAVENOUS at 08:21

## 2018-08-24 RX ADMIN — LIDOCAINE HYDROCHLORIDE 30 MG: 20 INJECTION, SOLUTION INFILTRATION; PERINEURAL at 07:56

## 2018-08-24 RX ADMIN — CEFAZOLIN 1 G: 1 INJECTION, POWDER, FOR SOLUTION INTRAMUSCULAR; INTRAVENOUS; PARENTERAL at 08:04

## 2018-08-24 RX ADMIN — Medication 140 MG: at 07:56

## 2018-08-24 RX ADMIN — MORPHINE SULFATE 2 MG: 2 INJECTION, SOLUTION INTRAMUSCULAR; INTRAVENOUS at 10:27

## 2018-08-24 RX ADMIN — MORPHINE SULFATE 2 MG: 2 INJECTION, SOLUTION INTRAMUSCULAR; INTRAVENOUS at 10:54

## 2018-08-24 RX ADMIN — MORPHINE SULFATE 2 MG: 2 INJECTION, SOLUTION INTRAMUSCULAR; INTRAVENOUS at 10:32

## 2018-08-24 RX ADMIN — HYDROCODONE BITARTRATE AND ACETAMINOPHEN 1 TABLET: 5; 325 TABLET ORAL at 12:05

## 2018-08-24 RX ADMIN — SODIUM CHLORIDE: 9 INJECTION, SOLUTION INTRAVENOUS at 07:44

## 2018-08-24 RX ADMIN — FENTANYL CITRATE 50 MCG: 50 INJECTION INTRAMUSCULAR; INTRAVENOUS at 07:44

## 2018-08-24 ASSESSMENT — PULMONARY FUNCTION TESTS
PIF_VALUE: 10
PIF_VALUE: 22
PIF_VALUE: 22
PIF_VALUE: 23
PIF_VALUE: 23
PIF_VALUE: 14
PIF_VALUE: 22
PIF_VALUE: 23
PIF_VALUE: 23
PIF_VALUE: 41
PIF_VALUE: 15
PIF_VALUE: 23
PIF_VALUE: 35
PIF_VALUE: 22
PIF_VALUE: 23
PIF_VALUE: 21
PIF_VALUE: 23
PIF_VALUE: 23
PIF_VALUE: 22
PIF_VALUE: 1
PIF_VALUE: 23
PIF_VALUE: 23
PIF_VALUE: 22
PIF_VALUE: 22
PIF_VALUE: 23
PIF_VALUE: 22
PIF_VALUE: 22
PIF_VALUE: 17
PIF_VALUE: 20
PIF_VALUE: 23
PIF_VALUE: 22
PIF_VALUE: 23
PIF_VALUE: 14
PIF_VALUE: 23
PIF_VALUE: 0
PIF_VALUE: 23
PIF_VALUE: 23
PIF_VALUE: 22
PIF_VALUE: 23
PIF_VALUE: 22
PIF_VALUE: 23
PIF_VALUE: 23
PIF_VALUE: 27
PIF_VALUE: 23
PIF_VALUE: 26
PIF_VALUE: 0
PIF_VALUE: 22
PIF_VALUE: 0
PIF_VALUE: 22
PIF_VALUE: 23
PIF_VALUE: 22
PIF_VALUE: 0
PIF_VALUE: 23
PIF_VALUE: 22
PIF_VALUE: 23
PIF_VALUE: 23
PIF_VALUE: 20
PIF_VALUE: 22
PIF_VALUE: 23
PIF_VALUE: 23
PIF_VALUE: 0
PIF_VALUE: 14
PIF_VALUE: 22
PIF_VALUE: 17
PIF_VALUE: 23
PIF_VALUE: 23
PIF_VALUE: 22
PIF_VALUE: 1
PIF_VALUE: 22
PIF_VALUE: 23
PIF_VALUE: 22
PIF_VALUE: 22
PIF_VALUE: 1
PIF_VALUE: 23
PIF_VALUE: 19
PIF_VALUE: 22
PIF_VALUE: 0
PIF_VALUE: 23
PIF_VALUE: 22
PIF_VALUE: 23
PIF_VALUE: 17
PIF_VALUE: 15
PIF_VALUE: 0
PIF_VALUE: 22
PIF_VALUE: 22
PIF_VALUE: 19
PIF_VALUE: 22
PIF_VALUE: 23
PIF_VALUE: 21
PIF_VALUE: 22
PIF_VALUE: 1
PIF_VALUE: 23
PIF_VALUE: 22
PIF_VALUE: 15
PIF_VALUE: 22
PIF_VALUE: 14
PIF_VALUE: 26
PIF_VALUE: 22
PIF_VALUE: 23
PIF_VALUE: 1
PIF_VALUE: 23
PIF_VALUE: 18
PIF_VALUE: 23
PIF_VALUE: 23
PIF_VALUE: 15
PIF_VALUE: 22
PIF_VALUE: 23
PIF_VALUE: 23
PIF_VALUE: 17
PIF_VALUE: 23
PIF_VALUE: 23

## 2018-08-24 ASSESSMENT — PAIN SCALES - GENERAL
PAINLEVEL_OUTOF10: 4
PAINLEVEL_OUTOF10: 5
PAINLEVEL_OUTOF10: 4
PAINLEVEL_OUTOF10: 4
PAINLEVEL_OUTOF10: 2
PAINLEVEL_OUTOF10: 4
PAINLEVEL_OUTOF10: 2
PAINLEVEL_OUTOF10: 4
PAINLEVEL_OUTOF10: 2

## 2018-08-24 ASSESSMENT — PAIN DESCRIPTION - DESCRIPTORS: DESCRIPTORS: ACHING

## 2018-08-24 ASSESSMENT — PAIN DESCRIPTION - PROGRESSION
CLINICAL_PROGRESSION: NOT CHANGED

## 2018-08-24 ASSESSMENT — PAIN DESCRIPTION - ONSET
ONSET: ON-GOING

## 2018-08-24 ASSESSMENT — PAIN - FUNCTIONAL ASSESSMENT: PAIN_FUNCTIONAL_ASSESSMENT: 0-10

## 2018-08-24 ASSESSMENT — PAIN DESCRIPTION - LOCATION: LOCATION: SHOULDER

## 2018-08-24 ASSESSMENT — PAIN DESCRIPTION - PAIN TYPE: TYPE: SURGICAL PAIN

## 2018-08-24 ASSESSMENT — PAIN DESCRIPTION - ORIENTATION: ORIENTATION: RIGHT

## 2018-08-24 ASSESSMENT — PAIN SCALES - WONG BAKER: WONGBAKER_NUMERICALRESPONSE: 0

## 2018-08-24 NOTE — BRIEF OP NOTE
Brief Postoperative Note  ______________________________________________________________    Patient: Ghanshyam Newton  YOB: 1945  MRN: [id number]  Date of Procedure: 8/24/2018    Pre-Op Diagnosis: RIGHT ROTATOR CUFF TEAR    Post-Op Diagnosis: Same       Procedure(s):  RIGHT SHOULDER OPERATIVE ARTHROSCOPY, ROTATOR CUFF REPAIR, SUBACROMIAL DECOMPRESSION, DISTAL CLAVICLE EXCISION, BICEP TENODESIS    Anesthesia: Anesthesia type not filed in the log. Surgeon(s):  Radha Cevallos MD    Staff:  Scrub Person First: Kishore Hinton  Scrub Person Second: Son Cortes     Estimated Blood Loss: * No values recorded between 8/24/2018  7:44 AM and 8/24/2018  9:56 AM * None    Complications: None    Specimens:   * No specimens in log *    Implants:    Implant Name Type Inv.  Item Serial No.  Lot No. LRB No. Used   ANCHOR 5.5    BROWN: ENDOSCOPY [id number] Right 2   IMPL ANCHOR REELX STT 5.5MM Fastener IMPL ANCHOR REELX STT 5.5MM   STRYKERRaul Gen Right 2         Drains:      Findings: rct    Radha Cevallos MD  Date: 8/24/2018  Time: 9:56 AM

## 2018-08-24 NOTE — ANESTHESIA PROCEDURE NOTES
Peripheral Block    Patient location during procedure: pre-op  Start time: 8/24/2018 7:31 AM  End time: 8/24/2018 7:40 AM  Staffing  Anesthesiologist: Michele Phan  Performed: anesthesiologist   Preanesthetic Checklist  Completed: patient identified, site marked, surgical consent, pre-op evaluation, timeout performed, IV checked, risks and benefits discussed, monitors and equipment checked, anesthesia consent given, oxygen available and patient being monitored  Peripheral Block  Patient position: supine  Prep: ChloraPrep  Patient monitoring: cardiac monitor, continuous pulse ox, frequent blood pressure checks and IV access  Block type: Brachial plexus  Laterality: right  Injection technique: single-shot  Procedures: ultrasound guided and nerve stimulator  Local infiltration: ropivacaine  Infiltration strength: 0.5 %  Dose: 20 mL  Interscalene  Provider prep: mask and sterile gloves  Local infiltration: ropivacaine  Needle  Needle type: combined needle/nerve stimulator   Needle gauge: 21 G  Needle length: 5 cm  Needle localization: ultrasound guidance, anatomical landmarks and nerve stimulator  Needle insertion depth: 1.2 cm  Assessment  Injection assessment: negative aspiration for heme, no paresthesia on injection and local visualized surrounding nerve on ultrasound  Paresthesia pain: none  Slow fractionated injection: yes  Hemodynamics: stable  Additional Notes  Immediately prior to procedure a \"time out\" was called to verify the correct patient, allergies, laterality, procedure and equipment. Time out performed with Mary INMAN    Local Anesthetic: 0.5 %  Ropivacaine   Amount: 20 ml  in 5 ml increments after negative aspiration each time.         Reason for block: post-op pain management and at surgeon's request

## 2018-08-24 NOTE — ANESTHESIA PRE PROCEDURE
Department of Anesthesiology  Preprocedure Note       Name:  Ghanshyam Newton   Age:  68 y.o.  :  1945                                          MRN:  [id number]         Date:  2018      Surgeon: Brijesh Bingham):  Radha Cevallos MD    Procedure: Procedure(s):  RIGHT SHOULDER OPERATIVE ARTHROSCOPY, ROTATOR CUFF REPAIR, SUBACROMIAL DECOMPRESSION, DISTAL CLAVICLE EXCISION, BICEP TENODESIS    Medications prior to admission:   Prior to Admission medications    Medication Sig Start Date End Date Taking?  Authorizing Provider   amiodarone (CORDARONE) 200 MG tablet Take 1 tablet by mouth 2 times daily  Patient taking differently: Take 200 mg by mouth daily  17  Yes Vanessa Katz MD   bumetanide (BUMEX) 2 MG tablet Take 1 tablet by mouth daily  Patient taking differently: Take 1 mg by mouth daily  17  Yes Vanessa Katz MD   potassium chloride (KLOR-CON M) 20 MEQ extended release tablet Take 1 tablet by mouth daily 17  Yes Vanessa Katz MD   atorvastatin (LIPITOR) 80 MG tablet Take 40 mg by mouth nightly   Yes Historical Provider, MD   budesonide-formoterol (SYMBICORT) 160-4.5 MCG/ACT AERO Inhale 2 puffs into the lungs 2 times daily   Yes Historical Provider, MD   docusate sodium (COLACE) 100 MG capsule Take 100 mg by mouth daily   Yes Historical Provider, MD   gabapentin (NEURONTIN) 600 MG tablet Take 600 mg by mouth 3 times daily   Yes Historical Provider, MD   insulin glargine (LANTUS SOLOSTAR) 100 UNIT/ML injection pen Inject 8 Units into the skin nightly   Yes Historical Provider, MD   lisinopril (PRINIVIL;ZESTRIL) 5 MG tablet Take 2.5 mg by mouth daily    Yes Historical Provider, MD   ondansetron (ZOFRAN) 4 MG tablet Take 4 mg by mouth every 6 hours as needed for Nausea or Vomiting   Yes Historical Provider, MD   naproxen (NAPROSYN) 500 MG tablet Take 500 mg by mouth 2 times daily   Yes Historical Provider, MD   carvedilol (COREG) 25 MG tablet Take 12.5 mg by mouth 2 times daily   Yes Yeison Mccain, APRN - CRNA   140 mg at 08/24/18 0756    ePHEDrine injection    PRN Luke Bhatt, APRN - CRNA   10 mg at 08/24/18 2871       Allergies:     Allergies   Allergen Reactions    Bee Venom Swelling       Problem List:    Patient Active Problem List   Diagnosis Code    Spinal stenosis, lumbar region, with neurogenic claudication N83.582    Metabolic acidosis N88.8    Leukocytosis D72.829    Normocytic anemia D64.9    DM (diabetes mellitus), type 2, uncontrolled ([facility] [address] E11.65    HTN (hypertension) I10    MALIKA (obstructive sleep apnea) G47.33    HLD (hyperlipidemia) E78.5    Asthma J45.909    Depression F32.9    Constipation K59.00    H/O class III angina pectoris Z86.79    Ventricular arrhythmia I49.9    Paroxysmal atrial fibrillation (HCC) I48.0    Obesity due to excess calories E66.09       Past Medical History:        Diagnosis Date    Cancer ([facility] [address]     skin basal cell    Diabetes mellitus ([facility] [address]     Hypertension     Prolonged emergence from general anesthesia     had apnea with surgery    Sleep apnea     has CPAP    Wheeze        Past Surgical History:        Procedure Laterality Date    BACK SURGERY      back fusion 2015    CARDIAC SURGERY      4 stents; 2016    DENTAL SURGERY      KNEE ARTHROPLASTY Bilateral     OTHER SURGICAL HISTORY      wounded [place] not sure of what all they did had surgery to leg, had delayed wound closure    SKIN CANCER EXCISION      TONSILLECTOMY         Social History:    Social History   Substance Use Topics    Smoking status: Former Smoker     Quit date: 1/1/1973    Smokeless tobacco: Current User    Alcohol use Yes      Comment: 1-2 beer per night                                Ready to quit: Not Answered  Counseling given: Not Answered      Vital Signs (Current):   Vitals:    08/24/18 0552 08/24/18 0700   BP: (!) 97/59    Pulse: 62    Resp: 18    Temp: 97.2 °F (36.2 °C)    TempSrc: Temporal    SpO2: 96%    Weight:  207 lb 6.4 oz (94.1 kg)   Height: (>4 METS),   (+) hypertension:,                   Neuro/Psych:   (+) psychiatric history:            GI/Hepatic/Renal:   (+) morbid obesity          Endo/Other:    (+) DiabetesType II DM, poorly controlled, , .          Pt had no PAT visit       Abdominal:           Vascular: negative vascular ROS. Anesthesia Plan      general     ASA 3     (Pt consents to right interscalene nerve block after all risks vs benefits discussed at length with the patient, his wife, and his two sons. Pt verbalizes his understanding of this and consents to GETA with peripheral nerve block for postoperative pain management.)  Induction: intravenous. MIPS: Prophylactic antiemetics administered. Anesthetic plan and risks discussed with patient, spouse and child/children. Plan discussed with CRNA.                   Lindy Fisher DO   8/24/2018

## 2018-08-24 NOTE — ANESTHESIA POSTPROCEDURE EVALUATION
Department of Anesthesiology  Postprocedure Note    Patient: Triny Sanchez  MRN: [id number]  YOB: 1945  Date of evaluation: 8/24/2018  Time:  11:20 AM     Procedure Summary     Date:  08/24/18 Room / Location:  [facility]    Anesthesia Start:  4487 Anesthesia Stop:  1001    Procedure:  RIGHT SHOULDER OPERATIVE ARTHROSCOPY, ROTATOR CUFF REPAIR, SUBACROMIAL DECOMPRESSION, DISTAL CLAVICLE EXCISION, BICEP TENODESIS (Right ) Diagnosis:  (RIGHT ROTATOR CUFF TEAR)    Surgeon:  Renella Dancer, MD Responsible Provider:  David Mcelroy DO    Anesthesia Type:  general ASA Status:  3          Anesthesia Type: No value filed. Celi Phase I: Celi Score: 4    Celi Phase II:      Last vitals: Reviewed and per EMR flowsheets.        Anesthesia Post Evaluation    Patient location during evaluation: PACU  Patient participation: complete - patient participated  Level of consciousness: awake  Airway patency: patent  Nausea & Vomiting: no vomiting and no nausea  Complications: no  Cardiovascular status: hemodynamically stable  Respiratory status: acceptable  Hydration status: stable

## 2018-08-24 NOTE — PROGRESS NOTES
Discharge criteria was met. Patient stated understanding discharge instructions. prescriptions given to patient. Left via Wheelchair.  Assessment of Surgical site no drainage from right shoulder

## 2018-08-25 ENCOUNTER — NURSE TRIAGE (OUTPATIENT)
Dept: ADMINISTRATIVE | Age: 73
End: 2018-08-25

## 2018-08-25 NOTE — OP NOTE
significant pain  and problems with activities of daily living. For these reasons, he  elected for operative intervention. DESCRIPTION OF PROCEDURE:  Side and site were confirmed in the preoperative  holding area. He was then taken to the operating room where side and site  of the procedure confirmed via time-out. General anesthetic was  administered. The nerve block was done. He was then placed in lateral  decubitus position. Axillary roll was placed beneath the left axilla and  left _____ anesthesia. The right shoulder was then prepped and draped in  standard surgical fashion. We did arthroscopy through standard posterior  portal, direct lateral portal and anteromedial portal.  Following findings  were found. The glenohumeral joint had no significant degenerative  changes. The biceps had about 50% tear and had significant tendonitis  throughout it. A #2 Force Fiber Suture was wrapped around at one end, both  ends were placed through it, one end through the leading edge of the  subscapularis and then it was tied down and clipped from the superior  glenoid attachment. We debrided the labrum. He had extensive synovitis  throughout the shoulder. Extensive capsular release was done on the  superior half of the capsule. An anterior rotator interval release was  done. He had a  large rotator cuff tear with retraction. We then went to  subacromial space. Extensive bursectomy was performed. The CA ligament  was released. He had evidence of some chronicity with significant  impingement findings and abrasions underneath the acromion. Turned this  into type 1 flat acromion from a coming from a posterior comeback approach. We debrided the tuberosity and rotator cuff footprint. This was done  through arthroscopic shaver down to bleeding bone. We placed a tension  suture in the cuff and we were able to pull it out at least to the  articular margin if not little further.   By holding tension on this, we  used the arthroscopic shaver as well as electrocautery and did extensive  releasing of the soft tissue. This brought it over probably about half way  down. We then placed an anchor just posterior to the bicipital groove, another  anchor in the posterior third. Placed the sutures from anterior to  posterior, we tied them posterior to anterior. This brought the cuff out  laterally and brought it up anteriorly. I would say we had a 100%  footprint repair and covering the posterior half and probably a 75%  footprint repair on the anterior half, 1 mm of each knot that was tied was  placed through a ReelX anchor, one in the anterior third and one in the  posterior third of the tuberosity. Those were malleted down appropriately  and tensioned down. The suture ends were clipped. I was very happy and  pleased with our repair at this point. The [facility] joint was very stenotic and  very arthritic. I removed a few millimeters of acromion and then we  removed about 1 cm of distal clavicle giving us about a 1.2-cm space. Irrigated and lavaged, drained the shoulder. We reviewed the  intraarticular cuff _____ intraarticular margin, watertight repair. Irrigated, lavaged, and drained the shoulder again. Closed the portals  with 3-0 nylon. Sterile dressing, wrap, and sling were applied. He was  taken to the recovery room in stable condition. No complication. I was  present for the entire procedure. PLAN:  Physical therapy will begin postoperative day #3. We will see him  back in my office in two weeks' time with AP and lateral x-ray before being  seen.         Karla Smith M.D.    D: 08/24/2018 10:17:29       T: 08/24/2018 12:20:40     JANINE_SAL  Job#: [id number]     Doc#: [id number]    CC:

## 2018-08-25 NOTE — TELEPHONE ENCOUNTER
Message from Joelle Sims sent at 8/25/2018 10:35 AM EDT     Summary: ice for pain    Had rotator cuff surgery yesterday, had questions regarding ice for relief of pain. Call History      Type Contact Phone User   08/25/2018 10:33 AM Phone (Incoming) Sommer Moise [address]   pt son Aster Rivas     Asking if ice can be used more often than 30 minute q 2-3 hours on his father's R shoulder. Discussed that he could try 20 minutes of every hour till he gets the pain under control. Not responding well to the Norco- not seeming to meet his need-discussed that if he cannot get the pain under control. Can call the surgeon on call and see if there is something else that can be tried. States that they used an injection that was to last 20 hours and wore off well before that.

## 2019-07-17 PROBLEM — I25.10 CAD IN NATIVE ARTERY: Status: ACTIVE | Noted: 2019-07-17

## 2019-07-25 ENCOUNTER — HOSPITAL ENCOUNTER (EMERGENCY)
Age: 74
Discharge: HOME | End: 2019-07-25
Attending: FAMILY MEDICINE
Payer: MEDICARE

## 2019-07-25 ENCOUNTER — APPOINTMENT (OUTPATIENT)
Dept: CT IMAGING | Age: 74
End: 2019-07-25
Payer: MEDICARE

## 2019-07-25 ENCOUNTER — APPOINTMENT (OUTPATIENT)
Dept: GENERAL RADIOLOGY | Age: 74
End: 2019-07-25
Payer: MEDICARE

## 2019-07-25 VITALS
HEIGHT: 72 IN | WEIGHT: 205 LBS | HEART RATE: 64 BPM | DIASTOLIC BLOOD PRESSURE: 75 MMHG | RESPIRATION RATE: 20 BRPM | SYSTOLIC BLOOD PRESSURE: 127 MMHG | OXYGEN SATURATION: 95 % | TEMPERATURE: 98.3 F | BODY MASS INDEX: 27.77 KG/M2

## 2019-07-25 DIAGNOSIS — J44.1 COPD EXACERBATION (HCC): Primary | ICD-10-CM

## 2019-07-25 DIAGNOSIS — K44.9 HIATAL HERNIA: ICD-10-CM

## 2019-07-25 DIAGNOSIS — K21.9 GASTROESOPHAGEAL REFLUX DISEASE, ESOPHAGITIS PRESENCE NOT SPECIFIED: ICD-10-CM

## 2019-07-25 LAB
ANION GAP SERPL CALCULATED.3IONS-SCNC: 15 MEQ/L (ref 8–16)
BASOPHILS # BLD: 0.4 %
BASOPHILS ABSOLUTE: 0 THOU/MM3 (ref 0–0.1)
BUN BLDV-MCNC: 36 MG/DL (ref 7–22)
CALCIUM SERPL-MCNC: 9.3 MG/DL (ref 8.5–10.5)
CHLORIDE BLD-SCNC: 95 MEQ/L (ref 98–111)
CO2: 25 MEQ/L (ref 23–33)
CREAT SERPL-MCNC: 1.4 MG/DL (ref 0.4–1.2)
EKG ATRIAL RATE: 67 BPM
EKG P AXIS: 60 DEGREES
EKG P-R INTERVAL: 184 MS
EKG Q-T INTERVAL: 408 MS
EKG QRS DURATION: 96 MS
EKG QTC CALCULATION (BAZETT): 431 MS
EKG R AXIS: 70 DEGREES
EKG T AXIS: 52 DEGREES
EKG VENTRICULAR RATE: 67 BPM
EOSINOPHIL # BLD: 1.9 %
EOSINOPHILS ABSOLUTE: 0.2 THOU/MM3 (ref 0–0.4)
ERYTHROCYTE [DISTWIDTH] IN BLOOD BY AUTOMATED COUNT: 12.9 % (ref 11.5–14.5)
ERYTHROCYTE [DISTWIDTH] IN BLOOD BY AUTOMATED COUNT: 43.4 FL (ref 35–45)
GFR SERPL CREATININE-BSD FRML MDRD: 50 ML/MIN/1.73M2
GLUCOSE BLD-MCNC: 182 MG/DL (ref 70–108)
HCT VFR BLD CALC: 42.1 % (ref 42–52)
HEMOGLOBIN: 14.4 GM/DL (ref 14–18)
IMMATURE GRANS (ABS): 0.08 THOU/MM3 (ref 0–0.07)
IMMATURE GRANULOCYTES: 0.9 %
LYMPHOCYTES # BLD: 15.5 %
LYMPHOCYTES ABSOLUTE: 1.3 THOU/MM3 (ref 1–4.8)
MCH RBC QN AUTO: 31.7 PG (ref 26–33)
MCHC RBC AUTO-ENTMCNC: 34.2 GM/DL (ref 32.2–35.5)
MCV RBC AUTO: 92.7 FL (ref 80–94)
MONOCYTES # BLD: 7.7 %
MONOCYTES ABSOLUTE: 0.7 THOU/MM3 (ref 0.4–1.3)
NUCLEATED RED BLOOD CELLS: 0 /100 WBC
OSMOLALITY CALCULATION: 283.1 MOSMOL/KG (ref 275–300)
PLATELET # BLD: 224 THOU/MM3 (ref 130–400)
PMV BLD AUTO: 9.9 FL (ref 9.4–12.4)
POTASSIUM SERPL-SCNC: 4.5 MEQ/L (ref 3.5–5.2)
PRO-BNP: 42.6 PG/ML (ref 0–900)
RBC # BLD: 4.54 MILL/MM3 (ref 4.7–6.1)
SEG NEUTROPHILS: 73.6 %
SEGMENTED NEUTROPHILS ABSOLUTE COUNT: 6.3 THOU/MM3 (ref 1.8–7.7)
SODIUM BLD-SCNC: 135 MEQ/L (ref 135–145)
TROPONIN T: < 0.01 NG/ML
WBC # BLD: 8.6 THOU/MM3 (ref 4.8–10.8)

## 2019-07-25 PROCEDURE — 6360000004 HC RX CONTRAST MEDICATION: Performed by: FAMILY MEDICINE

## 2019-07-25 PROCEDURE — 84484 ASSAY OF TROPONIN QUANT: CPT

## 2019-07-25 PROCEDURE — 6370000000 HC RX 637 (ALT 250 FOR IP): Performed by: FAMILY MEDICINE

## 2019-07-25 PROCEDURE — 99285 EMERGENCY DEPT VISIT HI MDM: CPT

## 2019-07-25 PROCEDURE — 93005 ELECTROCARDIOGRAM TRACING: CPT | Performed by: FAMILY MEDICINE

## 2019-07-25 PROCEDURE — 80048 BASIC METABOLIC PNL TOTAL CA: CPT

## 2019-07-25 PROCEDURE — 71275 CT ANGIOGRAPHY CHEST: CPT

## 2019-07-25 PROCEDURE — 94640 AIRWAY INHALATION TREATMENT: CPT

## 2019-07-25 PROCEDURE — 6360000002 HC RX W HCPCS

## 2019-07-25 PROCEDURE — 83880 ASSAY OF NATRIURETIC PEPTIDE: CPT

## 2019-07-25 PROCEDURE — 71046 X-RAY EXAM CHEST 2 VIEWS: CPT

## 2019-07-25 PROCEDURE — 96374 THER/PROPH/DIAG INJ IV PUSH: CPT

## 2019-07-25 PROCEDURE — 85025 COMPLETE CBC W/AUTO DIFF WBC: CPT

## 2019-07-25 PROCEDURE — 36415 COLL VENOUS BLD VENIPUNCTURE: CPT

## 2019-07-25 PROCEDURE — 6370000000 HC RX 637 (ALT 250 FOR IP)

## 2019-07-25 RX ORDER — PANTOPRAZOLE SODIUM 40 MG/1
40 TABLET, DELAYED RELEASE ORAL ONCE
Status: COMPLETED | OUTPATIENT
Start: 2019-07-25 | End: 2019-07-25

## 2019-07-25 RX ORDER — IPRATROPIUM BROMIDE AND ALBUTEROL SULFATE 2.5; .5 MG/3ML; MG/3ML
SOLUTION RESPIRATORY (INHALATION)
Status: COMPLETED
Start: 2019-07-25 | End: 2019-07-25

## 2019-07-25 RX ORDER — METHYLPREDNISOLONE SODIUM SUCCINATE 40 MG/ML
INJECTION, POWDER, LYOPHILIZED, FOR SOLUTION INTRAMUSCULAR; INTRAVENOUS
Status: COMPLETED
Start: 2019-07-25 | End: 2019-07-25

## 2019-07-25 RX ORDER — IPRATROPIUM BROMIDE AND ALBUTEROL SULFATE 2.5; .5 MG/3ML; MG/3ML
1 SOLUTION RESPIRATORY (INHALATION) ONCE
Status: COMPLETED | OUTPATIENT
Start: 2019-07-25 | End: 2019-07-25

## 2019-07-25 RX ORDER — SUCRALFATE 1 G/1
1 TABLET ORAL 4 TIMES DAILY
Qty: 40 TABLET | Refills: 0 | Status: ON HOLD | OUTPATIENT
Start: 2019-07-25 | End: 2021-10-07 | Stop reason: ALTCHOICE

## 2019-07-25 RX ORDER — METHYLPREDNISOLONE SODIUM SUCCINATE 125 MG/2ML
125 INJECTION, POWDER, LYOPHILIZED, FOR SOLUTION INTRAMUSCULAR; INTRAVENOUS ONCE
Status: DISCONTINUED | OUTPATIENT
Start: 2019-07-25 | End: 2019-07-25

## 2019-07-25 RX ORDER — METHYLPREDNISOLONE SODIUM SUCCINATE 125 MG/2ML
INJECTION, POWDER, LYOPHILIZED, FOR SOLUTION INTRAMUSCULAR; INTRAVENOUS
Status: DISCONTINUED
Start: 2019-07-25 | End: 2019-07-25 | Stop reason: WASHOUT

## 2019-07-25 RX ORDER — AZITHROMYCIN 250 MG/1
TABLET, FILM COATED ORAL
Qty: 6 TABLET | Refills: 0 | Status: ON HOLD | OUTPATIENT
Start: 2019-07-25 | End: 2019-08-14 | Stop reason: ALTCHOICE

## 2019-07-25 RX ORDER — METHYLPREDNISOLONE SODIUM SUCCINATE 40 MG/ML
40 INJECTION, POWDER, LYOPHILIZED, FOR SOLUTION INTRAMUSCULAR; INTRAVENOUS ONCE
Status: COMPLETED | OUTPATIENT
Start: 2019-07-25 | End: 2019-07-25

## 2019-07-25 RX ORDER — PREDNISONE 20 MG/1
40 TABLET ORAL DAILY
Qty: 10 TABLET | Refills: 0 | Status: SHIPPED | OUTPATIENT
Start: 2019-07-25 | End: 2019-07-30

## 2019-07-25 RX ADMIN — IPRATROPIUM BROMIDE AND ALBUTEROL SULFATE 1 AMPULE: 2.5; .5 SOLUTION RESPIRATORY (INHALATION) at 15:08

## 2019-07-25 RX ADMIN — METHYLPREDNISOLONE SODIUM SUCCINATE 40 MG: 40 INJECTION, POWDER, LYOPHILIZED, FOR SOLUTION INTRAMUSCULAR; INTRAVENOUS at 14:44

## 2019-07-25 RX ADMIN — PANTOPRAZOLE SODIUM 40 MG: 40 TABLET, DELAYED RELEASE ORAL at 16:51

## 2019-07-25 RX ADMIN — IOPAMIDOL 80 ML: 755 INJECTION, SOLUTION INTRAVENOUS at 16:04

## 2019-07-25 RX ADMIN — METHYLPREDNISOLONE SODIUM SUCCINATE 40 MG: 40 INJECTION, POWDER, FOR SOLUTION INTRAMUSCULAR; INTRAVENOUS at 14:44

## 2019-07-25 RX ADMIN — Medication: at 16:51

## 2019-07-25 RX ADMIN — IPRATROPIUM BROMIDE AND ALBUTEROL SULFATE 1 AMPULE: .5; 3 SOLUTION RESPIRATORY (INHALATION) at 15:08

## 2019-07-25 RX ADMIN — IPRATROPIUM BROMIDE AND ALBUTEROL SULFATE 1 AMPULE: .5; 3 SOLUTION RESPIRATORY (INHALATION) at 15:43

## 2019-07-25 ASSESSMENT — ENCOUNTER SYMPTOMS
BACK PAIN: 0
EYE REDNESS: 0
RHINORRHEA: 0
SHORTNESS OF BREATH: 1
CHEST TIGHTNESS: 1
WHEEZING: 0
DIARRHEA: 0
COUGH: 1
VOMITING: 0
NAUSEA: 0
ABDOMINAL PAIN: 0
SORE THROAT: 0
EYE DISCHARGE: 0

## 2019-07-25 ASSESSMENT — PAIN DESCRIPTION - DESCRIPTORS: DESCRIPTORS: BURNING

## 2019-07-25 ASSESSMENT — PAIN DESCRIPTION - ONSET: ONSET: ON-GOING

## 2019-07-25 ASSESSMENT — PAIN SCALES - GENERAL
PAINLEVEL_OUTOF10: 2
PAINLEVEL_OUTOF10: 3

## 2019-07-25 ASSESSMENT — PAIN DESCRIPTION - FREQUENCY: FREQUENCY: INTERMITTENT

## 2019-07-25 ASSESSMENT — PAIN DESCRIPTION - ORIENTATION: ORIENTATION: MID

## 2019-07-25 ASSESSMENT — PAIN DESCRIPTION - PROGRESSION: CLINICAL_PROGRESSION: NOT CHANGED

## 2019-07-25 ASSESSMENT — PAIN DESCRIPTION - LOCATION: LOCATION: STERNUM

## 2019-07-25 ASSESSMENT — PAIN DESCRIPTION - PAIN TYPE: TYPE: ACUTE PAIN

## 2019-07-25 NOTE — ED PROVIDER NOTES
exudate, posterior oropharyngeal edema or posterior oropharyngeal erythema. Eyes: Conjunctivae and EOM are normal.   Neck: Normal range of motion. Neck supple. No JVD present. Cardiovascular: Normal rate, regular rhythm, normal heart sounds, intact distal pulses and normal pulses. Exam reveals no gallop and no friction rub. No murmur heard. Pulmonary/Chest: Effort normal. No respiratory distress. He has no decreased breath sounds. He has wheezes (diffuse throughout). He has no rhonchi. He has no rales. Abdominal: Soft. Bowel sounds are normal. He exhibits no distension. There is no tenderness. There is no rebound, no guarding and no CVA tenderness. Musculoskeletal: Normal range of motion. He exhibits no edema. Neurological: He is alert and oriented to person, place, and time. He exhibits normal muscle tone. Coordination normal.   Skin: Skin is warm and dry. No rash noted. He is not diaphoretic. Nursing note and vitals reviewed. DIFFERENTIAL DIAGNOSIS:   Differential Dx Lists - I consider the following to be a partial list of the possible etiologies for the patient's symptoms and based on my clinical findings as well and are part of my medical decision making:    Difficulty Breathing: bronchospasm, upper airway spasms, pneumonia, pneumothorax, mass, pulmonary embolism, and others      DIAGNOSTIC RESULTS     EKG: All EKG's are interpreted by the Emergency Department Physician who either signs or Co-signs this chart in the absence of a cardiologist.  EKG interpreted by Ronal Burris MD:    Vent. Rate: 67 bpm  NJ interval: 184 ms  QRS duration: 96 ms  QTc: 431 ms  P-R-T axes: 60, 70, 52  Normal sinus rhythm   No STEMI       RADIOLOGY: non-plain film images(s) such as CT, Ultrasound and MRI are read by the radiologist.    CTA Chest W WO Contrast   Final Result   1. No pulmonary emboli or pulmonary infiltrates. 2. Cardiomegaly. 3. Small hiatal hernia.                **This report has been

## 2019-07-26 PROCEDURE — 93010 ELECTROCARDIOGRAM REPORT: CPT | Performed by: INTERNAL MEDICINE

## 2019-08-12 NOTE — PLAN OF CARE
Hospital Facility-Based Program  [facility] Intensive Cardiac Re[facility]/Traditional Cardiac Rehab  PHYSICIAN ORDER  Class I Level B based on research  Medical Director:  Dr. Tone Goodrich MD     Patient Name: Joann Mares : 1945  Referring Physician: Dr. Louisa Hinson  Date: 2019  Allergies: Allergies as of 2019 - Review Complete 2019   Allergen Reaction Noted    Bee venom Swelling 2017        Diagnosis:  PCI: RCA on 19    [x] [facility] Re[facility] with telemetry monitoring, resting and exercise        BPs & HRs with each session. Hospital setting for patient safety. [x] 72 sessions: 36 exercise sessions, 36 education sessions   [] 36 sessions: 18 exercise sessions, 18 education sessions  [] Traditional Cardiac Rehab with telemetry monitoring, resting and exercise BPs &       HRs with each session. Hospital setting for patient safety. [] 36 sessions:  32 exercise sessions, 4 education sessions     Per Patient symptoms, proceed with:   [x]Nitroglycerine 0.4mg SL every 5 minutes prn, maximum of 3, for chest pain   [x]12-lead EKG for symptoms of chest pain or noted change in heart rhythm   [x]Administer O2 per nasal cannula for symptoms of chest pain or acute dyspnea    Physician Prescribed Exercise:  Plan of Care:  Patient to attend exercise sessions with aerobic endurance and strength training for a total of 31-60 min/day, 3 days/week with supplemented 30+ minutes of aerobic exercise at home on days not participating in Cardiac Rehab. Aerobic Endurance Training  Aerobic Endurance mode (TM, AD, NS) starting at 5-8 minutes progressing by 2-3 minutes each week to a total of 15-30 minutes 2-3x/week. Arms only 5 min  Stair step increasing to 2 min  Resistance/strength training:  Hand weights starting at 1-5 lbs increasing in weight by 1-2 lbs and/or per patient tolerance weekly.   Start with 8 repetitions and increase the repetitions each exercise session per patient

## 2019-08-14 ENCOUNTER — ANESTHESIA (OUTPATIENT)
Dept: ENDOSCOPY | Age: 74
End: 2019-08-14
Payer: OTHER GOVERNMENT

## 2019-08-14 ENCOUNTER — ANESTHESIA EVENT (OUTPATIENT)
Dept: ENDOSCOPY | Age: 74
End: 2019-08-14
Payer: OTHER GOVERNMENT

## 2019-08-14 ENCOUNTER — HOSPITAL ENCOUNTER (OUTPATIENT)
Age: 74
Setting detail: OUTPATIENT SURGERY
Discharge: HOME | End: 2019-08-14
Attending: INTERNAL MEDICINE | Admitting: INTERNAL MEDICINE
Payer: OTHER GOVERNMENT

## 2019-08-14 VITALS
DIASTOLIC BLOOD PRESSURE: 67 MMHG | SYSTOLIC BLOOD PRESSURE: 135 MMHG | RESPIRATION RATE: 18 BRPM | OXYGEN SATURATION: 98 %

## 2019-08-14 VITALS
RESPIRATION RATE: 18 BRPM | HEIGHT: 72 IN | OXYGEN SATURATION: 98 % | TEMPERATURE: 97.2 F | WEIGHT: 200.6 LBS | HEART RATE: 64 BPM | DIASTOLIC BLOOD PRESSURE: 72 MMHG | BODY MASS INDEX: 27.17 KG/M2 | SYSTOLIC BLOOD PRESSURE: 136 MMHG

## 2019-08-14 PROCEDURE — 7100000001 HC PACU RECOVERY - ADDTL 15 MIN: Performed by: INTERNAL MEDICINE

## 2019-08-14 PROCEDURE — 3700000001 HC ADD 15 MINUTES (ANESTHESIA): Performed by: INTERNAL MEDICINE

## 2019-08-14 PROCEDURE — 3700000000 HC ANESTHESIA ATTENDED CARE: Performed by: INTERNAL MEDICINE

## 2019-08-14 PROCEDURE — 88305 TISSUE EXAM BY PATHOLOGIST: CPT

## 2019-08-14 PROCEDURE — 2709999900 HC NON-CHARGEABLE SUPPLY: Performed by: INTERNAL MEDICINE

## 2019-08-14 PROCEDURE — 7100000000 HC PACU RECOVERY - FIRST 15 MIN: Performed by: INTERNAL MEDICINE

## 2019-08-14 PROCEDURE — 3609019800 HC COLONOSCOPY WITH SUBMUCOSAL INJECTION: Performed by: INTERNAL MEDICINE

## 2019-08-14 PROCEDURE — 6360000002 HC RX W HCPCS: Performed by: NURSE ANESTHETIST, CERTIFIED REGISTERED

## 2019-08-14 PROCEDURE — 3609017100 HC EGD: Performed by: INTERNAL MEDICINE

## 2019-08-14 PROCEDURE — 2580000003 HC RX 258: Performed by: INTERNAL MEDICINE

## 2019-08-14 PROCEDURE — 3609010600 HC COLONOSCOPY POLYPECTOMY SNARE/COLD BIOPSY: Performed by: INTERNAL MEDICINE

## 2019-08-14 PROCEDURE — 2500000003 HC RX 250 WO HCPCS: Performed by: NURSE ANESTHETIST, CERTIFIED REGISTERED

## 2019-08-14 RX ORDER — CARVEDILOL 6.25 MG/1
6.25 TABLET ORAL 2 TIMES DAILY WITH MEALS
Status: ON HOLD | COMMUNITY
End: 2021-10-07 | Stop reason: ALTCHOICE

## 2019-08-14 RX ORDER — PROPOFOL 10 MG/ML
INJECTION, EMULSION INTRAVENOUS PRN
Status: DISCONTINUED | OUTPATIENT
Start: 2019-08-14 | End: 2019-08-14 | Stop reason: SDUPTHER

## 2019-08-14 RX ORDER — SODIUM CHLORIDE 450 MG/100ML
INJECTION, SOLUTION INTRAVENOUS CONTINUOUS
Status: DISCONTINUED | OUTPATIENT
Start: 2019-08-14 | End: 2019-08-14 | Stop reason: HOSPADM

## 2019-08-14 RX ORDER — LIDOCAINE HYDROCHLORIDE 20 MG/ML
INJECTION, SOLUTION INFILTRATION; PERINEURAL PRN
Status: DISCONTINUED | OUTPATIENT
Start: 2019-08-14 | End: 2019-08-14 | Stop reason: SDUPTHER

## 2019-08-14 RX ADMIN — PROPOFOL 350 MG: 10 INJECTION, EMULSION INTRAVENOUS at 10:48

## 2019-08-14 RX ADMIN — LIDOCAINE HYDROCHLORIDE 100 MG: 20 INJECTION, SOLUTION INFILTRATION; PERINEURAL at 10:44

## 2019-08-14 RX ADMIN — SODIUM CHLORIDE: 4.5 INJECTION, SOLUTION INTRAVENOUS at 08:43

## 2019-08-14 ASSESSMENT — PAIN SCALES - GENERAL
PAINLEVEL_OUTOF10: 0
PAINLEVEL_OUTOF10: 0

## 2019-08-14 ASSESSMENT — COPD QUESTIONNAIRES: CAT_SEVERITY: NO INTERVAL CHANGE

## 2019-08-14 ASSESSMENT — PAIN - FUNCTIONAL ASSESSMENT: PAIN_FUNCTIONAL_ASSESSMENT: 0-10

## 2019-08-22 ENCOUNTER — HOSPITAL ENCOUNTER (OUTPATIENT)
Dept: CARDIAC REHAB | Age: 74
Setting detail: THERAPIES SERIES
Discharge: HOME | End: 2019-08-22
Payer: OTHER GOVERNMENT

## 2019-08-22 VITALS — BODY MASS INDEX: 27.8 KG/M2 | HEIGHT: 72 IN | WEIGHT: 205.25 LBS

## 2019-08-22 PROCEDURE — G0422 INTENS CARDIAC REHAB W/EXERC: HCPCS

## 2019-08-22 PROCEDURE — G0423 INTENS CARDIAC REHAB NO EXER: HCPCS

## 2019-08-22 NOTE — PLAN OF CARE
Exercise Goals Exercise Goals Exercise Goals Exercise Goals    Dolly Medeiros plans to:  [] Attend exercise sessions 3x/wk  [] initiate home exercise 2-3x/wk for 10-20 min  [] Increase 6 min walk distance by 10%  [] Roger Falcon plans to:  [] Attend exercise sessions 3x/wk  [] Continue home exercise 2-3x/wk for 20-30 min  [] Increase 6 min walk distance by 10%  [] Roger Ezekiel plans to:  [] Attend exercise sessions 3x/wk  [] continue home exercise 3-4x/wk for 30-40 min  [] Increase 6 min walk distance by 10%  [] Roger Ezekiel plans to:  [] Attend exercise sessions 3x/wk  [] continue home exercise 3-4x/wk for 30-45 min  [] Increase 6 min walk distance by 10%  [] Roger Falcon achieved exercise goals?    Yes    [] No  If no, why?  **  [] Increased 6 min walk distance by 10%  [] Currently exercising 30-60 min/day, 5-7days/wk   [] Plans to continue exercise on own  [] Plans to join a local fitness center to continue exercise  [] Does not plan to continue to exercise after rehab   Return to ADL or Hobbies:  Dolly Medeiros would like to improve strength and endurance so he is able to walk for longer distances Return to ADL or Hobbies:  Dolly Medeiros would like to improve strength and endurance so he/she is able to return to ** Return to ADL or Hobbies:  Dolly Medeiros would like to improve strength and endurance so he/she is able to return to ** Return to ADL or Hobbies:  Dolly Medeiros would like to improve strength and endurance so he/she is able to return to ** Return to ADL or Hobbies:  Dolly Medeiros would like to improve strength and endurance so he/she is able to return to **             Individual Cardiac Treatment Plan - Nutrition  NUTRITION  ASSESSMENT/PLAN NUTRITION  REASSESSMENT NUTRITION   REASSESSMENT NUTRITION   REASSESSMENT NUTRITION  DISCHARGE/FOLLOW-UP   Stages of Change Stages of Change Stages of Change Stages of Change Stages of Change   [] Pre Contemplation  [] Contemplation  [x] Preparation  [] Action  [] Maintenance  [] Relapse [] Pre Contemplation  [] Preparation  [] Action  [] Maintenance  [] Relapse [] Pre Contemplation  [] Contemplation  [] Preparation  [] Action  [] Maintenance  [] Relapse [] Pre Contemplation  [] Contemplation  [] Preparation  [] Action  [] Maintenance  [] Relapse [] Pre Contemplation  [] Contemplation  [] Preparation  [] Action  [] Maintenance  [] Relapse   RISK FACTOR/EDUCATION ASSESSMENT RISK FACTOR/EDUCATION ASSESSMENT RISK FACTOR/EDUCATION ASSESSMENT RISK FACTOR/EDUCATION ASSESSMENT RISK FACTOR /EDUCATION ASSESSMENT   Hypertension  [x] Yes      [] No    Resting BP: 140/60  Peak Ex BP:124/62  Medication: carvedilol, sacubitril-valsartan   Hypertension  Resting BP: **  Peak Ex BP:**  Medication Changes:  [] Yes      [] No Hypertension  Resting BP: **  Peak Ex BP:**  Medication Changes:  [] Yes      [] No Hypertension  Resting BP: **  Peak Ex BP:**  Medication Changes:  [] Yes      [] No Hypertension  Resting BP: **  Peak Ex BP:**  Medication Changes:  [] Yes      [] No   Lipids  HLD/DLD  [x] Yes      [] No  TOTAL CHOL: 172  HDL:  32  LDL:  96  TRI  Medication: atorvastatin Lipids  Medication Changes:  [] Yes      [] No     Lipids  Medication Changes:  [] Yes      [] No     Lipids  Medication Changes:  [] Yes      [] No     Lipids    TOTAL CHOL: **  HDL:  **  LDL:  **  TRIG:  **  Medication Changes:  [] Yes      [] No   Diabetes  [x] Yes      [] No  FBS: 182           HbA1C:         Monitor BS @ home:   [] Yes      [x] No  Frequency:   Medication: metformin Diabetes  Most Recent BS:  BS have been in range  [] Yes      [] No  Medication Changes  [] Yes      [] No   Diabetes  Most Recent BS:  BS have been in range  [] Yes      [] No  Medication Changes  [] Yes      [] No     Diabetes  Most Recent BS:  BS have been in range  [] Yes      [] No  Medication Changes  [] Yes      [] No     Diabetes  Most Recent BS:  BS have been in range  [] Yes      [] No  Medication Changes  [] Yes      [] No       Tobacco notify me with any concerns   [] Other     Physician Response    [x] Cardiac rehab is reasonably and medically necessary for continuous cardiac monitoring surveillance  of patient's cardiac activity  [x] Continue continuous telemerty monitoring and notify me with any concerns   [] Other

## 2019-08-26 ENCOUNTER — HOSPITAL ENCOUNTER (OUTPATIENT)
Dept: CARDIAC REHAB | Age: 74
Setting detail: THERAPIES SERIES
Discharge: HOME | End: 2019-08-26
Payer: OTHER GOVERNMENT

## 2019-08-26 PROCEDURE — G0423 INTENS CARDIAC REHAB NO EXER: HCPCS

## 2019-08-26 PROCEDURE — G0422 INTENS CARDIAC REHAB W/EXERC: HCPCS

## 2019-08-26 NOTE — PROGRESS NOTES
Video Education Report - ICR/CR    Name:  Brenda Olea     Date:  8/26/2019  MRN: [id number]     Session #:  2  Session Length: 40 min    Recommended Videos        []01 [facility] [employer] - Program Overview   34:22    []02 Overview of [facility] Eating Plan   34:10    []03 Becoming a Neil Ana   33:08     []04 Diseases of Our Time - Part 1   34:22    []05 Calorie Density     33:39   []06 Label Reading - Part 1    32:15   []07 Move it      32.54   []08 Healthy Minds, Bodies, Hearts   32:14   []09 Dining Out - Part 1    32:28   [x]10 Heart Disease Risk Reduction   29:76   []61 Metabolic Syndrome and Belly Fat  31:52   []12 Facts on Fat     35:29   []13 Diseases of Our Time - Part 2   33:07   []14 Biology of Weight Control   32:36   []15 Biomechanical Limitations   35:20   []16 Nutrition Action Plan    34:23        Comments:  Video completed.

## 2019-08-28 ENCOUNTER — HOSPITAL ENCOUNTER (OUTPATIENT)
Dept: CARDIAC REHAB | Age: 74
Setting detail: THERAPIES SERIES
End: 2019-08-28
Payer: OTHER GOVERNMENT

## 2019-08-28 ENCOUNTER — HOSPITAL ENCOUNTER (OUTPATIENT)
Dept: CARDIAC REHAB | Age: 74
Setting detail: THERAPIES SERIES
Discharge: HOME | End: 2019-08-28
Payer: OTHER GOVERNMENT

## 2019-08-28 ENCOUNTER — APPOINTMENT (OUTPATIENT)
Dept: CARDIAC REHAB | Age: 74
End: 2019-08-28
Payer: OTHER GOVERNMENT

## 2019-08-28 PROCEDURE — G0423 INTENS CARDIAC REHAB NO EXER: HCPCS

## 2019-08-28 PROCEDURE — G0422 INTENS CARDIAC REHAB W/EXERC: HCPCS

## 2019-08-30 ENCOUNTER — HOSPITAL ENCOUNTER (OUTPATIENT)
Dept: CARDIAC REHAB | Age: 74
Setting detail: THERAPIES SERIES
Discharge: HOME | End: 2019-08-30
Payer: OTHER GOVERNMENT

## 2019-08-30 PROCEDURE — G0423 INTENS CARDIAC REHAB NO EXER: HCPCS

## 2019-08-30 PROCEDURE — G0422 INTENS CARDIAC REHAB W/EXERC: HCPCS

## 2019-09-02 ENCOUNTER — HOSPITAL ENCOUNTER (OUTPATIENT)
Dept: CARDIAC REHAB | Age: 74
Setting detail: THERAPIES SERIES
End: 2019-09-02
Payer: OTHER GOVERNMENT

## 2019-09-02 ENCOUNTER — APPOINTMENT (OUTPATIENT)
Dept: CARDIAC REHAB | Age: 74
End: 2019-09-02
Payer: OTHER GOVERNMENT

## 2019-09-04 ENCOUNTER — HOSPITAL ENCOUNTER (OUTPATIENT)
Dept: CARDIAC REHAB | Age: 74
Setting detail: THERAPIES SERIES
Discharge: HOME | End: 2019-09-04
Payer: OTHER GOVERNMENT

## 2019-09-04 PROCEDURE — G0423 INTENS CARDIAC REHAB NO EXER: HCPCS

## 2019-09-04 PROCEDURE — G0422 INTENS CARDIAC REHAB W/EXERC: HCPCS

## 2019-09-06 ENCOUNTER — HOSPITAL ENCOUNTER (OUTPATIENT)
Dept: CARDIAC REHAB | Age: 74
Setting detail: THERAPIES SERIES
Discharge: HOME | End: 2019-09-06
Payer: OTHER GOVERNMENT

## 2019-09-06 PROCEDURE — G0423 INTENS CARDIAC REHAB NO EXER: HCPCS

## 2019-09-06 PROCEDURE — G0422 INTENS CARDIAC REHAB W/EXERC: HCPCS

## 2019-09-09 ENCOUNTER — HOSPITAL ENCOUNTER (OUTPATIENT)
Dept: CARDIAC REHAB | Age: 74
Setting detail: THERAPIES SERIES
Discharge: HOME | End: 2019-09-09
Payer: OTHER GOVERNMENT

## 2019-09-09 PROCEDURE — G0423 INTENS CARDIAC REHAB NO EXER: HCPCS

## 2019-09-09 PROCEDURE — G0422 INTENS CARDIAC REHAB W/EXERC: HCPCS

## 2019-09-09 NOTE — PROGRESS NOTES
Hospital Facility-Based Program  Phase 2 Cardiac Rehab Weekly Progress Report      Patient prescribed exercise:  8:00 class. 3 times per week in rehab, 1-4 times per week at home for the amount of sessions/weeks specified by insurance. Current Levels: Treadmill:2.8mph/0% for 10 minutes,  NuStep:  60 [place] for 10 minutes, UBE: 24 [place] for 5 minutes. Progression Discussion: Increase Aerobic exercise 12 minutes to work on endurance. Attempt to increase intensity by 5-20% for each modality this week. Try to increase intensities until Dewayne Sayres rates the exercises a 13-17 on Frandy RPE.

## 2019-09-11 ENCOUNTER — APPOINTMENT (OUTPATIENT)
Dept: CARDIAC REHAB | Age: 74
End: 2019-09-11
Payer: OTHER GOVERNMENT

## 2019-09-11 ENCOUNTER — HOSPITAL ENCOUNTER (OUTPATIENT)
Dept: CARDIAC REHAB | Age: 74
Setting detail: THERAPIES SERIES
End: 2019-09-11
Payer: OTHER GOVERNMENT

## 2019-09-13 ENCOUNTER — HOSPITAL ENCOUNTER (OUTPATIENT)
Dept: CARDIAC REHAB | Age: 74
Setting detail: THERAPIES SERIES
Discharge: HOME | End: 2019-09-13
Payer: OTHER GOVERNMENT

## 2019-09-13 PROCEDURE — G0423 INTENS CARDIAC REHAB NO EXER: HCPCS

## 2019-09-13 PROCEDURE — G0422 INTENS CARDIAC REHAB W/EXERC: HCPCS

## 2019-09-16 ENCOUNTER — HOSPITAL ENCOUNTER (OUTPATIENT)
Dept: CARDIAC REHAB | Age: 74
Setting detail: THERAPIES SERIES
Discharge: HOME | End: 2019-09-16
Payer: OTHER GOVERNMENT

## 2019-09-16 PROCEDURE — G0423 INTENS CARDIAC REHAB NO EXER: HCPCS

## 2019-09-16 PROCEDURE — G0422 INTENS CARDIAC REHAB W/EXERC: HCPCS

## 2019-09-18 ENCOUNTER — HOSPITAL ENCOUNTER (OUTPATIENT)
Dept: CARDIAC REHAB | Age: 74
Setting detail: THERAPIES SERIES
Discharge: HOME | End: 2019-09-18
Payer: OTHER GOVERNMENT

## 2019-09-18 PROCEDURE — G0422 INTENS CARDIAC REHAB W/EXERC: HCPCS

## 2019-09-18 PROCEDURE — G0423 INTENS CARDIAC REHAB NO EXER: HCPCS

## 2019-09-18 NOTE — PLAN OF CARE
Limitations/  [x] Yes    [] No  If yes please list:  2 knee replacements, sharpanel in legs, rods in back     Orthopedic Limitations  *If patient has orthopedic issue:   Actions/  accomodations needed to make Rudolph Velasco successful : NuStep instead of AD Orthopedic Limitations   Orthopedic Limitations   Orthopedic Limitations     Fall Risk  Fall risk assessed? [x] Yes      [] No    Balance Issues? [x] Yes      [] No     [] Walker [] Cane    [x] Safety issues reviewed      Fall Risk  *If patient is a fall risk, action needed to accommodate: [employer] [id number] Exercise  [] Yes    [x] No   Home Exercise  [x] Yes    [] No  Type: walking  Frequency:3x/wk  Duration: 30 min Home Exercise  [] Yes    [] No  Type: **  Frequency: **  Duration: ** Home Exercise  [] Yes    [] No  Type: **  Frequency: **  Duration: ** Home Exercise  [] Yes    [] No  Type: **  Frequency: **  Duration: **   Angina with Activity? [x] Yes    [] No  Angina Management: usually gone before he can take anything Angina with Activity? [x] Yes    [] No  Angina Management: rest, goes away quickly Angina with Activity? [] Yes    [] No  Angina Management: ** Angina with Activity? [] Yes    [] No  Angina Management: ** Angina with Activity?   [] Yes    [] No  Angina Management: **   EXERCISE PLAN EXERCISE PLAN EXERCISE PLAN EXERCISE PLAN EXERCISE PLAN   *Interventions* *Interventions* *Interventions* *Interventions* *Interventions*   Exercise Prescription  (per physician & CR staff) Exercise Prescription  (per physician & CR staff) Exercise Prescription  (per physician & CR staff) Exercise Prescription  (per physician & CR staff) Exercise Prescription  (per physician & CR staff)   Cardiovascular Cardiovascular Cardiovascular Cardiovascular Cardiovascular   Mode:    [x] Treadmill (TM)  [x] Schwinn Airdyne (AD)  [x] Arms Ergometer (AE)  [] NuStep  [] Elliptical (E) MODE:    [x] Treadmill (TM)  [] Schwinn Airdyne (AD)  [x] Arms Ergometer Exercise  [x] Yes      [] No    All Exercise Education Completed  [] Yes      [] No   *Goals* *Goals* *Goals* *Goals* *Goals*   Initial Exercise  Exercise Goals Exercise Goals Exercise Goals Exercise Goals    Cristiano plans to:  [x] Attend exercise sessions 3x/wk  [x] initiate home exercise 2-3x/wk for 10-20 min  [x] Increase 6 min walk distance by 10%   Cristiano plans to:  [x] Attend exercise sessions 3x/wk  [x] Continue home exercise 2-3x/wk for 20-30 min  [x] Increase 6 min walk distance by 10%   Cristiano plans to:  [] Attend exercise sessions 3x/wk  [] continue home exercise 3-4x/wk for 30-40 min  [] Increase 6 min walk distance by 10%  [] Mitch Hinojosa plans to:  [] Attend exercise sessions 3x/wk  [] continue home exercise 3-4x/wk for 30-45 min  [] Increase 6 min walk distance by 10%  [] ** Cristiano achieved exercise goals? Yes    [] No  If no, why?  **  [] Increased 6 min walk distance by 10%  [] Currently exercising 30-60 min/day, 5-7days/wk   [] Plans to continue exercise on own  [] Plans to join a local fitness center to continue exercise  [] Does not plan to continue to exercise after rehab   Return to ADL or Hobbies:  Josie Valladares would like to improve strength and endurance so he is able to walk for longer distances Return to ADL or Hobbies:  Josie Valladares would like to improve strength and endurance so he is able to walk for longer distances.  Return to ADL or Hobbies:  Josie Valladares would like to improve strength and endurance so he/she is able to return to ** Return to ADL or Hobbies:  Josie Valladares would like to improve strength and endurance so he/she is able to return to ** Return to ADL or Hobbies:  Josie Valladares would like to improve strength and endurance so he/she is able to return to **             Individual Cardiac Treatment Plan - Nutrition  NUTRITION  ASSESSMENT/PLAN NUTRITION  REASSESSMENT NUTRITION   REASSESSMENT NUTRITION   REASSESSMENT NUTRITION  DISCHARGE/FOLLOW-UP   Stages of Change Stages of Change Stages of Change check if needed. [] Dietitian Consult   [] Wt. Management Referral  [] Other:  Professional Referral  Please check if needed. [] Dietitian Consult   [] Wt. Management Referral  [] Other:    *Education* *Education* *Education* *Education* *Education*   Nutritional Education Recommended    [x]1:1 Registered Dietitian    Workshops: 2  *Label Reading   *Menu  *Targeting Nutrition Priorities  *Fueling a Healthy Body Nutritional Education Attended    8/28/19  Targeting Nutrition Priorities    9/18/19  Label Reading - video Nutritional Education Attended Nutritional Education Attended All Sessions Completed? Cooking School    (x)5 sessions recommended    [employer]  Sessions Completed      [x]Simple Sides -9/6/19       Cooking School  Sessions Completed    []Adding Flavor  []Fast & Healthy     Breakfasts  []Salads & Dressings  []Soups & Simple     Sauces  []Appetizers &     Snacks  []Delicious Desserts  []Plant Proteins  []Fast Evening Meals  [] Weekend Breakfasts  []Cook once, Eat       twice  [] Meat Alternatives   Cooking School  Sessions Completed     Cooking School    # of sessions completed:  **   *Goals* *Goals* *Goals* *Goals* *Goals*   Cristiano's nutritional goals are as follows:  Complete and return diet survey Cristiano's nutritional goals are as follows:  [x] Attend Nutrition Workshops  [x] Attend 1:1   [x] Attend Cooking Classes   Cristiano's nutritional goals are as follows:  [] Attend Nutrition Workshops  [] Attend 1:1   [] Attend Cooking Classes  [] Complete and return diet survey  [] ** Cristiano's nutritional goals are as follows:  [] Attend Nutrition Workshops  [] Attend 1:1   [] Attend Cooking Classes  [] ** Cristiano achieved nutritional goals   [] Yes    [] No  If no, why?   Use knowledge gained to continue Pritikin eating plan at home     Individual Cardiac Treatment Plan - Psychosocial  PSYCHOSOCIAL  ASSESSMENT/PLAN PSYCHOSOCIAL  REASSESSMENT PSYCHOSOCIAL   REASSESSMENT PSYCHOSOCIAL   REASSESSMENT [x] No   Diabetes  Most Recent BS:  BS have been in range  [] Yes      [] No  Medication Changes  [] Yes      [] No     Diabetes  Most Recent BS:  BS have been in range  [] Yes      [] No  Medication Changes  [] Yes      [] No     Diabetes  Most Recent BS:  BS have been in range  [] Yes      [] No  Medication Changes  [] Yes      [] No       Tobacco Use  []Current  [x]Former  []Never    Years smoked: 13    Date Quit: 1976    Smokeless Tobacco use:   [] Yes      [x] No   Tobacco Use  Change in smoking status   [] Yes      [x] No       Tobacco Use  Change in smoking status   [] Yes      [] No    Quit date: **   Tobacco Use  Change in smoking status   [] Yes      [] No    Quit date: ** Tobacco Use  Change in smoking status   [] Yes      [] No    Quit date: **            Learning Barriers  Please select one:  []Speech  []Literacy  []Hearing  []Cognitive  []Vision  [x]Ready to Learn Learning Barriers Addressed:   [x] Yes      [] No   Learning Barriers Addressed:   [] Yes      [] No   Learning Barriers Addressed:  [] Yes      [] No Learning Barriers Addressed:  [] Yes      [] No     RISK FACTOR/EDUCATION PLAN RISK FACTOR/EDUCATION PLAN RISK FACTOR/EDUCATION PLAN RISK FACTOR/EDUCATION PLAN RISK FACTOR/EDUCATION PLAN   *Interventions* *Interventions* *Interventions* *Interventions* *Interventions*   Recommended Educational Videos    [x] Overview of The Pritikin Eating Plan  [x] Heart Disease Risk Reduction  [x] Calorie Density  [x] Label Reading-Part 1  [x] Move It! [x] Healthy Minds, Bodies, Hearts  [x] Dining Out-Part 1     Completed Videos      8/22/19  Overview of The Pritikin Eating Plan    9/9/19  Calorie Density    9/16/19  Label Reading-Part 1    8/26/19  Heart Disease Risk Reduction Completed Videos Completed Videos Recommended Educational Videos Completed    [] Yes      [] No    **If not completed, Why? **          Smoking Cessation/Relaspe Prevention Intervention needed?   [] Yes      [x] No         Professional

## 2019-09-20 ENCOUNTER — HOSPITAL ENCOUNTER (OUTPATIENT)
Dept: CARDIAC REHAB | Age: 74
Setting detail: THERAPIES SERIES
Discharge: HOME | End: 2019-09-20
Payer: OTHER GOVERNMENT

## 2019-09-20 PROCEDURE — G0423 INTENS CARDIAC REHAB NO EXER: HCPCS

## 2019-09-20 PROCEDURE — G0422 INTENS CARDIAC REHAB W/EXERC: HCPCS

## 2019-09-23 ENCOUNTER — HOSPITAL ENCOUNTER (OUTPATIENT)
Dept: CARDIAC REHAB | Age: 74
Setting detail: THERAPIES SERIES
Discharge: HOME | End: 2019-09-23
Payer: OTHER GOVERNMENT

## 2019-09-23 PROCEDURE — G0422 INTENS CARDIAC REHAB W/EXERC: HCPCS

## 2019-09-23 PROCEDURE — G0423 INTENS CARDIAC REHAB NO EXER: HCPCS

## 2019-09-23 NOTE — PROGRESS NOTES
Hospital Facility-Based Program  Phase 2 Cardiac Rehab Weekly Progress Report      Patient prescribed exercise:  8:00 class. 3 times per week in rehab, 1-4 times per week at home for the amount of sessions/weeks specified by insurance. Current Levels: Treadmill: 2. 8mph/1% for 12 minutes, NuStep:  60 [place] for 12 minutes, UBE: Level 22W for 5 minutes. Progression Discussion: Maintain/Increase Aerobic exercise 15 minutes to work on endurance. Attempt to increase intensity by 5-20% for each modality this week. Try to increase intensities until Dominic Morning rates the exercises a 13-17 on Frandy RPE.

## 2019-09-25 ENCOUNTER — HOSPITAL ENCOUNTER (OUTPATIENT)
Dept: CARDIAC REHAB | Age: 74
Setting detail: THERAPIES SERIES
Discharge: HOME | End: 2019-09-25
Payer: OTHER GOVERNMENT

## 2019-09-25 PROCEDURE — G0422 INTENS CARDIAC REHAB W/EXERC: HCPCS

## 2019-09-25 PROCEDURE — G0423 INTENS CARDIAC REHAB NO EXER: HCPCS

## 2019-09-27 ENCOUNTER — HOSPITAL ENCOUNTER (OUTPATIENT)
Dept: CARDIAC REHAB | Age: 74
Setting detail: THERAPIES SERIES
Discharge: HOME | End: 2019-09-27
Payer: OTHER GOVERNMENT

## 2019-09-27 PROCEDURE — G0423 INTENS CARDIAC REHAB NO EXER: HCPCS

## 2019-09-27 PROCEDURE — G0422 INTENS CARDIAC REHAB W/EXERC: HCPCS

## 2019-09-30 ENCOUNTER — HOSPITAL ENCOUNTER (OUTPATIENT)
Dept: CARDIAC REHAB | Age: 74
Setting detail: THERAPIES SERIES
Discharge: HOME | End: 2019-09-30
Payer: OTHER GOVERNMENT

## 2019-09-30 PROCEDURE — G0423 INTENS CARDIAC REHAB NO EXER: HCPCS

## 2019-09-30 PROCEDURE — G0422 INTENS CARDIAC REHAB W/EXERC: HCPCS

## 2019-10-02 ENCOUNTER — HOSPITAL ENCOUNTER (OUTPATIENT)
Dept: CARDIAC REHAB | Age: 74
Setting detail: THERAPIES SERIES
Discharge: HOME | End: 2019-10-02
Payer: OTHER GOVERNMENT

## 2019-10-02 PROCEDURE — G0423 INTENS CARDIAC REHAB NO EXER: HCPCS

## 2019-10-02 PROCEDURE — G0422 INTENS CARDIAC REHAB W/EXERC: HCPCS

## 2019-10-04 ENCOUNTER — HOSPITAL ENCOUNTER (OUTPATIENT)
Dept: CARDIAC REHAB | Age: 74
Setting detail: THERAPIES SERIES
Discharge: HOME | End: 2019-10-04
Payer: OTHER GOVERNMENT

## 2019-10-04 PROCEDURE — G0422 INTENS CARDIAC REHAB W/EXERC: HCPCS

## 2019-10-04 PROCEDURE — G0423 INTENS CARDIAC REHAB NO EXER: HCPCS

## 2019-10-07 ENCOUNTER — APPOINTMENT (OUTPATIENT)
Dept: CARDIAC REHAB | Age: 74
End: 2019-10-07
Payer: OTHER GOVERNMENT

## 2019-10-07 ENCOUNTER — HOSPITAL ENCOUNTER (OUTPATIENT)
Dept: CARDIAC REHAB | Age: 74
Setting detail: THERAPIES SERIES
Discharge: HOME | End: 2019-10-07
Payer: OTHER GOVERNMENT

## 2019-10-07 PROCEDURE — G0423 INTENS CARDIAC REHAB NO EXER: HCPCS

## 2019-10-07 PROCEDURE — G0422 INTENS CARDIAC REHAB W/EXERC: HCPCS

## 2019-10-09 ENCOUNTER — APPOINTMENT (OUTPATIENT)
Dept: CARDIAC REHAB | Age: 74
End: 2019-10-09
Payer: OTHER GOVERNMENT

## 2019-10-09 ENCOUNTER — HOSPITAL ENCOUNTER (OUTPATIENT)
Dept: CARDIAC REHAB | Age: 74
Setting detail: THERAPIES SERIES
End: 2019-10-09
Payer: OTHER GOVERNMENT

## 2019-10-11 ENCOUNTER — HOSPITAL ENCOUNTER (OUTPATIENT)
Dept: CARDIAC REHAB | Age: 74
Setting detail: THERAPIES SERIES
End: 2019-10-11
Payer: OTHER GOVERNMENT

## 2019-10-14 ENCOUNTER — HOSPITAL ENCOUNTER (OUTPATIENT)
Dept: CARDIAC REHAB | Age: 74
Setting detail: THERAPIES SERIES
End: 2019-10-14
Payer: OTHER GOVERNMENT

## 2019-10-14 ENCOUNTER — APPOINTMENT (OUTPATIENT)
Dept: CARDIAC REHAB | Age: 74
End: 2019-10-14
Payer: OTHER GOVERNMENT

## 2019-10-16 ENCOUNTER — APPOINTMENT (OUTPATIENT)
Dept: CARDIAC REHAB | Age: 74
End: 2019-10-16
Payer: OTHER GOVERNMENT

## 2019-10-18 ENCOUNTER — APPOINTMENT (OUTPATIENT)
Dept: CARDIAC REHAB | Age: 74
End: 2019-10-18
Payer: OTHER GOVERNMENT

## 2019-10-21 ENCOUNTER — APPOINTMENT (OUTPATIENT)
Dept: CARDIAC REHAB | Age: 74
End: 2019-10-21
Payer: OTHER GOVERNMENT

## 2019-10-23 ENCOUNTER — APPOINTMENT (OUTPATIENT)
Dept: CARDIAC REHAB | Age: 74
End: 2019-10-23
Payer: OTHER GOVERNMENT

## 2021-02-05 ENCOUNTER — IMMUNIZATION (OUTPATIENT)
Dept: PRIMARY CARE CLINIC | Age: 76
End: 2021-02-05
Payer: MEDICARE

## 2021-02-05 PROCEDURE — 0011A COVID-19, MODERNA VACCINE 100MCG/0.5ML DOSE: CPT | Performed by: FAMILY MEDICINE

## 2021-02-05 PROCEDURE — 91301 COVID-19, MODERNA VACCINE 100MCG/0.5ML DOSE: CPT | Performed by: FAMILY MEDICINE

## 2021-03-05 ENCOUNTER — IMMUNIZATION (OUTPATIENT)
Dept: PRIMARY CARE CLINIC | Age: 76
End: 2021-03-05
Payer: MEDICARE

## 2021-03-05 PROCEDURE — 91301 COVID-19, MODERNA VACCINE 100MCG/0.5ML DOSE: CPT

## 2021-03-05 PROCEDURE — 0012A COVID-19, MODERNA VACCINE 100MCG/0.5ML DOSE: CPT

## 2021-09-13 ENCOUNTER — HOSPITAL ENCOUNTER (OUTPATIENT)
Dept: INTERVENTIONAL RADIOLOGY/VASCULAR | Age: 76
Discharge: HOME | End: 2021-09-13
Payer: MEDICARE

## 2021-09-13 DIAGNOSIS — I73.9 CLAUDICATION (HCC): ICD-10-CM

## 2021-09-13 PROCEDURE — 93923 UPR/LXTR ART STDY 3+ LVLS: CPT

## 2022-05-24 ENCOUNTER — OFFICE VISIT (OUTPATIENT)
Dept: CARDIOLOGY CLINIC | Age: 77
End: 2022-05-24
Payer: OTHER GOVERNMENT

## 2022-05-24 VITALS
WEIGHT: 204 LBS | HEART RATE: 58 BPM | BODY MASS INDEX: 27.63 KG/M2 | SYSTOLIC BLOOD PRESSURE: 100 MMHG | DIASTOLIC BLOOD PRESSURE: 48 MMHG | HEIGHT: 72 IN | RESPIRATION RATE: 16 BRPM

## 2022-05-24 DIAGNOSIS — I49.9 VENTRICULAR ARRHYTHMIA: Primary | ICD-10-CM

## 2022-05-24 DIAGNOSIS — R06.02 SOB (SHORTNESS OF BREATH) ON EXERTION: ICD-10-CM

## 2022-05-24 PROCEDURE — 99213 OFFICE O/P EST LOW 20 MIN: CPT | Performed by: INTERNAL MEDICINE

## 2022-05-24 PROCEDURE — 93000 ELECTROCARDIOGRAM COMPLETE: CPT | Performed by: INTERNAL MEDICINE

## 2022-05-24 RX ORDER — NITROGLYCERIN 0.4 MG/1
0.4 TABLET SUBLINGUAL EVERY 5 MIN PRN
Qty: 25 TABLET | Refills: 3 | Status: SHIPPED | OUTPATIENT
Start: 2022-05-24

## 2022-05-24 RX ORDER — CARVEDILOL 25 MG/1
25 TABLET ORAL DAILY
Qty: 90 TABLET | Refills: 3 | Status: SHIPPED | OUTPATIENT
Start: 2022-05-24

## 2022-05-24 RX ORDER — CLOPIDOGREL BISULFATE 75 MG/1
75 TABLET ORAL DAILY
Qty: 90 TABLET | Refills: 3 | Status: SHIPPED | OUTPATIENT
Start: 2022-05-24

## 2022-05-24 RX ORDER — ATORVASTATIN CALCIUM 80 MG/1
80 TABLET, FILM COATED ORAL DAILY
Qty: 90 TABLET | Refills: 3 | Status: SHIPPED | OUTPATIENT
Start: 2022-05-24

## 2022-05-24 ASSESSMENT — ENCOUNTER SYMPTOMS
APNEA: 0
NAUSEA: 0
VOMITING: 0
SHORTNESS OF BREATH: 1
COLOR CHANGE: 0
ABDOMINAL DISTENTION: 0
CHEST TIGHTNESS: 0
STRIDOR: 0
CHOKING: 0
TROUBLE SWALLOWING: 0
ABDOMINAL PAIN: 0
ANAL BLEEDING: 0
BLOOD IN STOOL: 0
COUGH: 0
WHEEZING: 0
VOICE CHANGE: 0

## 2022-05-24 NOTE — PROGRESS NOTES
[address] [facility]  [place] [address]  Dept: [address]  Loc: [phone]     5/24/2022       Wilber Coil is here today for   Chief Complaint   Patient presents with    6 Month Follow-Up    Shortness of Breath     increased SOB for past 2-3 months            Referring Physician:  No ref. provider found     Patient Active Problem List   Diagnosis    Spinal stenosis, lumbar region, with neurogenic claudication    Metabolic acidosis    Leukocytosis    Normocytic anemia    DM (diabetes mellitus), type 2, uncontrolled ([facility] [address]    HTN (hypertension)    MALIKA (obstructive sleep apnea)    HLD (hyperlipidemia)    Asthma    Depression    Constipation    H/O class III angina pectoris    Ventricular arrhythmia    Paroxysmal atrial fibrillation (HCC)    Obesity due to excess calories    CAD in native artery       Review of Systems   Constitutional: Negative for activity change, appetite change, fatigue, fever and unexpected weight change. HENT: Negative for congestion, trouble swallowing and voice change. Eyes: Negative for visual disturbance. Respiratory: Positive for shortness of breath. Negative for apnea, cough, choking, chest tightness, wheezing and stridor. Cardiovascular: Negative for chest pain, palpitations and leg swelling. Gastrointestinal: Negative for abdominal distention, abdominal pain, anal bleeding, blood in stool, nausea and vomiting. Endocrine: Negative for cold intolerance and heat intolerance. Genitourinary: Negative for hematuria. Musculoskeletal: Negative for arthralgias, gait problem, joint swelling and myalgias. Skin: Negative for color change and rash. Allergic/Immunologic: Negative for environmental allergies and food allergies. Neurological: Negative for dizziness, tremors, syncope, facial asymmetry, weakness, light-headedness, numbness and headaches. Hematological: Does not bruise/bleed easily. Psychiatric/Behavioral: Negative for agitation, behavioral problems and sleep disturbance. Past Medical History:   Diagnosis Date    Cancer ([facility] [address]     skin basal cell    Diabetes mellitus ([facility] [address]     Hypertension     Prolonged emergence from general anesthesia     had apnea with surgery    Sleep apnea     has CPAP    Wheeze        Allergies   Allergen Reactions    Bee Venom Swelling       Current Outpatient Medications   Medication Sig Dispense Refill    polyethyl glycol-propyl glycol 0.4-0.3 % (SYSTANE) 0.4-0.3 % ophthalmic solution 1 drop as needed for Dry Eyes      empagliflozin (JARDIANCE) 10 MG tablet Take 1 tablet by mouth daily 90 tablet 3    clopidogrel (PLAVIX) 75 MG tablet Take 1 tablet by mouth daily 90 tablet 3    sacubitril-valsartan (ENTRESTO) 49-51 MG per tablet Take 1 tablet by mouth 2 times daily 180 tablet 3    atorvastatin (LIPITOR) 80 MG tablet Take 1 tablet by mouth daily 90 tablet 3    carvedilol (COREG) 25 MG tablet Take 1 tablet by mouth daily 90 tablet 3    nitroGLYCERIN (NITROSTAT) 0.4 MG SL tablet Place 1 tablet under the tongue every 5 minutes as needed for Chest pain up to max of 3 total doses.  If no relief after 1 dose, call 911. 25 tablet 3    PANTOPRAZOLE SODIUM PO Take 40 mg by mouth 2 times daily      ezetimibe (ZETIA) 10 MG tablet Take 10 mg by mouth daily      Omega-3 Fatty Acids (FISH OIL) 1000 MG CAPS Take 1,000 mg by mouth daily       bumetanide (BUMEX) 2 MG tablet Take 1 tablet by mouth daily 30 tablet 3    tiotropium (SPIRIVA) 18 MCG inhalation capsule Inhale 18 mcg into the lungs daily      budesonide-formoterol (SYMBICORT) 160-4.5 MCG/ACT AERO Inhale 2 puffs into the lungs 2 times daily      docusate sodium (COLACE) 100 MG capsule Take 100 mg by mouth daily as needed       gabapentin (NEURONTIN) 600 MG tablet Take 600 mg by mouth 3 times daily      naproxen (NAPROSYN) 500 MG tablet Take 500 mg by mouth as needed       metFORMIN (GLUCOPHAGE) 1000 MG tablet Take 1,000 mg by mouth 2 times daily (with meals).  aspirin 81 MG tablet Take 81 mg by mouth daily.  PARoxetine (PAXIL) 30 MG tablet Take 60 mg by mouth every morning       Albuterol Sulfate (PROVENTIL HFA IN) Inhale 2 puffs into the lungs 4 times daily as needed        No current facility-administered medications for this visit. Social History     Socioeconomic History    Marital status:      Spouse name: leo    Number of children: None    Years of education: None    Highest education level: None   Occupational History    None   Tobacco Use    Smoking status: Former Smoker     Quit date: 1976     Years since quittin.4    Smokeless tobacco: Former User   Vaping Use    Vaping Use: Never used   Substance and Sexual Activity    Alcohol use: Not Currently    Drug use: Yes     Types: Marijuana Lovena Mems)     Comment: social    Sexual activity: None   Other Topics Concern    None   Social History Narrative    None     Social Determinants of Health     Financial Resource Strain:     Difficulty of Paying Living Expenses: Not on file   Food Insecurity:     Worried About Running Out of Food in the Last Year: Not on file    Nikolai of Food in the Last Year: Not on file   Transportation Needs:     Lack of Transportation (Medical): Not on file    Lack of Transportation (Non-Medical):  Not on file   Physical Activity:     Days of Exercise per Week: Not on file    Minutes of Exercise per Session: Not on file   Stress:     Feeling of Stress : Not on file   Social Connections:     Frequency of Communication with Friends and Family: Not on file    Frequency of Social Gatherings with Friends and Family: Not on file    Attends Buddhist Services: Not on file    Active Member of Clubs or Organizations: Not on file    Attends Club or Organization Meetings: Not on file    Marital Status: Not on file   Intimate Partner Violence:     Fear of Current or Ex-Partner: Not on file    Emotionally Abused: Not on file    Physically Abused: Not on file    Sexually Abused: Not on file   Housing Stability:     Unable to Pay for Housing in the Last Year: Not on file    Number of Places Lived in the Last Year: Not on file    Unstable Housing in the Last Year: Not on file       Family History   Problem Relation Age of Onset    Cancer Father     Emphysema Mother     Other Maternal Grandmother         killed in [address]    Stroke Maternal Grandfather     Kidney Disease Paternal Grandmother     Cancer Paternal Uncle     Heart Disease Paternal Uncle     Cancer Paternal Uncle     Hearing Loss Brother     Diabetes Neg Hx     High Blood Pressure Neg Hx        Blood pressure (!) 100/48, pulse 58, resp. rate 16, height 5' 11.5\" (1.816 m), weight 204 lb (92.5 kg). Physical Exam:    General Appearance: alert and oriented to person, place and time, in no acute distress  Cardiovascular: normal rate, regular rhythm, normal S1 and S2, no murmurs, rubs, clicks, or gallops, distal pulses intact, no carotid bruits, no JVD  Pulmonary/Chest: clear to auscultation bilaterally- no wheezes, rales or rhonchi, normal air movement, no respiratory distress  Abdomen: soft, non-tender, non-distended, normal bowel sounds, no masses   Extremities: no cyanosis, clubbing or edema, pulse   Skin: warm and dry, no rash or erythema  Head: normocephalic and atraumatic  Eyes: pupils equal, round, and reactive to light  Neck: supple and non-tender without mass, no thyromegaly   Musculoskeletal: normal range of motion, no joint swelling, deformity or tenderness  Neurological: alert, oriented, normal speech, no focal findings or movement disorder noted    Lab Data:    Cardiac Enzymes:  No results for input(s): CKTOTAL, CKMB, CKMBINDEX, TROPONINI in the last 72 hours.     CBC:   Lab Results   Component Value Date    WBC 6.1 10/08/2021    RBC 4.18 10/08/2021    HGB 13.4 10/08/2021    HCT 38.7 10/08/2021     10/08/2021       CMP:    Lab Results   Component Value Date     10/07/2021    K 4.6 10/07/2021     10/07/2021    CO2 24 10/07/2021    BUN 13 10/07/2021    CREATININE 1.1 10/07/2021    LABGLOM 65 10/07/2021    GLUCOSE 166 10/07/2021    CALCIUM 9.1 10/07/2021       Hepatic Function Panel:    Lab Results   Component Value Date    ALKPHOS 72 10/07/2021    ALT 28 10/07/2021    AST 18 10/07/2021    PROT 6.8 10/07/2021    BILITOT 0.4 10/07/2021    BILIDIR <0.2 07/25/2017    LABALBU 4.4 10/07/2021       Magnesium:    Lab Results   Component Value Date    MG 1.8 07/26/2017       PT/INR:    Lab Results   Component Value Date    INR 1.02 10/07/2021       HgBA1c:    Lab Results   Component Value Date    LABA1C 7.2 03/11/2015       FLP:    Lab Results   Component Value Date    TRIG 282 10/07/2021    HDL 29 10/07/2021    LDLCALC 87 10/07/2021       TSH:  No results found for: TSH     Diagnosis Orders   1. Ventricular arrhythmia  09778 - [place] ELECTROCARDIOGRAM, COMPLETE    CARDIAC STRESS TEST EXERCISE ONLY   2. SOB (shortness of breath) on exertion  CARDIAC STRESS TEST EXERCISE ONLY        Assessment/Plan    Dictating on Miller Amabile patient is a 69years old gentleman who is known to have a history of coronary artery disease extensive in nature he had prior MIs he has a history of cardiomyopathy he has a history of stent to the RCA in September last year he is here complaining of an exertional shortness of breath could be equivocal to the angina pectoris he lost about 10 pounds. He is concerned about his symptoms his EKG showed sinus bradycardia. The patient will be scheduled to have a walking stress test.  The patient patient will be seen after that. He will continue current medication.   His lab work his EKG and our thoughts of treatment were discussed with him pending the results of his stress test further recommendation will be made he is to seek medical attention with any change in clinical condition    Orders Placed This Encounter   Procedures    CARDIAC STRESS TEST EXERCISE ONLY     Meds to hold:     Standing Status:   Future     Standing Expiration Date:   5/24/2023     Order Specific Question:   Reason for Exam?     Answer:   Shortness of breath    [address]28 - [place] ELECTROCARDIOGRAM, COMPLETE       Return in about 6 months (around 11/24/2022) for cad.      61 Peggy Leger MD

## 2022-05-27 ENCOUNTER — PROCEDURE VISIT (OUTPATIENT)
Dept: CARDIOLOGY CLINIC | Age: 77
End: 2022-05-27
Payer: OTHER GOVERNMENT

## 2022-05-27 VITALS
BODY MASS INDEX: 28.56 KG/M2 | HEIGHT: 71 IN | WEIGHT: 204 LBS | HEART RATE: 87 BPM | DIASTOLIC BLOOD PRESSURE: 40 MMHG | SYSTOLIC BLOOD PRESSURE: 100 MMHG

## 2022-05-27 DIAGNOSIS — I25.10 CAD IN NATIVE ARTERY: ICD-10-CM

## 2022-05-27 DIAGNOSIS — I49.9 VENTRICULAR ARRHYTHMIA: ICD-10-CM

## 2022-05-27 DIAGNOSIS — R06.02 SOB (SHORTNESS OF BREATH) ON EXERTION: ICD-10-CM

## 2022-05-27 PROCEDURE — 93015 CV STRESS TEST SUPVJ I&R: CPT | Performed by: INTERNAL MEDICINE

## 2022-05-27 NOTE — PROGRESS NOTES
[address] [facility] [address]  Dept: [address]: [phone]  5/27/2022  No ref. provider found     [facility]   1945  [phone]      WALKING TREADMILL STRESS TEST    PROCEDURES:    1. WALKING TREADMILL STRESS TEST ACCORDING TO BOBBY PROTOCOL.

## 2022-06-09 ENCOUNTER — PRE-PROCEDURE TELEPHONE (OUTPATIENT)
Dept: INPATIENT UNIT | Age: 77
End: 2022-06-09

## 2022-06-09 NOTE — PROGRESS NOTES
Message left with following info;NPO after midnight  Bring drivers license and insurance information  Wear comfortable clean clothes  Shower morning of and night before with liquid antibacterial soap  Remove jewelry   May have to stay overnight if have PTCA/stent  Bring medications in original bottles  Made aware of visitors limit to 1 at a time  Follow all instructions given by your physician  Please notify doctor office if you need to cancel or reschedule your procedure   needed at discharge

## 2022-06-16 ENCOUNTER — HOSPITAL ENCOUNTER (OUTPATIENT)
Dept: INPATIENT UNIT | Age: 77
Discharge: HOME | End: 2022-06-16

## 2022-06-20 NOTE — PRE-PROCEDURE INSTRUCTIONS
[place] on 2nd floor [facility]  NPO after midnight  Bring drivers license and insurance information  Wear comfortable clean clothes  Shower morning of and night before with liquid antibacterial soap  Remove jewelry   May have to stay overnight if have PTCA/stent - bring small overnight bag  Bring medications in original bottles - may take am meds with small amount of water. Do not take any diabetic meds day of procedure. Made aware of visitors limit to 2 at a time  Follow all instructions given by your physician  Please notify doctor office if you need to cancel or reschedule your procedure   needed at discharge  Bring and wear your mask.   If you use CPap or BiPap for sleep apnea, please bring machine with you  Leave valuables at home

## 2022-06-22 RX ORDER — DIPHENHYDRAMINE HCL 25 MG
50 TABLET ORAL ONCE
Status: CANCELLED | OUTPATIENT
Start: 2022-06-22 | End: 2022-06-22

## 2022-06-22 RX ORDER — SODIUM CHLORIDE 9 MG/ML
INJECTION, SOLUTION INTRAVENOUS PRN
Status: CANCELLED | OUTPATIENT
Start: 2022-06-22

## 2022-06-23 ENCOUNTER — HOSPITAL ENCOUNTER (OUTPATIENT)
Dept: INPATIENT UNIT | Age: 77
Discharge: HOME | End: 2022-06-23
Attending: INTERNAL MEDICINE | Admitting: INTERNAL MEDICINE
Payer: OTHER GOVERNMENT

## 2022-06-23 VITALS
HEIGHT: 71 IN | HEART RATE: 56 BPM | TEMPERATURE: 98.6 F | BODY MASS INDEX: 29.4 KG/M2 | SYSTOLIC BLOOD PRESSURE: 115 MMHG | RESPIRATION RATE: 16 BRPM | DIASTOLIC BLOOD PRESSURE: 56 MMHG | OXYGEN SATURATION: 96 % | WEIGHT: 210 LBS

## 2022-06-23 LAB
ABO: NORMAL
ACTIVATED CLOTTING TIME: 207 SECONDS (ref 1–150)
ANION GAP SERPL CALCULATED.3IONS-SCNC: 9 MEQ/L (ref 8–16)
ANTIBODY SCREEN: NORMAL
BUN BLDV-MCNC: 13 MG/DL (ref 7–22)
CALCIUM SERPL-MCNC: 9 MG/DL (ref 8.5–10.5)
CHLORIDE BLD-SCNC: 102 MEQ/L (ref 98–111)
CHOLESTEROL, TOTAL: 175 MG/DL (ref 100–199)
CO2: 27 MEQ/L (ref 23–33)
CREAT SERPL-MCNC: 0.9 MG/DL (ref 0.4–1.2)
EKG ATRIAL RATE: 51 BPM
EKG ATRIAL RATE: 52 BPM
EKG P AXIS: 75 DEGREES
EKG P AXIS: 82 DEGREES
EKG P-R INTERVAL: 172 MS
EKG P-R INTERVAL: 180 MS
EKG Q-T INTERVAL: 436 MS
EKG Q-T INTERVAL: 446 MS
EKG QRS DURATION: 102 MS
EKG QRS DURATION: 98 MS
EKG QTC CALCULATION (BAZETT): 405 MS
EKG QTC CALCULATION (BAZETT): 411 MS
EKG R AXIS: 78 DEGREES
EKG R AXIS: 81 DEGREES
EKG T AXIS: 87 DEGREES
EKG T AXIS: 88 DEGREES
EKG VENTRICULAR RATE: 51 BPM
EKG VENTRICULAR RATE: 52 BPM
ERYTHROCYTE [DISTWIDTH] IN BLOOD BY AUTOMATED COUNT: 14 % (ref 11.5–14.5)
ERYTHROCYTE [DISTWIDTH] IN BLOOD BY AUTOMATED COUNT: 48.1 FL (ref 35–45)
GFR SERPL CREATININE-BSD FRML MDRD: 82 ML/MIN/1.73M2
GLUCOSE BLD-MCNC: 161 MG/DL (ref 70–108)
HCT VFR BLD CALC: 42.4 % (ref 42–52)
HDLC SERPL-MCNC: 26 MG/DL
HEMOGLOBIN: 14 GM/DL (ref 14–18)
INR BLD: 1.02 (ref 0.85–1.13)
LDL CHOLESTEROL CALCULATED: 92 MG/DL
MCH RBC QN AUTO: 31.3 PG (ref 26–33)
MCHC RBC AUTO-ENTMCNC: 33 GM/DL (ref 32.2–35.5)
MCV RBC AUTO: 94.6 FL (ref 80–94)
PLATELET # BLD: 170 THOU/MM3 (ref 130–400)
PMV BLD AUTO: 9.5 FL (ref 9.4–12.4)
POTASSIUM SERPL-SCNC: 3.8 MEQ/L (ref 3.5–5.2)
RBC # BLD: 4.48 MILL/MM3 (ref 4.7–6.1)
RH FACTOR: NORMAL
SODIUM BLD-SCNC: 138 MEQ/L (ref 135–145)
TRIGL SERPL-MCNC: 284 MG/DL (ref 0–199)
WBC # BLD: 6.1 THOU/MM3 (ref 4.8–10.8)

## 2022-06-23 PROCEDURE — 2500000003 HC RX 250 WO HCPCS

## 2022-06-23 PROCEDURE — 93458 L HRT ARTERY/VENTRICLE ANGIO: CPT

## 2022-06-23 PROCEDURE — 92928 PRQ TCAT PLMT NTRAC ST 1 LES: CPT | Performed by: INTERNAL MEDICINE

## 2022-06-23 PROCEDURE — C1769 GUIDE WIRE: HCPCS

## 2022-06-23 PROCEDURE — 99024 POSTOP FOLLOW-UP VISIT: CPT | Performed by: INTERNAL MEDICINE

## 2022-06-23 PROCEDURE — C1894 INTRO/SHEATH, NON-LASER: HCPCS

## 2022-06-23 PROCEDURE — 85610 PROTHROMBIN TIME: CPT

## 2022-06-23 PROCEDURE — 85027 COMPLETE CBC AUTOMATED: CPT

## 2022-06-23 PROCEDURE — C1874 STENT, COATED/COV W/DEL SYS: HCPCS

## 2022-06-23 PROCEDURE — 93010 ELECTROCARDIOGRAM REPORT: CPT | Performed by: NUCLEAR MEDICINE

## 2022-06-23 PROCEDURE — 2580000003 HC RX 258: Performed by: INTERNAL MEDICINE

## 2022-06-23 PROCEDURE — C1887 CATHETER, GUIDING: HCPCS

## 2022-06-23 PROCEDURE — 86901 BLOOD TYPING SEROLOGIC RH(D): CPT

## 2022-06-23 PROCEDURE — 93458 L HRT ARTERY/VENTRICLE ANGIO: CPT | Performed by: INTERNAL MEDICINE

## 2022-06-23 PROCEDURE — 99152 MOD SED SAME PHYS/QHP 5/>YRS: CPT | Performed by: INTERNAL MEDICINE

## 2022-06-23 PROCEDURE — 36415 COLL VENOUS BLD VENIPUNCTURE: CPT

## 2022-06-23 PROCEDURE — 93005 ELECTROCARDIOGRAM TRACING: CPT | Performed by: INTERNAL MEDICINE

## 2022-06-23 PROCEDURE — 80061 LIPID PANEL: CPT

## 2022-06-23 PROCEDURE — C1725 CATH, TRANSLUMIN NON-LASER: HCPCS

## 2022-06-23 PROCEDURE — 6360000002 HC RX W HCPCS

## 2022-06-23 PROCEDURE — 6360000004 HC RX CONTRAST MEDICATION: Performed by: INTERNAL MEDICINE

## 2022-06-23 PROCEDURE — 86850 RBC ANTIBODY SCREEN: CPT

## 2022-06-23 PROCEDURE — 86900 BLOOD TYPING SEROLOGIC ABO: CPT

## 2022-06-23 PROCEDURE — 85347 COAGULATION TIME ACTIVATED: CPT

## 2022-06-23 PROCEDURE — C9600 PERC DRUG-EL COR STENT SING: HCPCS

## 2022-06-23 PROCEDURE — 80048 BASIC METABOLIC PNL TOTAL CA: CPT

## 2022-06-23 PROCEDURE — 6370000000 HC RX 637 (ALT 250 FOR IP)

## 2022-06-23 RX ORDER — ACETAMINOPHEN 325 MG/1
650 TABLET ORAL EVERY 4 HOURS PRN
Status: DISCONTINUED | OUTPATIENT
Start: 2022-06-23 | End: 2022-06-24 | Stop reason: HOSPADM

## 2022-06-23 RX ORDER — BUDESONIDE AND FORMOTEROL FUMARATE DIHYDRATE 160; 4.5 UG/1; UG/1
2 AEROSOL RESPIRATORY (INHALATION) 2 TIMES DAILY
COMMUNITY

## 2022-06-23 RX ORDER — SODIUM CHLORIDE 9 MG/ML
INJECTION, SOLUTION INTRAVENOUS CONTINUOUS
Status: ACTIVE | OUTPATIENT
Start: 2022-06-23 | End: 2022-06-23

## 2022-06-23 RX ORDER — ONDANSETRON 2 MG/ML
4 INJECTION INTRAMUSCULAR; INTRAVENOUS EVERY 6 HOURS PRN
Status: DISCONTINUED | OUTPATIENT
Start: 2022-06-23 | End: 2022-06-24 | Stop reason: HOSPADM

## 2022-06-23 RX ORDER — ASPIRIN 325 MG
325 TABLET ORAL ONCE
Status: DISCONTINUED | OUTPATIENT
Start: 2022-06-23 | End: 2022-06-24 | Stop reason: HOSPADM

## 2022-06-23 RX ORDER — FLUTICASONE PROPIONATE AND SALMETEROL 250; 50 UG/1; UG/1
1 POWDER RESPIRATORY (INHALATION) DAILY
COMMUNITY

## 2022-06-23 RX ORDER — SODIUM CHLORIDE 9 MG/ML
INJECTION, SOLUTION INTRAVENOUS CONTINUOUS
Status: DISCONTINUED | OUTPATIENT
Start: 2022-06-23 | End: 2022-06-23

## 2022-06-23 RX ORDER — SODIUM CHLORIDE 0.9 % (FLUSH) 0.9 %
5-40 SYRINGE (ML) INJECTION PRN
Status: DISCONTINUED | OUTPATIENT
Start: 2022-06-23 | End: 2022-06-23 | Stop reason: SDUPTHER

## 2022-06-23 RX ORDER — SODIUM CHLORIDE 0.9 % (FLUSH) 0.9 %
5-40 SYRINGE (ML) INJECTION PRN
Status: DISCONTINUED | OUTPATIENT
Start: 2022-06-23 | End: 2022-06-24 | Stop reason: HOSPADM

## 2022-06-23 RX ORDER — ATROPINE SULFATE 0.4 MG/ML
0.5 AMPUL (ML) INJECTION
Status: DISCONTINUED | OUTPATIENT
Start: 2022-06-23 | End: 2022-06-23 | Stop reason: HOSPADM

## 2022-06-23 RX ORDER — MULTIVIT WITH MINERALS/LUTEIN
1000 TABLET ORAL DAILY
COMMUNITY

## 2022-06-23 RX ORDER — SODIUM CHLORIDE 9 MG/ML
INJECTION, SOLUTION INTRAVENOUS PRN
Status: DISCONTINUED | OUTPATIENT
Start: 2022-06-23 | End: 2022-06-24 | Stop reason: HOSPADM

## 2022-06-23 RX ORDER — SODIUM CHLORIDE 0.9 % (FLUSH) 0.9 %
5-40 SYRINGE (ML) INJECTION EVERY 12 HOURS SCHEDULED
Status: DISCONTINUED | OUTPATIENT
Start: 2022-06-23 | End: 2022-06-24 | Stop reason: HOSPADM

## 2022-06-23 RX ORDER — ASPIRIN 325 MG
325 TABLET ORAL DAILY
Status: DISCONTINUED | OUTPATIENT
Start: 2022-06-24 | End: 2022-06-24 | Stop reason: HOSPADM

## 2022-06-23 RX ADMIN — IOPAMIDOL 110 ML: 755 INJECTION, SOLUTION INTRAVENOUS at 08:58

## 2022-06-23 RX ADMIN — SODIUM CHLORIDE: 9 INJECTION, SOLUTION INTRAVENOUS at 06:53

## 2022-06-23 ASSESSMENT — PAIN SCALES - GENERAL: PAINLEVEL_OUTOF10: 0

## 2022-06-23 NOTE — PROCEDURES
[address] [place] [address]                            CARDIAC CATHETERIZATION    PATIENT NAME: Ashley Vidal                   :        1945  MED REC NO:   [id number]                           ROOM:       0015  ACCOUNT NO:   [de-identified]                           ADMIT DATE: 2022  PROVIDER:     Osman Morrissey. Axel Feldman M.D.    Yobany Duel:  2022    CATH REPORT/INTERVENTIONAL PROCEDURE    BRIEF HISTORY:  This is a patient who is a 61-year-old gentleman with  known history of coronary artery disease, congestive heart failure, and  arrhythmia. The patient had been complaining of progressive angina  pectoris. He was treated medically, continued to be symptomatic, and  admitted for a heart cath. He did have a stress Cardiolite test, which  was abnormal.  He had a history of diabetes mellitus,  hypercholesterolemia, and obesity. The patient has angina pectoris Class III. DESCRIPTION OF PROCEDURE:  1.  IV conscious sedation:  The patient was given IV conscious sedation  in incremental dosage by the circulating cath lab RN, monitored by the  cath lab monitor tech under my supervision. The procedure was started  at 08:15 and finished at 08:45. No acute complication from the  procedure. 2.  Left heart cath: The right radial artery was cannulated with a  6-Mongolian sheath. Heparin, nitroglycerin, and verapamil were given  through the sheath per protocol. Estimated blood loss about 15 mL. The coronary angiogram showed the RCA is a dominant artery. The stented  area of the RCA is still patent with about 50% narrowing of the mid RCA,  diffuse disease of the PDA and posterolateral not severe enough for  intervention, nonobstructive disease. Proximal RCA is stable. Left main is stable.   Bifurcates to the LAD and circumflex, diffuse  nonobstructive disease of the LAD was noticed, stented area of the LAD  was patent, about 50% narrowing of the ostium of the high diagonal  artery, 50% to 60% narrowing of the ostium of the circumflex artery,  stable, subtotal stenosis of the proximal circumflex, a new lesion,  distally circumflex was patent. The left ventriculogram showed mild  diffuse hypokinesia of the left ventricle. Ejection fraction was about  40%. No mitral regurgitation. No gradient across the aortic valve. 3.  Intervention of the circumflex: The left main was cannulated with  the 6 x 3.5 left Voda guide catheter. A BMW wire was placed in the  distal end of the circumflex. Heparin was given. The area of the  proximal circumflex predilated utilizing a 3.0 x 10 cm cutting balloon. The balloon was inflated up to 12 atmospheric pressure to 1 minute. The  balloon was then removed. The area was stented utilizing a 3.0 x 12 mm  Resolute drug-eluting stent, deployed at 18 atmospheric pressure for 25  seconds, increasing the effective size of the stent to 3.3 mm. There  was reduction of stenosis at 0% FRANCOIS-3 flow. The patient has no acute  complications from the procedure. IMPRESSION:  1. Mild diffuse hypokinesia of the left ventricle and ejection fraction  was about 40%. 2.  No mitral regurgitation. No gradient across the aortic valve. 3.  Dominant RCA, nonobstructive disease of the proximal portion of the  RCA. Moderate disease of the mid LAD is stable. Stented area of the  RCA is still patent. PDA and posterolateral branch of the RCA has  diffuse nonobstructive disease. 4.  Patent left main. 5.  50% to 60% ostial narrowing of the circumflex artery is stable, to  be followed by another area of 90% stenosis. Distally, the circumflex  was patent. 6.  Diffuse nonobstructive disease of the LAD. 7.  50% narrowing of the ostium of the moderate-sized diagonal artery. 8.  Successful cutting balloon stenting of the proximal circumflex  reducing the stenosis from 90% to 0% FRANCOIS-3 flow.   9.  The patient has no acute complications from the procedure. RECOMMENDATIONS:  Recommend aggressive medical treatment, risk factor  modification, periodic followup, dual antiplatelet treatments for  minimum of one year, cardiac rehab, and risk factor modification. Tano Allen M.D.    D: 06/23/2022 9:09:48       T: 06/23/2022 10:30:51     [email or web address]  Job#: [id number]     Doc#: [id number]    CC:  Adel M. Laddie Lennox, M.D.

## 2022-06-23 NOTE — PROGRESS NOTES
Discharge teaching and instructions completed with patient and wife using teachback method. AVS reviewed. No new prescriptions. Patient and wife voiced understanding regardingmedications, follow up appointments, and care of self at home. Discharged in a wheelchair to home with support per wife and son.

## 2022-06-23 NOTE — H&P
[address] [address]   Sedation/Analgesia History and Physical    Pt Name: Olman Tristan  MRN: [id number]  YOB: 1945  Provider Performing Procedure: Chantell Burgos MD, MD  Primary Care Physician: Hank Reeves DO      Pre-Procedure: Chest pain, possible coronary artery disease, abnormal stress test    Consent: I have discussed with the patient and/or the patient representative the indication, alternatives, and the possible risks and/or complications of the planned procedure and the anesthesia methods. The patient and/or representative appear to understand and agree to proceed. Medical History:   has a past medical history of CAD (coronary artery disease), Cancer ([facility] COPD (chronic obstructive pulmonary disease) ([facility] Diabetes mellitus ([facility] Hyperlipidemia, Hypertension, Prolonged emergence from general anesthesia, Sleep apnea, and Wheeze. .    Surgical History:     has a past surgical history that includes Skin cancer excision; other surgical history; Tonsillectomy; Dental surgery; Knee Arthroplasty (Bilateral); back surgery; Cardiac surgery; pr shldr arthroscop,surg,w/rotat cuff repr (Right, 8/24/2018); Colonoscopy (N/A, 8/14/2019); Upper gastrointestinal endoscopy (N/A, 8/14/2019); Colonoscopy (8/14/2019); and joint replacement (Bilateral). Allergies: Allergies as of 06/23/2022 - Fully Reviewed 06/23/2022   Allergen Reaction Noted    Bee venom Swelling 07/24/2017       Medications:   Coumadin use last 5 days:  No  Antiplatelet drug therapy use last 5 days:  Yes  Other anticoagulant use last 5 days:  No   aspirin  325 mg Oral Once     Prior to Admission medications    Medication Sig Start Date End Date Taking?  Authorizing Provider   fluticasone-salmeterol (WIXELA INHUB) 250-50 MCG/ACT AEPB diskus inhaler Inhale 1 puff into the lungs daily   Yes Historical Provider, MD   budesonide-formoterol (SYMBICORT) 160-4.5 MCG/ACT AERO Inhale 2 puffs into the lungs 2 times daily   Yes Historical Provider, MD   Cholecalciferol (VITAMIN D3) 125 MCG (5000 UT) TABS Take by mouth daily   Yes Historical Provider, MD   Ascorbic Acid (VITAMIN C) 1000 MG tablet Take 1,000 mg by mouth daily   Yes Historical Provider, MD   ZINC PO Take 50 mg by mouth   Yes Historical Provider, MD   polyethyl glycol-propyl glycol 0.4-0.3 % (SYSTANE) 0.4-0.3 % ophthalmic solution 1 drop as needed for Dry Eyes    Historical Provider, MD   empagliflozin (JARDIANCE) 10 MG tablet Take 1 tablet by mouth daily 5/24/22   Marcy Marcum MD   clopidogrel (PLAVIX) 75 MG tablet Take 1 tablet by mouth daily 5/24/22   Jennifer Leger MD   sacubitril-valsartan (ENTRESTO) 49-51 MG per tablet Take 1 tablet by mouth 2 times daily 5/24/22   Dianaajejuanito Brill Jolene Marcum MD   atorvastatin (LIPITOR) 80 MG tablet Take 1 tablet by mouth daily 5/24/22   Jennifer Leger MD   carvedilol (COREG) 25 MG tablet Take 1 tablet by mouth daily 5/24/22   Marcy Marcum MD   nitroGLYCERIN (NITROSTAT) 0.4 MG SL tablet Place 1 tablet under the tongue every 5 minutes as needed for Chest pain up to max of 3 total doses. If no relief after 1 dose, call 911. 5/24/22   Jennifer Leger MD   PANTOPRAZOLE SODIUM PO Take 40 mg by mouth daily     Historical Provider, MD   ezetimibe (ZETIA) 10 MG tablet Take 10 mg by mouth daily    Historical Provider, MD   Omega-3 Fatty Acids (FISH OIL) 1000 MG CAPS Take 1,000 mg by mouth daily     Historical Provider, MD   bumetanide (BUMEX) 2 MG tablet Take 1 tablet by mouth daily 7/18/19   Jennifer Leger MD   docusate sodium (COLACE) 100 MG capsule Take 100 mg by mouth daily as needed     Historical Provider, MD   gabapentin (NEURONTIN) 600 MG tablet Take 600 mg by mouth 3 times daily    Historical Provider, MD   naproxen (NAPROSYN) 500 MG tablet Take 500 mg by mouth as needed     Historical Provider, MD   metFORMIN (GLUCOPHAGE) 1000 MG tablet Take 1,000 mg by mouth 2 times daily (with meals).     Historical Provider, MD   aspirin 81 MG tablet Take 81 mg by mouth daily. Historical Provider, MD   PARoxetine (PAXIL) 30 MG tablet Take 60 mg by mouth every morning     Historical Provider, MD   Albuterol Sulfate (PROVENTIL HFA IN) Inhale 2 puffs into the lungs 4 times daily as needed     Historical Provider, MD       Vital Signs  Vitals:    06/23/22 0645   BP: 131/67   Pulse: 50   Resp: 16   Temp: 98.6 °F (37 °C)   SpO2: 99%       Physical:  Heart:  regular rate and rhythm  Lungs:  Clear  Abdomen:  Soft  Mental Status:  Alert and Oriented    Planned Procedure:  Left Heart Cath and Possible Percutaneous Coronary Intervention    Sedation/ Anesthesia Plan: Midazolam and Sublimaze    ASA Classification: Class 3 - A patient with severe systemic disease that limits activity but is not incapacitating    Mallampati Airway Classification: II (soft palate, uvula, fauces visible)    · Pre-procedure diagnostic studies complete and results available. · Previous sedation/anesthesia experiences assessed. · The patient is an appropriate candidate to undergo the planned procedure sedation and anesthesia. (Refer to nursing sedation/analgesia documentation record)  · Formulation and discussion of sedation/procedure plan, risks, and expectations with patient and/or responsible adult completed. · Patient examined immediately prior to the procedure.  (Refer to nursing sedation/analgesia documentation record)    Ava Bowser MD, MD  Electronically signed 6/23/2022 at 8:01 AM  Patient Name: Karen Go Record Number: [id number]  Date: 6/23/2022   Time: 8:01 AM   Room/Bed: 2E-15/015-A

## 2022-06-23 NOTE — OP NOTE
Operative Note      Patient: Olman Tristan  YOB: 1945  MRN: [id number]    Date of Procedure:     Pre-Op Diagnosis: * No surgery found *                   [facility] Sedation Record      Pt Name: Olman Tristan  MRN: [id number]  YOB: 1945  Procedure Performed By: Chantell Burgos MD, MD  Primary Care Physician: Hank Reeves DO    POST-PROCEDURE    Physician: Chantell Burgos MD, MD    Procedure Performed:  Left Heart Catheterization, Possible Percutaneous Coronary Intervention and Left Heart Cath    Sedation/Anesthesia:  Local Anesthesia and IV Conscious Sedation with continuous O2 monitoring    Estimated Blood Loss:  Minimal    Specimens Removed:  None    Complications:  None     Post Procedure Diagnosis/Findings:  Coronary Artery Disease    Recommendations:  Medical treatment and review films. Chantell Burgos MD MD  Electronically signed 6/23/2022 at 8:55 AM                                     [facility]  Sedation/Analgesia Post Sedation Record      Pt Name: Olman Tristan  MRN: [id number]  YOB: 1945  Procedure Performed By: Chantell Burgos MD, MD  Primary Care Physician: Hank Reeves DO    POST-PROCEDURE    Physician: Chantell Burgos MD, MD    Procedure Performed:  Possible Percutaneous Coronary Intervention and Left Heart Cath    Sedation/Anesthesia:  Local Anesthesia and IV Conscious Sedation with continuous O2 monitoring    Estimated Blood Loss:  Minimal    Specimens Removed:  None    Complications:  None     Post Procedure Diagnosis/Findings:  Coronary Artery Disease    Recommendations:  Medical treatment and review films.        Chantell Burgos MD, MD  Electronically signed 6/23/2022 at 8:56 AM

## 2022-06-23 NOTE — FLOWSHEET NOTE
Received in Cath Recovery post procedure. Report received from Cath Lab. Vasc band intact over right radial artery. No signs of bleeding or swelling at site. Hemostasis maintained. Armboard in place. See Post Cath Assessment flowsheet for assessments and vital signs. Heart monitor showing sinus bradycardia. IV 1000 0.9 NS infusing at 100 ml per hour in left forearm.   Dr Dorina An in to see pt, wife and son

## 2022-06-23 NOTE — PROGRESS NOTES
Pt admitted to  2E15 ambulatory for left heart cath. Pt NPO. Patient accompanied by spouse, Bethany Zamora. Vital signs obtained. Assessment and data collection initiated. Oriented to room. Policies and procedures for 2E explained   All questions answered with no further questions at this time. Fall prevention and safety precautions discussed with patient. Care plan reviewed with patient and spouse. Patient and spouse verbalize understanding of the plan of care and contribute to goal setting.     0810 to procedure per bed

## 2022-06-23 NOTE — PROGRESS NOTES
Inpatient Cardiac Rehabilitation Consult    Received consult for Phase II Cardiac Rehabilitation. Patient needs cardiac rehab due to PCI on 6/23/22. Attempted to see patient for cardiac rehab education, patient currently sleeping. Will re attempt at a later time. UPDATE 1258: Importance of Cardiac Rehab discussed with patient. Reviewed cardiac rehab class times. Patient questions answered. Patient wants to think about it. We will contact patient at home to schedule evaluation appointment. Cardiac Rehab brochure given.

## 2022-07-14 ENCOUNTER — OFFICE VISIT (OUTPATIENT)
Dept: CARDIOLOGY CLINIC | Age: 77
End: 2022-07-14
Payer: MEDICARE

## 2022-07-14 VITALS
WEIGHT: 203 LBS | RESPIRATION RATE: 16 BRPM | BODY MASS INDEX: 28.31 KG/M2 | DIASTOLIC BLOOD PRESSURE: 70 MMHG | SYSTOLIC BLOOD PRESSURE: 102 MMHG

## 2022-07-14 DIAGNOSIS — R06.02 SOB (SHORTNESS OF BREATH) ON EXERTION: Primary | ICD-10-CM

## 2022-07-14 DIAGNOSIS — E78.5 DYSLIPIDEMIA (HIGH LDL; LOW HDL): ICD-10-CM

## 2022-07-14 PROCEDURE — 1123F ACP DISCUSS/DSCN MKR DOCD: CPT | Performed by: INTERNAL MEDICINE

## 2022-07-14 PROCEDURE — 93000 ELECTROCARDIOGRAM COMPLETE: CPT | Performed by: INTERNAL MEDICINE

## 2022-07-14 PROCEDURE — 99213 OFFICE O/P EST LOW 20 MIN: CPT | Performed by: INTERNAL MEDICINE

## 2022-07-14 ASSESSMENT — ENCOUNTER SYMPTOMS
ABDOMINAL PAIN: 0
SHORTNESS OF BREATH: 1
ANAL BLEEDING: 0
BLOOD IN STOOL: 0
CHOKING: 0
STRIDOR: 0
VOICE CHANGE: 0
ABDOMINAL DISTENTION: 0
COUGH: 0
VOMITING: 0
CHEST TIGHTNESS: 0
APNEA: 0
NAUSEA: 0
WHEEZING: 0
TROUBLE SWALLOWING: 0
COLOR CHANGE: 0

## 2022-07-14 NOTE — PROGRESS NOTES
[address] [facility] [address]  Dept: [address]  Loc: [phone]     7/14/2022       Bony Coronel is here today for No chief complaint on file. Referring Physician:  No ref. provider found     Patient Active Problem List   Diagnosis    Spinal stenosis, lumbar region, with neurogenic claudication    Metabolic acidosis    Leukocytosis    Normocytic anemia    DM (diabetes mellitus), type 2, uncontrolled ([facility]    HTN (hypertension)    MALIKA (obstructive sleep apnea)    HLD (hyperlipidemia)    Asthma    Depression    Constipation    H/O class III angina pectoris    Ventricular arrhythmia    Paroxysmal atrial fibrillation (HCC)    Obesity due to excess calories    CAD in native artery    Angina pectoris associated with type 2 diabetes mellitus (HCC)    Abnormal nuclear stress test       Review of Systems   Constitutional: Negative for activity change, appetite change, fatigue, fever and unexpected weight change. HENT: Negative for congestion, trouble swallowing and voice change. Eyes: Negative for visual disturbance. Respiratory: Positive for shortness of breath. Negative for apnea, cough, choking, chest tightness, wheezing and stridor. Cardiovascular: Negative for chest pain, palpitations and leg swelling. Gastrointestinal: Negative for abdominal distention, abdominal pain, anal bleeding, blood in stool, nausea and vomiting. Endocrine: Negative for cold intolerance and heat intolerance. Genitourinary: Negative for hematuria. Musculoskeletal: Negative for arthralgias, gait problem, joint swelling and myalgias. Skin: Negative for color change and rash. Allergic/Immunologic: Negative for environmental allergies and food allergies. Neurological: Negative for dizziness, tremors, syncope, facial asymmetry, weakness, light-headedness, numbness and headaches. Hematological: Does not bruise/bleed easily. Psychiatric/Behavioral: Negative for agitation, behavioral problems and sleep disturbance. Past Medical History:   Diagnosis Date    CAD (coronary artery disease)     Cancer ([facility]     skin basal cell    COPD (chronic obstructive pulmonary disease) ([facility]     Diabetes mellitus ([facility]     Hyperlipidemia     Hypertension     Prolonged emergence from general anesthesia     had apnea with surgery    Sleep apnea     has CPAP    Wheeze        Allergies   Allergen Reactions    Bee Venom Swelling       Current Outpatient Medications   Medication Sig Dispense Refill    fluticasone-salmeterol (WIXELA INHUB) 250-50 MCG/ACT AEPB diskus inhaler Inhale 1 puff into the lungs daily      budesonide-formoterol (SYMBICORT) 160-4.5 MCG/ACT AERO Inhale 2 puffs into the lungs 2 times daily      Cholecalciferol (VITAMIN D3) 125 MCG (5000 UT) TABS Take by mouth daily      Ascorbic Acid (VITAMIN C) 1000 MG tablet Take 1,000 mg by mouth daily      ZINC PO Take 50 mg by mouth      polyethyl glycol-propyl glycol 0.4-0.3 % (SYSTANE) 0.4-0.3 % ophthalmic solution 1 drop as needed for Dry Eyes      empagliflozin (JARDIANCE) 10 MG tablet Take 1 tablet by mouth daily 90 tablet 3    clopidogrel (PLAVIX) 75 MG tablet Take 1 tablet by mouth daily 90 tablet 3    sacubitril-valsartan (ENTRESTO) 49-51 MG per tablet Take 1 tablet by mouth 2 times daily 180 tablet 3    atorvastatin (LIPITOR) 80 MG tablet Take 1 tablet by mouth daily 90 tablet 3    carvedilol (COREG) 25 MG tablet Take 1 tablet by mouth daily 90 tablet 3    nitroGLYCERIN (NITROSTAT) 0.4 MG SL tablet Place 1 tablet under the tongue every 5 minutes as needed for Chest pain up to max of 3 total doses.  If no relief after 1 dose, call 911. 25 tablet 3    PANTOPRAZOLE SODIUM PO Take 40 mg by mouth daily       ezetimibe (ZETIA) 10 MG tablet Take 10 mg by mouth daily      Omega-3 Fatty Acids (FISH OIL) 1000 MG CAPS Take 1,000 mg by mouth daily       bumetanide (BUMEX) 2 MG tablet Take 1 tablet by mouth daily 30 tablet 3    docusate sodium (COLACE) 100 MG capsule Take 100 mg by mouth daily as needed       gabapentin (NEURONTIN) 600 MG tablet Take 600 mg by mouth 3 times daily      naproxen (NAPROSYN) 500 MG tablet Take 500 mg by mouth as needed       metFORMIN (GLUCOPHAGE) 1000 MG tablet Take 1,000 mg by mouth 2 times daily (with meals).  aspirin 81 MG tablet Take 81 mg by mouth daily.  PARoxetine (PAXIL) 30 MG tablet Take 60 mg by mouth every morning       Albuterol Sulfate (PROVENTIL HFA IN) Inhale 2 puffs into the lungs 4 times daily as needed        No current facility-administered medications for this visit. Social History     Socioeconomic History    Marital status:      Spouse name: leo    Number of children: None    Years of education: None    Highest education level: None   Occupational History    None   Tobacco Use    Smoking status: Former Smoker     Quit date: 1976     Years since quittin.11    Smokeless tobacco: Former User   Vaping Use    Vaping Use: Never used   Substance and Sexual Activity    Alcohol use: Yes     Comment: rare     Drug use: Yes     Types: Marijuana Cb Anna     Comment: social    Sexual activity: Not Currently   Other Topics Concern    None   Social History Narrative    None     Social Determinants of Health     Financial Resource Strain:     Difficulty of Paying Living Expenses: Not on file   Food Insecurity:     Worried About Running Out of Food in the Last Year: Not on file    Nikolai of Food in the Last Year: Not on file   Transportation Needs:     Lack of Transportation (Medical): Not on file    Lack of Transportation (Non-Medical):  Not on file   Physical Activity:     Days of Exercise per Week: Not on file    Minutes of Exercise per Session: Not on file   Stress:     Feeling of Stress : Not on file   Social Connections:     Frequency oriented, normal speech, no focal findings or movement disorder noted    Lab Data:    Cardiac Enzymes:  No results for input(s): CKTOTAL, CKMB, CKMBINDEX, TROPONINI in the last 72 hours. CBC:   Lab Results   Component Value Date/Time    WBC 6.1 06/23/2022 06:17 AM    RBC 4.48 06/23/2022 06:17 AM    HGB 14.0 06/23/2022 06:17 AM    HCT 42.4 06/23/2022 06:17 AM     06/23/2022 06:17 AM       CMP:    Lab Results   Component Value Date/Time     06/23/2022 06:17 AM    K 3.8 06/23/2022 06:17 AM    K 4.6 10/07/2021 01:05 PM     06/23/2022 06:17 AM    CO2 27 06/23/2022 06:17 AM    BUN 13 06/23/2022 06:17 AM    CREATININE 0.9 06/23/2022 06:17 AM    LABGLOM 82 06/23/2022 06:17 AM    GLUCOSE 161 06/23/2022 06:17 AM    CALCIUM 9.0 06/23/2022 06:17 AM       Hepatic Function Panel:    Lab Results   Component Value Date/Time    ALKPHOS 72 10/07/2021 01:05 PM    ALT 28 10/07/2021 01:05 PM    AST 18 10/07/2021 01:05 PM    PROT 6.8 10/07/2021 01:05 PM    BILITOT 0.4 10/07/2021 01:05 PM    BILIDIR <0.2 07/25/2017 04:46 AM    LABALBU 4.4 10/07/2021 01:05 PM       Magnesium:    Lab Results   Component Value Date/Time    MG 1.8 07/26/2017 03:33 AM       PT/INR:    Lab Results   Component Value Date/Time    INR 1.02 06/23/2022 06:17 AM       HgBA1c:    Lab Results   Component Value Date/Time    LABA1C 7.2 03/11/2015 06:55 PM       FLP:    Lab Results   Component Value Date/Time    TRIG 284 06/23/2022 06:17 AM    HDL 26 06/23/2022 06:17 AM    LDLCALC 92 06/23/2022 06:17 AM       TSH:  No results found for: TSH     Diagnosis Orders   1. SOB (shortness of breath) on exertion  77156 - [place] ELECTROCARDIOGRAM, COMPLETE    CBC    Basic Metabolic Panel    Lipid Panel    Hepatic Function Panel   2.  Dyslipidemia (high LDL; low HDL)  CBC    Basic Metabolic Panel    Lipid Panel    Hepatic Function Panel        Assessment/Plan    Meghan Flor is a 69years old gentleman with a history of extensive atherosclerotic coronary artery disease recently he had a heart cath and intervention of the RCA he is here for a follow-up he indicates he is feeling much better he was encouraged to be involved in a cardiac rehab he has multiple risk factor include hypercholesterolemia and the left ventricle dysfunction he has been on Jardiance and he has been on the [place]. This patient has a history of congestive heart failure is a chronic systolic congestive heart failure and he has been on the diuretics he he has a history of arrhythmia but he has no recurrence of his palpitation since he had the stent. His EKG showed sinus rhythm and no acute changes patient has history of hypercholesterolemia and he has been on the statin he had the diabetes mellitus has been metformin and this helped to control his symptoms greatly. His triglyceride is elevated probably related to his diabetes the patient was advised about diet exercise the risk factor modification the patient will be seen in 6 months to involved in a cardiac and he will be seen in 6 months with him on a cardiac rehab seek medical attention if with any change in clinical condition thank    Orders Placed This Encounter   Procedures    CBC     Standing Status:   Future     Standing Expiration Date:   7/14/2023    Basic Metabolic Panel     Standing Status:   Future     Standing Expiration Date:   7/14/2023    Lipid Panel     Standing Status:   Future     Standing Expiration Date:   7/14/2023     Order Specific Question:   Is Patient Fasting?/# of Hours     Answer:   12 hours    Hepatic Function Panel     Standing Status:   Future     Standing Expiration Date:   7/14/2023    95609 - [place] ELECTROCARDIOGRAM, COMPLETE       Return in about 6 months (around 1/14/2023) for cad.      Khushbu Ruth MD

## 2022-12-21 ENCOUNTER — OFFICE VISIT (OUTPATIENT)
Dept: CARDIOLOGY CLINIC | Age: 77
End: 2022-12-21
Payer: MEDICARE

## 2022-12-21 VITALS
RESPIRATION RATE: 20 BRPM | SYSTOLIC BLOOD PRESSURE: 113 MMHG | HEIGHT: 71 IN | BODY MASS INDEX: 27.5 KG/M2 | WEIGHT: 196.4 LBS | DIASTOLIC BLOOD PRESSURE: 62 MMHG | HEART RATE: 68 BPM

## 2022-12-21 DIAGNOSIS — E78.5 DYSLIPIDEMIA (HIGH LDL; LOW HDL): ICD-10-CM

## 2022-12-21 DIAGNOSIS — I10 HYPERTENSION, UNSPECIFIED TYPE: Primary | ICD-10-CM

## 2022-12-21 PROCEDURE — 93000 ELECTROCARDIOGRAM COMPLETE: CPT | Performed by: INTERNAL MEDICINE

## 2022-12-21 PROCEDURE — 1123F ACP DISCUSS/DSCN MKR DOCD: CPT | Performed by: INTERNAL MEDICINE

## 2022-12-21 PROCEDURE — 3078F DIAST BP <80 MM HG: CPT | Performed by: INTERNAL MEDICINE

## 2022-12-21 PROCEDURE — 3074F SYST BP LT 130 MM HG: CPT | Performed by: INTERNAL MEDICINE

## 2022-12-21 PROCEDURE — 99214 OFFICE O/P EST MOD 30 MIN: CPT | Performed by: INTERNAL MEDICINE

## 2022-12-21 RX ORDER — NITROGLYCERIN 0.4 MG/1
0.4 TABLET SUBLINGUAL EVERY 5 MIN PRN
Qty: 25 TABLET | Refills: 3 | Status: SHIPPED | OUTPATIENT
Start: 2022-12-21

## 2022-12-21 RX ORDER — CLOPIDOGREL BISULFATE 75 MG/1
75 TABLET ORAL DAILY
Qty: 90 TABLET | Refills: 3 | Status: SHIPPED | OUTPATIENT
Start: 2022-12-21

## 2022-12-21 RX ORDER — BUMETANIDE 2 MG/1
2 TABLET ORAL DAILY
Qty: 90 TABLET | Refills: 3 | Status: SHIPPED | OUTPATIENT
Start: 2022-12-21

## 2022-12-21 RX ORDER — CARVEDILOL 25 MG/1
25 TABLET ORAL DAILY
Qty: 90 TABLET | Refills: 3 | Status: SHIPPED | OUTPATIENT
Start: 2022-12-21

## 2022-12-21 RX ORDER — EZETIMIBE 10 MG/1
10 TABLET ORAL DAILY
Qty: 90 TABLET | Refills: 3 | Status: SHIPPED | OUTPATIENT
Start: 2022-12-21

## 2022-12-21 ASSESSMENT — ENCOUNTER SYMPTOMS
COLOR CHANGE: 0
CHOKING: 0
SHORTNESS OF BREATH: 0
TROUBLE SWALLOWING: 0
BLOOD IN STOOL: 0
WHEEZING: 0
COUGH: 0
ABDOMINAL DISTENTION: 0
VOICE CHANGE: 0
STRIDOR: 0
NAUSEA: 0
ANAL BLEEDING: 0
APNEA: 0
CHEST TIGHTNESS: 0
VOMITING: 0
ABDOMINAL PAIN: 0

## 2022-12-21 NOTE — PROGRESS NOTES
headaches. Hematological:  Does not bruise/bleed easily. Psychiatric/Behavioral:  Negative for agitation, behavioral problems and sleep disturbance. Past Medical History:   Diagnosis Date    CAD (coronary artery disease)     Cancer ([address]     skin basal cell    COPD (chronic obstructive pulmonary disease) (HCC)     Diabetes mellitus (HCC)     Hyperlipidemia     Hypertension     Prolonged emergence from general anesthesia     had apnea with surgery    Sleep apnea     has CPAP    Wheeze        Allergies   Allergen Reactions    Bee Venom Swelling       Current Outpatient Medications   Medication Sig Dispense Refill    clopidogrel (PLAVIX) 75 MG tablet Take 1 tablet by mouth daily 90 tablet 3    carvedilol (COREG) 25 MG tablet Take 1 tablet by mouth daily 90 tablet 3    bumetanide (BUMEX) 2 MG tablet Take 1 tablet by mouth daily 90 tablet 3    empagliflozin (JARDIANCE) 10 MG tablet Take 1 tablet by mouth daily 90 tablet 3    ezetimibe (ZETIA) 10 MG tablet Take 1 tablet by mouth daily 90 tablet 3    nitroGLYCERIN (NITROSTAT) 0.4 MG SL tablet Place 1 tablet under the tongue every 5 minutes as needed for Chest pain up to max of 3 total doses.  If no relief after 1 dose, call 911. 25 tablet 3    sacubitril-valsartan (ENTRESTO) 49-51 MG per tablet Take 1 tablet by mouth 2 times daily 180 tablet 3    fluticasone-salmeterol (ADVAIR) 250-50 MCG/ACT AEPB diskus inhaler Inhale 1 puff into the lungs daily      ZINC PO Take 50 mg by mouth      polyethyl glycol-propyl glycol 0.4-0.3 % (SYSTANE) 0.4-0.3 % ophthalmic solution 1 drop as needed for Dry Eyes      atorvastatin (LIPITOR) 80 MG tablet Take 1 tablet by mouth daily 90 tablet 3    PANTOPRAZOLE SODIUM PO Take 40 mg by mouth daily       Omega-3 Fatty Acids (FISH OIL) 1000 MG CAPS Take 1,000 mg by mouth daily       gabapentin (NEURONTIN) 600 MG tablet Take 600 mg by mouth 3 times daily      naproxen (NAPROSYN) 500 MG tablet Take 500 mg by mouth as needed       metFORMIN (GLUCOPHAGE) 1000 MG tablet Take 1,000 mg by mouth 2 times daily (with meals). PARoxetine (PAXIL) 30 MG tablet Take 60 mg by mouth every morning       budesonide-formoterol (SYMBICORT) 160-4.5 MCG/ACT AERO Inhale 2 puffs into the lungs 2 times daily      Cholecalciferol (VITAMIN D3) 125 MCG (5000 UT) TABS Take by mouth daily      Ascorbic Acid (VITAMIN C) 1000 MG tablet Take 1,000 mg by mouth daily      docusate sodium (COLACE) 100 MG capsule Take 100 mg by mouth daily as needed  (Patient not taking: Reported on 2022)      aspirin 81 MG tablet Take 81 mg by mouth daily. Albuterol Sulfate (PROVENTIL HFA IN) Inhale 2 puffs into the lungs 4 times daily as needed        No current facility-administered medications for this visit. Social History     Socioeconomic History    Marital status:      Spouse name: leo    Number of children: None    Years of education: None    Highest education level: None   Tobacco Use    Smoking status: Former     Types: Cigarettes     Quit date: 1976     Years since quittin.0    Smokeless tobacco: Former   Vaping Use    Vaping Use: Never used   Substance and Sexual Activity    Alcohol use: Yes     Comment: rare     Drug use: Yes     Types: Marijuana (Weed)     Comment: social    Sexual activity: Not Currently       Family History   Problem Relation Age of Onset    Cancer Father     Emphysema Mother     Other Maternal Grandmother         killed in MVA    Stroke Maternal Grandfather     Kidney Disease Paternal Grandmother     Cancer Paternal Uncle     Heart Disease Paternal Uncle     Cancer Paternal Uncle     Hearing Loss Brother     Diabetes Neg Hx     High Blood Pressure Neg Hx        Blood pressure 113/62, pulse 68, resp. rate 20, height 5' 11\" (1.803 m), weight 196 lb 6.4 oz (89.1 kg).     Physical Exam:    General Appearance: alert and oriented to person, place and time, in no acute distress  Cardiovascular: normal rate, regular rhythm, normal S1 and S2, no murmurs, rubs, clicks, or gallops, distal pulses intact, no carotid bruits, no JVD  Pulmonary/Chest: clear to auscultation bilaterally- no wheezes, rales or rhonchi, normal air movement, no respiratory distress  Abdomen: soft, non-tender, non-distended, normal bowel sounds, no masses   Extremities: no cyanosis, clubbing or edema, pulse   Skin: warm and dry, no rash or erythema  Head: normocephalic and atraumatic  Eyes: pupils equal, round, and reactive to light  Neck: supple and non-tender without mass, no thyromegaly   Musculoskeletal: normal range of motion, no joint swelling, deformity or tenderness  Neurological: alert, oriented, normal speech, no focal findings or movement disorder noted    Lab Data:    Cardiac Enzymes:  No results for input(s): CKTOTAL, CKMB, CKMBINDEX, TROPONINI in the last 72 hours.     CBC:   Lab Results   Component Value Date/Time    WBC 6.1 06/23/2022 06:17 AM    RBC 4.48 06/23/2022 06:17 AM    HGB 14.0 06/23/2022 06:17 AM    HCT 42.4 06/23/2022 06:17 AM     06/23/2022 06:17 AM       CMP:    Lab Results   Component Value Date/Time     06/23/2022 06:17 AM    K 3.8 06/23/2022 06:17 AM    K 4.6 10/07/2021 01:05 PM     06/23/2022 06:17 AM    CO2 27 06/23/2022 06:17 AM    BUN 13 06/23/2022 06:17 AM    CREATININE 0.9 06/23/2022 06:17 AM    LABGLOM 82 06/23/2022 06:17 AM    GLUCOSE 161 06/23/2022 06:17 AM    CALCIUM 9.0 06/23/2022 06:17 AM       Hepatic Function Panel:    Lab Results   Component Value Date/Time    ALKPHOS 72 10/07/2021 01:05 PM    ALT 28 10/07/2021 01:05 PM    AST 18 10/07/2021 01:05 PM    PROT 6.8 10/07/2021 01:05 PM    BILITOT 0.4 10/07/2021 01:05 PM    BILIDIR <0.2 07/25/2017 04:46 AM    LABALBU 4.4 10/07/2021 01:05 PM       Magnesium:    Lab Results   Component Value Date/Time    MG 1.8 07/26/2017 03:33 AM       PT/INR:    Lab Results   Component Value Date/Time    INR 1.02 06/23/2022 06:17 AM       HgBA1c:    Lab Results   Component Value Date/Time LABA1C 7.2 03/11/2015 06:55 PM       FLP:    Lab Results   Component Value Date/Time    TRIG 284 06/23/2022 06:17 AM    HDL 26 06/23/2022 06:17 AM    LDLCALC 92 06/23/2022 06:17 AM       TSH:  No results found for: TSH     Diagnosis Orders   1. Hypertension, unspecified type  93431 - [place] ELECTROCARDIOGRAM, COMPLETE    CBC    Basic Metabolic Panel    Lipid Panel    Hepatic Function Panel      2. Dyslipidemia (high LDL; low HDL)  CBC    Basic Metabolic Panel    Lipid Panel    Hepatic Function Panel           Assessment/Plan    Elbert Boss is 69years old gentleman history of coronary artery disease history of prior intervention mild systolic dysfunction of the left ventricle. Here for a follow-up he denied chest pain his main issue is his leg pain he had an EMILY which was negative    Has history of hypercholesterolemia under control history of hypertension has been under control. The patient overall indicates he is feeling very well we will continue with the current medication medication were reviewed reconciled and sent to his pharmacy he will be seen periodically and seek medical attention if he had any change in clinical condition    Orders Placed This Encounter   Procedures    CBC     Standing Status:   Future     Standing Expiration Date:   13/33/7248    Basic Metabolic Panel     Standing Status:   Future     Standing Expiration Date:   12/21/2023    Lipid Panel     Standing Status:   Future     Standing Expiration Date:   12/21/2023     Order Specific Question:   Is Patient Fasting?/# of Hours     Answer:   12 hours    Hepatic Function Panel     Standing Status:   Future     Standing Expiration Date:   12/21/2023    97348 - [place] ELECTROCARDIOGRAM, COMPLETE       Return in about 6 months (around 6/21/2023) for cad.      Carla Randolph MD

## 2022-12-27 ENCOUNTER — TELEPHONE (OUTPATIENT)
Dept: CARDIOLOGY CLINIC | Age: 77
End: 2022-12-27

## 2023-06-22 ENCOUNTER — OFFICE VISIT (OUTPATIENT)
Dept: CARDIOLOGY CLINIC | Age: 78
End: 2023-06-22
Payer: OTHER GOVERNMENT

## 2023-06-22 VITALS
DIASTOLIC BLOOD PRESSURE: 62 MMHG | HEIGHT: 72 IN | HEART RATE: 49 BPM | WEIGHT: 197 LBS | SYSTOLIC BLOOD PRESSURE: 114 MMHG | RESPIRATION RATE: 18 BRPM | BODY MASS INDEX: 26.68 KG/M2

## 2023-06-22 DIAGNOSIS — I10 HYPERTENSION, UNSPECIFIED TYPE: Primary | ICD-10-CM

## 2023-06-22 DIAGNOSIS — E78.5 DYSLIPIDEMIA (HIGH LDL; LOW HDL): ICD-10-CM

## 2023-06-22 PROCEDURE — 93000 ELECTROCARDIOGRAM COMPLETE: CPT | Performed by: INTERNAL MEDICINE

## 2023-06-22 PROCEDURE — 99214 OFFICE O/P EST MOD 30 MIN: CPT | Performed by: INTERNAL MEDICINE

## 2023-06-22 PROCEDURE — 1123F ACP DISCUSS/DSCN MKR DOCD: CPT | Performed by: INTERNAL MEDICINE

## 2023-06-22 PROCEDURE — 3078F DIAST BP <80 MM HG: CPT | Performed by: INTERNAL MEDICINE

## 2023-06-22 PROCEDURE — 3074F SYST BP LT 130 MM HG: CPT | Performed by: INTERNAL MEDICINE

## 2023-06-22 RX ORDER — EVOLOCUMAB 140 MG/ML
140 INJECTION, SOLUTION SUBCUTANEOUS
Qty: 2.1 ML | Refills: 3 | Status: SHIPPED | OUTPATIENT
Start: 2023-06-22

## 2023-06-22 RX ORDER — ATORVASTATIN CALCIUM 80 MG/1
80 TABLET, FILM COATED ORAL DAILY
Qty: 90 TABLET | Refills: 3 | Status: SHIPPED | OUTPATIENT
Start: 2023-06-22

## 2023-06-22 RX ORDER — M-VIT,TX,IRON,MINS/CALC/FOLIC 27MG-0.4MG
1 TABLET ORAL DAILY
COMMUNITY

## 2023-06-22 RX ORDER — EZETIMIBE 10 MG/1
10 TABLET ORAL DAILY
Qty: 90 TABLET | Refills: 3 | Status: SHIPPED | OUTPATIENT
Start: 2023-06-22

## 2023-06-22 RX ORDER — CARVEDILOL 25 MG/1
12.5 TABLET ORAL 2 TIMES DAILY WITH MEALS
COMMUNITY

## 2023-06-22 ASSESSMENT — ENCOUNTER SYMPTOMS
APNEA: 0
NAUSEA: 0
WHEEZING: 0
ABDOMINAL PAIN: 0
COLOR CHANGE: 0
CHOKING: 0
ANAL BLEEDING: 0
VOICE CHANGE: 0
SHORTNESS OF BREATH: 1
STRIDOR: 0
COUGH: 0
VOMITING: 0
BLOOD IN STOOL: 0
ABDOMINAL DISTENTION: 0
CHEST TIGHTNESS: 0
TROUBLE SWALLOWING: 0

## 2023-06-22 NOTE — PROGRESS NOTES
for Dry Eyes      PANTOPRAZOLE SODIUM PO Take 40 mg by mouth daily       Omega-3 Fatty Acids (FISH OIL) 1000 MG CAPS Take 1 capsule by mouth daily      gabapentin (NEURONTIN) 600 MG tablet Take 1 tablet by mouth 3 times daily. naproxen (NAPROSYN) 500 MG tablet Take 1 tablet by mouth as needed      metFORMIN (GLUCOPHAGE) 1000 MG tablet Take 1 tablet by mouth 2 times daily (with meals)      aspirin 81 MG tablet Take 1 tablet by mouth daily      PARoxetine (PAXIL) 30 MG tablet Take 2 tablets by mouth every morning      Albuterol Sulfate (PROVENTIL HFA IN) Inhale 2 puffs into the lungs 4 times daily as needed        No current facility-administered medications for this visit. Social History     Socioeconomic History    Marital status:      Spouse name: leo    Number of children: None    Years of education: None    Highest education level: None   Tobacco Use    Smoking status: Former     Types: Cigarettes     Quit date: 1976     Years since quittin.5    Smokeless tobacco: Former   Vaping Use    Vaping Use: Never used   Substance and Sexual Activity    Alcohol use: Yes     Comment: rare     Drug use: Yes     Types: Marijuana (Weed)     Comment: social    Sexual activity: Not Currently       Family History   Problem Relation Age of Onset    Cancer Father     Emphysema Mother     Other Maternal Grandmother         killed in MVA    Stroke Maternal Grandfather     Kidney Disease Paternal Grandmother     Cancer Paternal Uncle     Heart Disease Paternal Uncle     Cancer Paternal Uncle     Hearing Loss Brother     Diabetes Neg Hx     High Blood Pressure Neg Hx        Blood pressure 114/62, pulse (!) 49, resp. rate 18, height 5' 11.5\" (1.816 m), weight 197 lb (89.4 kg).     Physical Exam:    General Appearance: alert and oriented to person, place and time, in no acute distress  Cardiovascular: normal rate, regular rhythm, normal S1 and S2, no murmurs, rubs, clicks, or gallops, distal pulses intact, no

## 2023-10-04 ENCOUNTER — TELEPHONE (OUTPATIENT)
Dept: CARDIOLOGY CLINIC | Age: 78
End: 2023-10-04

## 2023-10-04 DIAGNOSIS — R00.1 BRADYCARDIA: ICD-10-CM

## 2023-10-04 DIAGNOSIS — R42 DIZZY: Primary | ICD-10-CM

## 2023-10-04 NOTE — TELEPHONE ENCOUNTER
We received a fax from [facility] re b/p 169//91 hr 45 126/59 hr 35. Ericka ordered 14 day holter. Called to Abdulaziz Smoker scheduled for 10/24/23 10:30. Abdulaziz Smoker voiced understanding.

## 2023-10-24 ENCOUNTER — HOSPITAL ENCOUNTER (OUTPATIENT)
Dept: NON INVASIVE DIAGNOSTICS | Age: 78
Discharge: HOME | End: 2023-10-24
Payer: OTHER GOVERNMENT

## 2023-10-24 DIAGNOSIS — R00.1 BRADYCARDIA: ICD-10-CM

## 2023-10-24 DIAGNOSIS — R42 DIZZY: ICD-10-CM

## 2023-10-24 PROCEDURE — 93270 REMOTE 30 DAY ECG REV/REPORT: CPT

## 2023-10-24 NOTE — PROCEDURES
14 day event monitor applied. Instructions given and patient had no further questions to this time.  Karen JARVIS

## 2023-11-22 ENCOUNTER — OFFICE VISIT (OUTPATIENT)
Dept: CARDIOLOGY CLINIC | Age: 78
End: 2023-11-22
Payer: MEDICARE

## 2023-11-22 VITALS
SYSTOLIC BLOOD PRESSURE: 134 MMHG | HEIGHT: 72 IN | BODY MASS INDEX: 27.63 KG/M2 | RESPIRATION RATE: 22 BRPM | DIASTOLIC BLOOD PRESSURE: 85 MMHG | WEIGHT: 204 LBS | HEART RATE: 93 BPM

## 2023-11-22 DIAGNOSIS — E78.5 DYSLIPIDEMIA (HIGH LDL; LOW HDL): ICD-10-CM

## 2023-11-22 DIAGNOSIS — I25.10 CAD IN NATIVE ARTERY: Primary | ICD-10-CM

## 2023-11-22 PROCEDURE — 3079F DIAST BP 80-89 MM HG: CPT | Performed by: INTERNAL MEDICINE

## 2023-11-22 PROCEDURE — 99214 OFFICE O/P EST MOD 30 MIN: CPT | Performed by: INTERNAL MEDICINE

## 2023-11-22 PROCEDURE — 93000 ELECTROCARDIOGRAM COMPLETE: CPT | Performed by: INTERNAL MEDICINE

## 2023-11-22 PROCEDURE — 1123F ACP DISCUSS/DSCN MKR DOCD: CPT | Performed by: INTERNAL MEDICINE

## 2023-11-22 PROCEDURE — 3075F SYST BP GE 130 - 139MM HG: CPT | Performed by: INTERNAL MEDICINE

## 2023-11-22 RX ORDER — CLOPIDOGREL BISULFATE 75 MG/1
75 TABLET ORAL DAILY
Qty: 90 TABLET | Refills: 3 | Status: SHIPPED | OUTPATIENT
Start: 2023-11-22

## 2023-11-22 RX ORDER — NITROGLYCERIN 0.4 MG/1
0.4 TABLET SUBLINGUAL EVERY 5 MIN PRN
Qty: 25 TABLET | Refills: 3 | Status: SHIPPED | OUTPATIENT
Start: 2023-11-22

## 2023-11-22 RX ORDER — CLONIDINE HYDROCHLORIDE 0.1 MG/1
0.1 TABLET ORAL 2 TIMES DAILY
Qty: 60 TABLET | Refills: 3 | Status: SHIPPED | OUTPATIENT
Start: 2023-11-22

## 2023-11-22 RX ORDER — AMLODIPINE BESYLATE 5 MG/1
5 TABLET ORAL DAILY
Qty: 90 TABLET | Refills: 3 | Status: SHIPPED | OUTPATIENT
Start: 2023-11-22

## 2023-11-22 RX ORDER — BUMETANIDE 2 MG/1
2 TABLET ORAL DAILY
Qty: 90 TABLET | Refills: 0 | Status: SHIPPED | OUTPATIENT
Start: 2023-11-22

## 2023-11-22 RX ORDER — CARVEDILOL 25 MG/1
12.5 TABLET ORAL 2 TIMES DAILY WITH MEALS
Qty: 180 TABLET | Refills: 3 | Status: SHIPPED | OUTPATIENT
Start: 2023-11-22

## 2023-11-22 ASSESSMENT — ENCOUNTER SYMPTOMS
ABDOMINAL DISTENTION: 0
VOICE CHANGE: 0
ABDOMINAL PAIN: 0
APNEA: 0
VOMITING: 0
COLOR CHANGE: 0
NAUSEA: 0
COUGH: 0
BLOOD IN STOOL: 0
WHEEZING: 0
TROUBLE SWALLOWING: 0
SHORTNESS OF BREATH: 0
ANAL BLEEDING: 0
CHEST TIGHTNESS: 0
CHOKING: 0
STRIDOR: 0

## 2023-11-22 NOTE — PROGRESS NOTES
Diabetes Neg Hx     High Blood Pressure Neg Hx        Blood pressure 134/85, pulse 93, resp. rate 22, height 1.816 m (5' 11.5\"), weight 92.5 kg (204 lb). Physical Exam:    General Appearance: alert and oriented to person, place and time, in no acute distress  Cardiovascular: normal rate, regular rhythm, normal S1 and S2, no murmurs, rubs, clicks, or gallops, distal pulses intact, no carotid bruits, no JVD  Pulmonary/Chest: clear to auscultation bilaterally- no wheezes, rales or rhonchi, normal air movement, no respiratory distress  Abdomen: soft, non-tender, non-distended, normal bowel sounds, no masses   Extremities: no cyanosis, clubbing or edema, pulse   Skin: warm and dry, no rash or erythema  Head: normocephalic and atraumatic  Eyes: pupils equal, round, and reactive to light  Neck: supple and non-tender without mass, no thyromegaly   Musculoskeletal: normal range of motion, no joint swelling, deformity or tenderness  Neurological: alert, oriented, normal speech, no focal findings or movement disorder noted    Lab Data:    Cardiac Enzymes:  No results for input(s): \"CKTOTAL\", \"CKMB\", \"CKMBINDEX\", \"TROPONINI\" in the last 72 hours.     CBC:   Lab Results   Component Value Date/Time    WBC 6.1 06/23/2022 06:17 AM    RBC 4.48 06/23/2022 06:17 AM    HGB 14.0 06/23/2022 06:17 AM    HCT 42.4 06/23/2022 06:17 AM     06/23/2022 06:17 AM       CMP:    Lab Results   Component Value Date/Time     06/23/2022 06:17 AM    K 3.8 06/23/2022 06:17 AM    K 4.6 10/07/2021 01:05 PM     06/23/2022 06:17 AM    CO2 27 06/23/2022 06:17 AM    BUN 13 06/23/2022 06:17 AM    CREATININE 0.9 06/23/2022 06:17 AM    LABGLOM 82 06/23/2022 06:17 AM    GLUCOSE 161 06/23/2022 06:17 AM    CALCIUM 9.0 06/23/2022 06:17 AM       Hepatic Function Panel:    Lab Results   Component Value Date/Time    ALKPHOS 72 10/07/2021 01:05 PM    ALT 28 10/07/2021 01:05 PM    AST 18 10/07/2021 01:05 PM    PROT 6.8 10/07/2021 01:05 PM    BILITOT

## 2023-12-04 ENCOUNTER — TELEPHONE (OUTPATIENT)
Dept: CARDIOLOGY CLINIC | Age: 78
End: 2023-12-04

## 2023-12-04 NOTE — TELEPHONE ENCOUNTER
Linda Urena was started on Norvasc mg/qd, Cesar Cheers was increased and Clonidine 0.1/prn. He had an episode where he was dizzy and fell. Randal Hinton said to stop the Norvasc and Clonidine and keep eye on BP.

## 2023-12-05 ENCOUNTER — TELEPHONE (OUTPATIENT)
Dept: CARDIOLOGY CLINIC | Age: 78
End: 2023-12-05

## 2023-12-05 NOTE — TELEPHONE ENCOUNTER
Had appt 11/22/23 medication changes. Added amlodipine, changed clonidine and bumex to prn, and increased entresto (previously taking 49-51mg). Patient called reporting he is no longer taking amlodipine or clonidine but blood pressure is reading 71/50 hr 90s. Feels dizzy sometimes with exertion. Encouraged fluids and avoid activity until bp comes up. Also encouraged him to go to ER if symptoms get any worse. What do you want to do?           Medication Changes      amLODIPine Besylate 5 mg Oral DAILY    cloNIDine HCl 0.1 mg Oral 2 TIMES DAILY, Take one tab  prn for systolic BP above 896    Sacubitril-Valsartan  MG 1 tablet Oral 2 TIMES DAILY    Bumetanide 2 mg Oral DAILY, AS NEEDED FOR SHORTNESS OF BREATH

## 2023-12-07 RX ORDER — SACUBITRIL AND VALSARTAN 49; 51 MG/1; MG/1
1 TABLET, FILM COATED ORAL 2 TIMES DAILY
COMMUNITY
End: 2023-12-07 | Stop reason: SDUPTHER

## 2023-12-07 RX ORDER — SACUBITRIL AND VALSARTAN 49; 51 MG/1; MG/1
1 TABLET, FILM COATED ORAL 2 TIMES DAILY
Qty: 180 TABLET | Refills: 1 | Status: SHIPPED | OUTPATIENT
Start: 2023-12-07

## 2023-12-08 ENCOUNTER — TELEPHONE (OUTPATIENT)
Dept: CARDIOLOGY CLINIC | Age: 78
End: 2023-12-08

## 2023-12-08 NOTE — TELEPHONE ENCOUNTER
Kaitlin Desai called his pulse has been up and down and the lowest was 35. Lizz De La Cruz told patient to stop Coreg.

## 2023-12-14 ENCOUNTER — HOSPITAL ENCOUNTER (EMERGENCY)
Age: 78
Discharge: HOME | End: 2023-12-14
Attending: EMERGENCY MEDICINE
Payer: OTHER GOVERNMENT

## 2023-12-14 ENCOUNTER — APPOINTMENT (OUTPATIENT)
Dept: CT IMAGING | Age: 78
End: 2023-12-14
Payer: OTHER GOVERNMENT

## 2023-12-14 VITALS
WEIGHT: 198 LBS | BODY MASS INDEX: 26.82 KG/M2 | DIASTOLIC BLOOD PRESSURE: 83 MMHG | HEART RATE: 53 BPM | HEIGHT: 72 IN | OXYGEN SATURATION: 99 % | SYSTOLIC BLOOD PRESSURE: 166 MMHG | TEMPERATURE: 98.3 F | RESPIRATION RATE: 18 BRPM

## 2023-12-14 DIAGNOSIS — I10 HYPERTENSION, UNSPECIFIED TYPE: Primary | ICD-10-CM

## 2023-12-14 LAB
ANION GAP SERPL CALC-SCNC: 13 MEQ/L (ref 8–16)
BASOPHILS ABSOLUTE: 0 THOU/MM3 (ref 0–0.1)
BASOPHILS NFR BLD AUTO: 0.3 %
BUN SERPL-MCNC: 8 MG/DL (ref 7–22)
CALCIUM SERPL-MCNC: 8.8 MG/DL (ref 8.5–10.5)
CHLORIDE SERPL-SCNC: 102 MEQ/L (ref 98–111)
CO2 SERPL-SCNC: 22 MEQ/L (ref 23–33)
CREAT SERPL-MCNC: 1 MG/DL (ref 0.4–1.2)
DEPRECATED RDW RBC AUTO: 41.4 FL (ref 35–45)
EKG ATRIAL RATE: 77 BPM
EKG P AXIS: 71 DEGREES
EKG P-R INTERVAL: 164 MS
EKG Q-T INTERVAL: 364 MS
EKG QRS DURATION: 90 MS
EKG QTC CALCULATION (BAZETT): 411 MS
EKG R AXIS: 66 DEGREES
EKG T AXIS: 63 DEGREES
EKG VENTRICULAR RATE: 77 BPM
EOSINOPHIL NFR BLD AUTO: 2.1 %
EOSINOPHILS ABSOLUTE: 0.1 THOU/MM3 (ref 0–0.4)
ERYTHROCYTE [DISTWIDTH] IN BLOOD BY AUTOMATED COUNT: 12.5 % (ref 11.5–14.5)
FLUAV RNA RESP QL NAA+PROBE: NOT DETECTED
FLUBV RNA RESP QL NAA+PROBE: NOT DETECTED
GFR SERPL CREATININE-BSD FRML MDRD: > 60 ML/MIN/1.73M2
GLUCOSE SERPL-MCNC: 231 MG/DL (ref 70–108)
HCT VFR BLD AUTO: 43.5 % (ref 42–52)
HGB BLD-MCNC: 14.8 GM/DL (ref 14–18)
IMM GRANULOCYTES # BLD AUTO: 0.03 THOU/MM3 (ref 0–0.07)
IMM GRANULOCYTES NFR BLD AUTO: 0.5 %
LYMPHOCYTES ABSOLUTE: 1.2 THOU/MM3 (ref 1–4.8)
LYMPHOCYTES NFR BLD AUTO: 20.3 %
MCH RBC QN AUTO: 31 PG (ref 26–33)
MCHC RBC AUTO-ENTMCNC: 34 GM/DL (ref 32.2–35.5)
MCV RBC AUTO: 91 FL (ref 80–94)
MONOCYTES ABSOLUTE: 0.4 THOU/MM3 (ref 0.4–1.3)
MONOCYTES NFR BLD AUTO: 6.4 %
NEUTROPHILS NFR BLD AUTO: 70.4 %
NRBC BLD AUTO-RTO: 0 /100 WBC
OSMOLALITY SERPL CALC.SUM OF ELEC: 279.5 MOSMOL/KG (ref 275–300)
PLATELET # BLD AUTO: 207 THOU/MM3 (ref 130–400)
PMV BLD AUTO: 9.7 FL (ref 9.4–12.4)
POTASSIUM SERPL-SCNC: 4 MEQ/L (ref 3.5–5.2)
RBC # BLD AUTO: 4.78 MILL/MM3 (ref 4.7–6.1)
SARS-COV-2 RNA RESP QL NAA+PROBE: NOT DETECTED
SEGMENTED NEUTROPHILS ABSOLUTE COUNT: 4.1 THOU/MM3 (ref 1.8–7.7)
SODIUM SERPL-SCNC: 137 MEQ/L (ref 135–145)
T4 FREE SERPL-MCNC: 0.89 NG/DL (ref 0.93–1.76)
TROPONIN, HIGH SENSITIVITY: 22 NG/L (ref 0–12)
TROPONIN, HIGH SENSITIVITY: 24 NG/L (ref 0–12)
TSH SERPL DL<=0.005 MIU/L-ACNC: 2.11 UIU/ML (ref 0.4–4.2)
WBC # BLD AUTO: 5.8 THOU/MM3 (ref 4.8–10.8)

## 2023-12-14 PROCEDURE — 93005 ELECTROCARDIOGRAM TRACING: CPT | Performed by: EMERGENCY MEDICINE

## 2023-12-14 PROCEDURE — 80048 BASIC METABOLIC PNL TOTAL CA: CPT

## 2023-12-14 PROCEDURE — 2580000003 HC RX 258: Performed by: EMERGENCY MEDICINE

## 2023-12-14 PROCEDURE — 96360 HYDRATION IV INFUSION INIT: CPT

## 2023-12-14 PROCEDURE — 84484 ASSAY OF TROPONIN QUANT: CPT

## 2023-12-14 PROCEDURE — 96361 HYDRATE IV INFUSION ADD-ON: CPT

## 2023-12-14 PROCEDURE — 85025 COMPLETE CBC W/AUTO DIFF WBC: CPT

## 2023-12-14 PROCEDURE — 36415 COLL VENOUS BLD VENIPUNCTURE: CPT

## 2023-12-14 PROCEDURE — 84443 ASSAY THYROID STIM HORMONE: CPT

## 2023-12-14 PROCEDURE — 87636 SARSCOV2 & INF A&B AMP PRB: CPT

## 2023-12-14 PROCEDURE — 70450 CT HEAD/BRAIN W/O DYE: CPT

## 2023-12-14 PROCEDURE — 99284 EMERGENCY DEPT VISIT MOD MDM: CPT

## 2023-12-14 PROCEDURE — 84439 ASSAY OF FREE THYROXINE: CPT

## 2023-12-14 RX ORDER — 0.9 % SODIUM CHLORIDE 0.9 %
1000 INTRAVENOUS SOLUTION INTRAVENOUS ONCE
Status: COMPLETED | OUTPATIENT
Start: 2023-12-14 | End: 2023-12-14

## 2023-12-14 RX ADMIN — SODIUM CHLORIDE 1000 ML: 9 INJECTION, SOLUTION INTRAVENOUS at 17:33

## 2023-12-14 ASSESSMENT — PAIN - FUNCTIONAL ASSESSMENT
PAIN_FUNCTIONAL_ASSESSMENT: NONE - DENIES PAIN

## 2023-12-14 NOTE — ED PROVIDER NOTES
ATTENDING NOTE:    I supervised and discussed the history, physical exam and the management of this patient with the resident. I reviewed the resident's note and agree with the documented findings and plan of care. Please see my additional note. I personally saw and examined the patient. I have reviewed and agree with the resident's findings, including all diagnostic interpretations and treatment plans as written. I was present for the key portion of any procedures performed and the inclusive time noted in any critical care statement.     Electronically verified by Timur Baires
appropriate to consider the following emergency medical conditions:    Poorly controlled hypertension versus hypertensive urgency or emergency    Plan: CT head, CBC, BMP, Trop, BN P    Although some of these diagnoses are unlikely they were considered in my medical decision making. Chronic Conditions considered:   Patient Active Problem List   Diagnosis    Spinal stenosis, lumbar region, with neurogenic claudication    Metabolic acidosis    Leukocytosis    Normocytic anemia    DM (diabetes mellitus), type 2, uncontrolled    HTN (hypertension)    MALIKA (obstructive sleep apnea)    HLD (hyperlipidemia)    Asthma    Depression    Constipation    H/O class III angina pectoris    Ventricular arrhythmia    Paroxysmal atrial fibrillation (HCC)    Obesity due to excess calories    CAD in native artery    Angina pectoris associated with type 2 diabetes mellitus ([address])    Abnormal nuclear stress test       ED RESULTS   Laboratory results:  Labs Reviewed   BASIC METABOLIC PANEL - Abnormal; Notable for the following components:       Result Value    CO2 22 (*)     Glucose 231 (*)     All other components within normal limits   TROPONIN - Abnormal; Notable for the following components:    Troponin, High Sensitivity 24 (*)     All other components within normal limits   TROPONIN - Abnormal; Notable for the following components:    Troponin, High Sensitivity 22 (*)     All other components within normal limits   T4, FREE - Abnormal; Notable for the following components:    T4 Free 0.89 (*)     All other components within normal limits   COVID-19 & INFLUENZA COMBO   CBC WITH AUTO DIFFERENTIAL   ANION GAP   GLOMERULAR FILTRATION RATE, ESTIMATED   OSMOLALITY   TSH       Radiologic studies results:  CT HEAD WO CONTRAST   Final Result   No acute intracranial findings.       This document has been electronically signed by: Laury Morillo MD on    12/14/2023 05:53 PM      All CTs at this facility use dose modulation techniques and iterative

## 2023-12-14 NOTE — ED TRIAGE NOTES
Pt presents to the ED for hypertension. Pt states his BP at home was 391D systolic. Pt states that he is fatigued and [facility] Pt [facility] the [place] and they recommended he come to the ED. Pt denies new pain.

## 2023-12-15 NOTE — ED NOTES
Report received from [facility].      Jane Madera RN  12/14/23 1911

## 2023-12-15 NOTE — DISCHARGE INSTRUCTIONS
You were seen at [facility] emergency department for high blood pressure and a fast heart rate. In the emergency department, your blood work was not concerning and your COVID test was negative. The CAT scan of your head did not have anything abnormal.  Your blood pressure remained below 897 systolic, and we did not give you any medication for this. Your heart rate also remained susu. At this point, you may return home. Please be sure to follow-up with your cardiologist within the next 2 weeks. You most likely need to adjust your blood pressure medications. If you experience blood pressure consistently over 190 on the top number, or if you develop chest pain, please return to the emergency department.

## 2023-12-15 NOTE — ED NOTES
Patient VS stable. Patient resting on cot. Telemetry in place. Call light in reach. Patient updated on POC. Patient verbalized understanding. Patient denies any needs at this time.       Eloy Barnes RN  12/14/23 1375

## 2023-12-19 ENCOUNTER — TELEPHONE (OUTPATIENT)
Dept: CARDIOLOGY CLINIC | Age: 78
End: 2023-12-19

## 2023-12-19 NOTE — TELEPHONE ENCOUNTER
[facility]  [facility] Ref[facility] has . Patient has and Echo and VAS Duplex study scheduled 2023. Thank you! Belkis Velasquez, MSN, RN    Since testing is today and Terry Carmen expires today are we okay with proceeding with testing? Also sending fax to the [facility].

## 2024-01-31 RX ORDER — BUMETANIDE 2 MG/1
2 TABLET ORAL DAILY
Qty: 90 TABLET | Refills: 3 | Status: SHIPPED | OUTPATIENT
Start: 2024-01-31

## 2024-01-31 RX ORDER — SACUBITRIL AND VALSARTAN 49; 51 MG/1; MG/1
1 TABLET, FILM COATED ORAL 2 TIMES DAILY
Qty: 180 TABLET | Refills: 3 | Status: SHIPPED | OUTPATIENT
Start: 2024-01-31

## 2024-01-31 RX ORDER — EZETIMIBE 10 MG/1
10 TABLET ORAL DAILY
Qty: 90 TABLET | Refills: 3 | Status: SHIPPED | OUTPATIENT
Start: 2024-01-31

## 2024-01-31 RX ORDER — ASPIRIN 81 MG/1
81 TABLET ORAL DAILY
Qty: 90 TABLET | Refills: 3 | Status: SHIPPED | OUTPATIENT
Start: 2024-01-31

## 2024-01-31 RX ORDER — ATORVASTATIN CALCIUM 80 MG/1
80 TABLET, FILM COATED ORAL DAILY
Qty: 90 TABLET | Refills: 3 | Status: SHIPPED | OUTPATIENT
Start: 2024-01-31

## 2024-01-31 RX ORDER — NITROGLYCERIN 0.4 MG/1
0.4 TABLET SUBLINGUAL EVERY 5 MIN PRN
Qty: 25 TABLET | Refills: 3 | Status: SHIPPED | OUTPATIENT
Start: 2024-01-31

## 2024-02-05 ENCOUNTER — TELEPHONE (OUTPATIENT)
Dept: CARDIOLOGY CLINIC | Age: 79
End: 2024-02-05

## 2024-02-07 RX ORDER — CARVEDILOL 6.25 MG/1
6.25 TABLET ORAL 2 TIMES DAILY WITH MEALS
COMMUNITY
End: 2024-02-07 | Stop reason: SDUPTHER

## 2024-02-07 NOTE — TELEPHONE ENCOUNTER
Spoke with wife Antonino.  On HIPAA.  Antonino voiced understanding.  Med list updated and med pended in separate encounter.

## 2024-02-08 RX ORDER — CARVEDILOL 6.25 MG/1
6.25 TABLET ORAL 2 TIMES DAILY WITH MEALS
Qty: 180 TABLET | Refills: 2 | Status: SHIPPED | OUTPATIENT
Start: 2024-02-08

## 2024-03-14 ENCOUNTER — TELEPHONE (OUTPATIENT)
Dept: CARDIOLOGY CLINIC | Age: 79
End: 2024-03-14

## 2024-03-14 NOTE — TELEPHONE ENCOUNTER
Flory DAVIS is calling to get Cristiano's  OV from [facility]. Please fax to [phone]. ATTN: Yara

## 2024-03-21 ENCOUNTER — TELEPHONE (OUTPATIENT)
Dept: CARDIOLOGY CLINIC | Age: 79
End: 2024-03-21

## 2024-03-21 DIAGNOSIS — I49.9 VENTRICULAR ARRHYTHMIA: Primary | ICD-10-CM

## 2024-03-25 NOTE — TELEPHONE ENCOUNTER
[facility] lm saying HR has been running low     Pt states when he first wake ups its running around 33-34  But after he wakes up a bit it goes back up to 70s    Denies any syncope     BP while I was on the phone with him was 138/68 HR 86

## 2024-03-28 ENCOUNTER — TELEPHONE (OUTPATIENT)
Dept: CARDIOLOGY CLINIC | Age: 79
End: 2024-03-28

## 2024-03-28 NOTE — TELEPHONE ENCOUNTER
Pt last seen by Dr. Woodard 12-18-23.  Pt is needing clearance for Caudal Epidural with BVPM TBD.  Pt is on ASA and Plavix .    Fax [phone]

## 2024-04-17 ENCOUNTER — HOSPITAL ENCOUNTER (OUTPATIENT)
Age: 79
Discharge: HOME | End: 2024-04-19
Attending: INTERNAL MEDICINE
Payer: MEDICARE

## 2024-04-17 DIAGNOSIS — I49.9 VENTRICULAR ARRHYTHMIA: ICD-10-CM

## 2024-04-17 PROCEDURE — 93226 XTRNL ECG REC<48 HR SCAN A/R: CPT

## 2024-04-17 PROCEDURE — 93225 XTRNL ECG REC<48 HRS REC: CPT

## 2024-04-18 NOTE — TELEPHONE ENCOUNTER
Cristiano Stephens called requesting a refill on the following medications:  Requested Prescriptions     Pending Prescriptions Disp Refills    alirocumab (PRALUENT) 75 MG/ML SOAJ injection pen 2 Adjustable Dose Pre-filled Pen Syringe 11     Sig: Inject 1 mL into the skin every 14 days     Pharmacy verified: [facility]      Date of last visit: 12/18/2023  Date of next visit (if applicable): 6/12/2024

## 2024-04-20 LAB
ACQUISITION DURATION: NORMAL S
AVERAGE HEART RATE: 88 BPM
HOOKUP DATE: NORMAL
HOOKUP TIME: NORMAL
MAX HEART RATE TIME/DATE: NORMAL
MAX HEART RATE: 142 BPM
MIN HEART RATE TIME/DATE: NORMAL
MIN HEART RATE: 52 BPM
NUMBER OF QRS COMPLEXES: NORMAL
NUMBER OF SUPRAVENTRICULAR COUPLETS: 0
NUMBER OF SUPRAVENTRICULAR ECTOPICS: 34
NUMBER OF SUPRAVENTRICULAR ISOLATED BEATS: 31
NUMBER OF VENTRICULAR BIGEMINAL CYCLES: 490
NUMBER OF VENTRICULAR COUPLETS: 462
NUMBER OF VENTRICULAR ECTOPICS: NORMAL

## 2024-06-11 NOTE — PATIENT INSTRUCTIONS
Your Provider for Today: Dr. Woodard  Your nurses for today: Odette    You may receive a survey regarding the care you received during your visit.  Your input is valuable to us.  We encourage you to complete and return your survey.  We hope you will choose us in the future for your healthcare needs.

## 2024-06-12 ENCOUNTER — OFFICE VISIT (OUTPATIENT)
Dept: CARDIOLOGY CLINIC | Age: 79
End: 2024-06-12
Payer: OTHER GOVERNMENT

## 2024-06-12 VITALS
DIASTOLIC BLOOD PRESSURE: 60 MMHG | HEIGHT: 72 IN | WEIGHT: 203.6 LBS | BODY MASS INDEX: 27.58 KG/M2 | SYSTOLIC BLOOD PRESSURE: 104 MMHG | HEART RATE: 56 BPM

## 2024-06-12 DIAGNOSIS — I25.83 CORONARY ARTERY DISEASE DUE TO LIPID RICH PLAQUE: Primary | ICD-10-CM

## 2024-06-12 DIAGNOSIS — I25.10 CORONARY ARTERY DISEASE DUE TO LIPID RICH PLAQUE: Primary | ICD-10-CM

## 2024-06-12 PROCEDURE — 3078F DIAST BP <80 MM HG: CPT | Performed by: INTERNAL MEDICINE

## 2024-06-12 PROCEDURE — 1123F ACP DISCUSS/DSCN MKR DOCD: CPT | Performed by: INTERNAL MEDICINE

## 2024-06-12 PROCEDURE — 3074F SYST BP LT 130 MM HG: CPT | Performed by: INTERNAL MEDICINE

## 2024-06-12 PROCEDURE — 99214 OFFICE O/P EST MOD 30 MIN: CPT | Performed by: INTERNAL MEDICINE

## 2024-06-12 NOTE — PROGRESS NOTES
Pt here for 6 month check up     Denies chest pain, SOB, or palpitations.       
CARDIAC SURGERY      4 stents; 2016    COLONOSCOPY N/A 8/14/2019    COLONOSCOPY POLYPECTOMY SNARE/COLD BIOPSY performed by Radha Malin MD at [facility]    COLONOSCOPY  8/14/2019    COLONOSCOPY SUBMUCOSAL/BOTOX INJECTION performed by Radha Malin MD at [facility]    DENTAL SURGERY      JOINT REPLACEMENT Bilateral     knees    KNEE ARTHROPLASTY Bilateral     OTHER SURGICAL HISTORY      wounded [place] not sure of what all they did had surgery to leg, had delayed wound closure    MD SURGICAL ARTHROSCOPY SHOULDER W/ROTATOR CUFF RPR Right 8/24/2018    RIGHT SHOULDER OPERATIVE ARTHROSCOPY, ROTATOR CUFF REPAIR, SUBACROMIAL DECOMPRESSION, DISTAL CLAVICLE EXCISION, BICEP TENODESIS performed by Jose Paez MD at [facility] OR    SKIN CANCER EXCISION      TONSILLECTOMY      UPPER GASTROINTESTINAL ENDOSCOPY N/A 8/14/2019    EGD ESOPHAGOGASTRODUODENOSCOPY performed by Radha Malin MD at [facility]       Subjective:     REVIEW OF SYSTEMS  Constitutional: denies sweats, chills and fever  HENT: denies  congestion, sinus pressure, sneezing and sore throat.    Eyes: denies  pain, discharge, redness and itching.   Respiratory: denies apnea, cough  Gastrointestinal: denies blood in stool, constipation, diarrhea   Endocrine: denies cold intolerance, heat intolerance, polydipsia.  Genitourinary: denies dysuria, enuresis, flank pain and hematuria.   Musculoskeletal: denies arthralgias, joint swelling and neck pain.   Neurological: denies numbness and headaches.   Psychiatric/Behavioral: denies agitation, confusion, decreased concentration and dysphoric mood    All others reviewed and are negative.   Objective:     /60   Pulse 56   Ht 1.816 m (5' 11.5\")   Wt 92.4 kg (203 lb 9.6 oz)   BMI 28.00 kg/m²     Wt Readings from Last 3 Encounters:   06/12/24 92.4 kg (203 lb 9.6 oz)   12/19/23 92.5 kg (204 lb)   12/18/23 92.9 kg (204 lb 12.8 oz)     BP Readings from Last 3 Encounters:   06/12/24 104/60   12/19/23

## 2024-06-17 ENCOUNTER — TELEPHONE (OUTPATIENT)
Dept: CARDIOLOGY CLINIC | Age: 79
End: 2024-06-17

## 2024-06-17 NOTE — TELEPHONE ENCOUNTER
Pt last seen by Dr. Woodard on 6-12-24.  Pt is needing clearance for Colonoscopy with Endo TBD.  Pt is on ASA and Plavix.    Fax [phone] MULUGETAN Beatriz

## 2024-08-14 RX ORDER — CLOPIDOGREL BISULFATE 75 MG/1
75 TABLET ORAL DAILY
Qty: 90 TABLET | Refills: 3 | Status: SHIPPED | OUTPATIENT
Start: 2024-08-14

## 2024-08-14 NOTE — TELEPHONE ENCOUNTER
Cristiano is requesting a refill of their   Requested Prescriptions     Pending Prescriptions Disp Refills    clopidogrel (PLAVIX) 75 MG tablet 90 tablet 3     Sig: Take 1 tablet by mouth daily   . Please advise.      Last Appt:  Visit date not found  Next Appt:   Visit date not found  Preferred pharmacy: [facility] - [place], [place] - [address] P [phone] - F [phone]

## 2024-09-17 ENCOUNTER — TELEPHONE (OUTPATIENT)
Dept: CARDIOLOGY CLINIC | Age: 79
End: 2024-09-17

## 2024-10-28 RX ORDER — EZETIMIBE 10 MG/1
10 TABLET ORAL DAILY
Qty: 90 TABLET | Refills: 3 | Status: SHIPPED | OUTPATIENT
Start: 2024-10-28

## 2024-10-28 RX ORDER — BUMETANIDE 2 MG/1
2 TABLET ORAL DAILY
Qty: 90 TABLET | Refills: 3 | Status: SHIPPED | OUTPATIENT
Start: 2024-10-28

## 2024-10-28 RX ORDER — ATORVASTATIN CALCIUM 80 MG/1
80 TABLET, FILM COATED ORAL DAILY
Qty: 90 TABLET | Refills: 3 | Status: SHIPPED | OUTPATIENT
Start: 2024-10-28

## 2024-10-28 NOTE — TELEPHONE ENCOUNTER
Cristiano Stephens called requesting a refill on the following medications:  Requested Prescriptions     Pending Prescriptions Disp Refills    atorvastatin (LIPITOR) 80 MG tablet 90 tablet 3     Sig: Take 1 tablet by mouth daily    bumetanide (BUMEX) 2 MG tablet 90 tablet 3     Sig: Take 1 tablet by mouth daily AS NEEDED FOR SHORTNESS OF BREATH    empagliflozin (JARDIANCE) 10 MG tablet 90 tablet 3     Sig: Take 1 tablet by mouth daily    ezetimibe (ZETIA) 10 MG tablet 90 tablet 3     Sig: Take 1 tablet by mouth daily     Pharmacy verified:    [facility] - [place], [place] - [address] - P [phone] - F [phone]     Date of last visit: 06/12/2024  Date of next visit (if applicable): 06/18/2025

## 2024-12-09 ENCOUNTER — TELEPHONE (OUTPATIENT)
Dept: CARDIOLOGY CLINIC | Age: 79
End: 2024-12-09

## 2024-12-09 NOTE — TELEPHONE ENCOUNTER
Pre op Risk Assessment    Procedure colonoscopy  Physician [facility]   Date of surgery/procedure TBD    Last OV 06/12/2024  Last Stress 05/2022  Last Echo 12/19/2023  Last Cath 06/2022  Last Stent ?  Is patient on blood thinners Plavix, ASA  Hold Meds/how many days ??    Fax clearance to [phone]

## 2025-01-07 NOTE — TELEPHONE ENCOUNTER
Cristiano Stephens called requesting a refill on the following medications:  Requested Prescriptions     Pending Prescriptions Disp Refills    alirocumab (PRALUENT) 75 MG/ML SOAJ injection pen 2 Adjustable Dose Pre-filled Pen Syringe 11     Sig: Inject 1 mL into the skin every 14 days     Pharmacy verified:  [facility] - [place], [place] - [address] - P [phone] - F [phone]     Date of last visit: 06/12/2024  Date of next visit (if applicable): 06/18/2025

## 2025-03-08 ENCOUNTER — APPOINTMENT (OUTPATIENT)
Dept: GENERAL RADIOLOGY | Age: 80
End: 2025-03-08
Payer: OTHER GOVERNMENT

## 2025-03-08 ENCOUNTER — HOSPITAL ENCOUNTER (EMERGENCY)
Age: 80
Discharge: HOME | End: 2025-03-08
Payer: OTHER GOVERNMENT

## 2025-03-08 VITALS
BODY MASS INDEX: 26.41 KG/M2 | WEIGHT: 192 LBS | TEMPERATURE: 97.9 F | OXYGEN SATURATION: 95 % | SYSTOLIC BLOOD PRESSURE: 144 MMHG | HEART RATE: 95 BPM | RESPIRATION RATE: 16 BRPM | DIASTOLIC BLOOD PRESSURE: 78 MMHG

## 2025-03-08 DIAGNOSIS — E11.65 HYPERGLYCEMIA DUE TO DIABETES MELLITUS (HCC): ICD-10-CM

## 2025-03-08 DIAGNOSIS — M25.571 ACUTE RIGHT ANKLE PAIN: Primary | ICD-10-CM

## 2025-03-08 LAB
ANION GAP SERPL CALC-SCNC: 14 MEQ/L (ref 8–16)
BASOPHILS ABSOLUTE: 0 THOU/MM3 (ref 0–0.1)
BASOPHILS NFR BLD AUTO: 0.3 %
BUN SERPL-MCNC: 12 MG/DL (ref 8–23)
CALCIUM SERPL-MCNC: 9.2 MG/DL (ref 8.8–10.2)
CHLORIDE SERPL-SCNC: 102 MEQ/L (ref 98–111)
CO2 SERPL-SCNC: 22 MEQ/L (ref 22–29)
CREAT SERPL-MCNC: 1 MG/DL (ref 0.7–1.2)
CRP SERPL-MCNC: 0.99 MG/DL (ref 0–0.5)
DEPRECATED RDW RBC AUTO: 41 FL (ref 35–45)
EOSINOPHIL NFR BLD AUTO: 1.8 %
EOSINOPHILS ABSOLUTE: 0.1 THOU/MM3 (ref 0–0.4)
ERYTHROCYTE [DISTWIDTH] IN BLOOD BY AUTOMATED COUNT: 12.5 % (ref 11.5–14.5)
ERYTHROCYTE [SEDIMENTATION RATE] IN BLOOD BY WESTERGREN METHOD: 7 MM/HR (ref 0–10)
GFR SERPL CREATININE-BSD FRML MDRD: 77 ML/MIN/1.73M2
GLUCOSE SERPL-MCNC: 227 MG/DL (ref 74–109)
HCT VFR BLD AUTO: 45.3 % (ref 42–52)
HGB BLD-MCNC: 15.1 GM/DL (ref 14–18)
IMM GRANULOCYTES # BLD AUTO: 0.02 THOU/MM3 (ref 0–0.07)
IMM GRANULOCYTES NFR BLD AUTO: 0.3 %
LYMPHOCYTES ABSOLUTE: 1.4 THOU/MM3 (ref 1–4.8)
LYMPHOCYTES NFR BLD AUTO: 19.5 %
MCH RBC QN AUTO: 30 PG (ref 26–33)
MCHC RBC AUTO-ENTMCNC: 33.3 GM/DL (ref 32.2–35.5)
MCV RBC AUTO: 90.1 FL (ref 80–94)
MONOCYTES ABSOLUTE: 0.5 THOU/MM3 (ref 0.4–1.3)
MONOCYTES NFR BLD AUTO: 7.3 %
NEUTROPHILS ABSOLUTE: 5.1 THOU/MM3 (ref 1.8–7.7)
NEUTROPHILS NFR BLD AUTO: 70.8 %
NRBC BLD AUTO-RTO: 0 /100 WBC
OSMOLALITY SERPL CALC.SUM OF ELEC: 282.6 MOSMOL/KG (ref 275–300)
PLATELET # BLD AUTO: 207 THOU/MM3 (ref 130–400)
PMV BLD AUTO: 10.2 FL (ref 9.4–12.4)
POTASSIUM SERPL-SCNC: 4.1 MEQ/L (ref 3.5–5.2)
RBC # BLD AUTO: 5.03 MILL/MM3 (ref 4.7–6.1)
SODIUM SERPL-SCNC: 138 MEQ/L (ref 135–145)
URATE SERPL-MCNC: 6.4 MG/DL (ref 3.7–7)
WBC # BLD AUTO: 7.2 THOU/MM3 (ref 4.8–10.8)

## 2025-03-08 PROCEDURE — 36415 COLL VENOUS BLD VENIPUNCTURE: CPT

## 2025-03-08 PROCEDURE — 99284 EMERGENCY DEPT VISIT MOD MDM: CPT

## 2025-03-08 PROCEDURE — 86140 C-REACTIVE PROTEIN: CPT

## 2025-03-08 PROCEDURE — 73610 X-RAY EXAM OF ANKLE: CPT

## 2025-03-08 PROCEDURE — 80048 BASIC METABOLIC PNL TOTAL CA: CPT

## 2025-03-08 PROCEDURE — 84550 ASSAY OF BLOOD/URIC ACID: CPT

## 2025-03-08 PROCEDURE — 6360000002 HC RX W HCPCS: Performed by: PHYSICIAN ASSISTANT

## 2025-03-08 PROCEDURE — 85651 RBC SED RATE NONAUTOMATED: CPT

## 2025-03-08 PROCEDURE — 85025 COMPLETE CBC W/AUTO DIFF WBC: CPT

## 2025-03-08 PROCEDURE — 96372 THER/PROPH/DIAG INJ SC/IM: CPT

## 2025-03-08 RX ORDER — KETOROLAC TROMETHAMINE 30 MG/ML
30 INJECTION, SOLUTION INTRAMUSCULAR; INTRAVENOUS ONCE
Status: COMPLETED | OUTPATIENT
Start: 2025-03-08 | End: 2025-03-08

## 2025-03-08 RX ORDER — INDOMETHACIN 25 MG/1
50 CAPSULE ORAL 3 TIMES DAILY PRN
Qty: 15 CAPSULE | Refills: 0 | Status: SHIPPED | OUTPATIENT
Start: 2025-03-08 | End: 2025-03-13

## 2025-03-08 RX ADMIN — KETOROLAC TROMETHAMINE 30 MG: 30 INJECTION, SOLUTION INTRAMUSCULAR at 12:04

## 2025-03-08 ASSESSMENT — PAIN - FUNCTIONAL ASSESSMENT: PAIN_FUNCTIONAL_ASSESSMENT: 0-10

## 2025-03-08 ASSESSMENT — PAIN SCALES - GENERAL: PAINLEVEL_OUTOF10: 9

## 2025-03-08 NOTE — DISCHARGE INSTRUCTIONS
Take the anti-inflammatory medication as directed.  Hold your aspirin while taking this medication.  Follow-up with orthopedic consider [place] on Monday for reevaluation.  They have a walk-in clinic that opens at 8 AM on the weekdays.    Elevate your leg is much as possible.    You should also make a follow-up appoint with your regular physician.  Your blood sugar was elevated here today.  Additionally, you can take over-the-counter Tylenol as directed on the bottle for additional pain control.    Discharge warning    Please remember that examination and testing performed in the emergency department is not a comprehensive evaluation of all medical conditions and does not replace the need to follow up with your primary care provider.  In the emergency department, we are only able to evaluate your symptoms in the current condition, but symptoms may change or worsen.  Although you are felt safe to be discharged today, if your symptoms persist or change, you need to be re-evaluated by your regular/primary care doctor as soon as possible.  If you are unable to make appointment with your regular doctor, please come back to the ER to be re-evaluated.

## 2025-03-08 NOTE — ED PROVIDER NOTES
[facility] EMERGENCY DEPARTMENT      EMERGENCY MEDICINE     Pt Name: Cristiano Stephens  MRN: [id number]  Birthdate 1945  Date of evaluation: 3/8/2025  Provider: BERNY Baeza    CHIEF COMPLAINT       Chief Complaint   Patient presents with    Foot Pain     HISTORY OF PRESENT ILLNESS   Cristiano Stephens is a pleasant 79 y.o. male who presents to the emergency department from home complaining of right ankle pain.  PASTMEDICAL HISTORY     Past Medical History:   Diagnosis Date    CAD (coronary artery disease)     Cancer (HCC)     skin basal cell    COPD (chronic obstructive pulmonary disease) (HCC)     Diabetes mellitus (HCC)     Hyperlipidemia     Hypertension     Prolonged emergence from general anesthesia     had apnea with surgery    Sleep apnea     has CPAP    Wheeze        Patient Active Problem List   Diagnosis Code    Spinal stenosis, lumbar region, with neurogenic claudication M48.062    Metabolic acidosis E87.20    Leukocytosis D72.829    Normocytic anemia D64.9    DM (diabetes mellitus), type 2, uncontrolled [id number]    HTN (hypertension) I10    MALIKA (obstructive sleep apnea) G47.33    HLD (hyperlipidemia) E78.5    Asthma J45.909    Depression F32.A    Constipation K59.00    H/O class III angina pectoris Z86.79    Ventricular arrhythmia I49.9    Paroxysmal atrial fibrillation (HCC) I48.0    Obesity due to excess calories E66.09    CAD in native artery I25.10    Angina pectoris associated with type 2 diabetes mellitus (HCC) E11.59, I20.9    Abnormal nuclear stress test R94.39     SURGICAL HISTORY       Past Surgical History:   Procedure Laterality Date    BACK SURGERY      back fusion 2015    CARDIAC SURGERY      4 stents; 2016    COLONOSCOPY N/A 8/14/2019    COLONOSCOPY POLYPECTOMY SNARE/COLD BIOPSY performed by Radha Malin MD at [facility]    COLONOSCOPY  8/14/2019    COLONOSCOPY SUBMUCOSAL/BOTOX INJECTION performed by Radha Malin MD at [facility]    DENTAL SURGERY      JOINT  tablet, R-3Normal      Multiple Vitamins-Minerals (THERAPEUTIC MULTIVITAMIN-MINERALS) tablet Take 1 tablet by mouth dailyHistorical Med      fluticasone-salmeterol (ADVAIR) 250-50 MCG/ACT AEPB diskus inhaler Inhale 1 puff into the lungs dailyHistorical Med      budesonide-formoterol (SYMBICORT) 160-4.5 MCG/ACT AERO Inhale 2 puffs into the lungs 2 times dailyHistorical Med      polyethyl glycol-propyl glycol 0.4-0.3 % (SYSTANE) 0.4-0.3 % ophthalmic solution 1 drop as needed for Dry EyesHistorical Med      PANTOPRAZOLE SODIUM PO Take 40 mg by mouth daily Historical Med      Omega-3 Fatty Acids (FISH OIL) 1000 MG CAPS Take 1 capsule by mouth dailyHistorical Med      gabapentin (NEURONTIN) 600 MG tablet Take 1 tablet by mouth 3 times daily.Historical Med      naproxen (NAPROSYN) 500 MG tablet Take 1 tablet by mouth as neededHistorical Med      metFORMIN (GLUCOPHAGE) 1000 MG tablet Take 1 tablet by mouth 2 times daily (with meals)Historical Med      PARoxetine (PAXIL) 30 MG tablet Take 2 tablets by mouth every morningHistorical Med      Albuterol Sulfate (PROVENTIL HFA IN) Inhale 2 puffs into the lungs 4 times daily as needed Historical Med             ALLERGIES     is allergic to bee venom.    FAMILY HISTORY     He indicated that his mother is . He indicated that his father is . He indicated that the status of his brother is unknown. He indicated that his maternal grandmother is . He indicated that his maternal grandfather is . He indicated that his paternal grandmother is . He indicated that his paternal grandfather is . He indicated that the status of his neg hx is unknown.       SOCIAL HISTORY       Social History     Tobacco Use    Smoking status: Former     Current packs/day: 0.00     Types: Cigarettes     Quit date: 1976     Years since quittin.2    Smokeless tobacco: Former   Vaping Use    Vaping status: Never Used   Substance Use Topics    Alcohol use: Yes

## 2025-03-08 NOTE — ED TRIAGE NOTES
Pt to ED with right foot and ankle pain. Pt states that a couple weeks ago he twisted the ankle and the pain and swelling has gotten worse. Pt has a hard time applying pressure to that foot. There is swelling noted .

## 2025-03-28 ENCOUNTER — TELEPHONE (OUTPATIENT)
Dept: CARDIOLOGY CLINIC | Age: 80
End: 2025-03-28

## 2025-03-28 NOTE — TELEPHONE ENCOUNTER
Pts wife calling, pt was seen in [place] ER yesterday for hematuria.  He was told at discharge to hold Eliquis and ASA.  [facility] who [facility] checking with Dr Woodard on holding the medications. [facility] is [facility] up with [facility]ist.  The bleeding comes and goes, is not all the time.   She is asking how long they should hold the meds.

## 2025-06-18 ENCOUNTER — OFFICE VISIT (OUTPATIENT)
Dept: CARDIOLOGY CLINIC | Age: 80
End: 2025-06-18
Payer: OTHER GOVERNMENT

## 2025-06-18 VITALS
SYSTOLIC BLOOD PRESSURE: 132 MMHG | DIASTOLIC BLOOD PRESSURE: 70 MMHG | HEIGHT: 72 IN | HEART RATE: 60 BPM | BODY MASS INDEX: 26.01 KG/M2 | WEIGHT: 192 LBS

## 2025-06-18 DIAGNOSIS — I25.83 CORONARY ARTERY DISEASE DUE TO LIPID RICH PLAQUE: Primary | ICD-10-CM

## 2025-06-18 DIAGNOSIS — I35.0 NONRHEUMATIC AORTIC VALVE STENOSIS: ICD-10-CM

## 2025-06-18 DIAGNOSIS — R06.02 SHORTNESS OF BREATH: ICD-10-CM

## 2025-06-18 DIAGNOSIS — I25.10 CORONARY ARTERY DISEASE DUE TO LIPID RICH PLAQUE: Primary | ICD-10-CM

## 2025-06-18 PROCEDURE — 3075F SYST BP GE 130 - 139MM HG: CPT | Performed by: INTERNAL MEDICINE

## 2025-06-18 PROCEDURE — 93000 ELECTROCARDIOGRAM COMPLETE: CPT | Performed by: INTERNAL MEDICINE

## 2025-06-18 PROCEDURE — 3078F DIAST BP <80 MM HG: CPT | Performed by: INTERNAL MEDICINE

## 2025-06-18 PROCEDURE — 1123F ACP DISCUSS/DSCN MKR DOCD: CPT | Performed by: INTERNAL MEDICINE

## 2025-06-18 PROCEDURE — 99214 OFFICE O/P EST MOD 30 MIN: CPT | Performed by: INTERNAL MEDICINE

## 2025-06-18 RX ORDER — CLOPIDOGREL BISULFATE 75 MG/1
75 TABLET ORAL DAILY
Qty: 30 TABLET | Refills: 0 | Status: SHIPPED | OUTPATIENT
Start: 2025-06-18

## 2025-06-18 RX ORDER — NITROGLYCERIN 0.4 MG/1
0.4 TABLET SUBLINGUAL EVERY 5 MIN PRN
Qty: 25 TABLET | Refills: 3 | Status: SHIPPED | OUTPATIENT
Start: 2025-06-18

## 2025-06-18 RX ORDER — FLUTICASONE PROPIONATE AND SALMETEROL 100; 50 UG/1; UG/1
1 POWDER RESPIRATORY (INHALATION) EVERY 12 HOURS
COMMUNITY

## 2025-06-18 RX ORDER — CLOPIDOGREL BISULFATE 75 MG/1
75 TABLET ORAL DAILY
COMMUNITY
End: 2025-06-18 | Stop reason: SDUPTHER

## 2025-06-18 RX ORDER — CLOPIDOGREL BISULFATE 75 MG/1
75 TABLET ORAL DAILY
Qty: 90 TABLET | Refills: 3 | Status: SHIPPED | OUTPATIENT
Start: 2025-06-18

## 2025-06-18 RX ORDER — SACUBITRIL AND VALSARTAN 49; 51 MG/1; MG/1
1 TABLET, FILM COATED ORAL 2 TIMES DAILY
Qty: 180 TABLET | Refills: 3 | Status: SHIPPED | OUTPATIENT
Start: 2025-06-18

## 2025-06-18 RX ORDER — CARVEDILOL 6.25 MG/1
6.25 TABLET ORAL 2 TIMES DAILY WITH MEALS
Qty: 180 TABLET | Refills: 3 | Status: SHIPPED | OUTPATIENT
Start: 2025-06-18

## 2025-06-18 RX ORDER — FINASTERIDE 5 MG/1
5 TABLET, FILM COATED ORAL DAILY
COMMUNITY
Start: 2025-06-05

## 2025-06-18 NOTE — PROGRESS NOTES
Pt here for 1 yr check up     EKG done today     Pt has not had plavix for over a week , some meds not sure if on carvedilol not refilled since 1/2024 but pt thinks is taking     Pt continues with bilateral leg pain

## 2025-06-18 NOTE — PROGRESS NOTES
[facility] [place] [facility]  [facility]  [address]  [facility] [address]  Dept: [phone]  Dept Fax: [phone]  Loc: [phone]    Visit Date: 6/18/2025    Chief Complaint   Patient presents with    Follow-up    Coronary Artery Disease       HPI:   Mr. Stephens is a 80 y.o. male  who presented for:  hx of HTN, CAD s/p multiple PCI, CHF, DM is here for a follow up.  [facility]. EKG is Sr, PVC. 2017 Echo 40-45%. Had cutting balloon angioplasty of the proximal LCx.   Last EF 50-55%  History of Present Illness  The patient presents for evaluation of hematuria, coronary artery disease, diabetes mellitus, congestive heart failure, and benign prostatic hyperplasia.    He experienced an episode of bright red hematuria lasting 3 days, which prompted a visit to the emergency room. Subsequently, he was referred to a [facility]rologist in [place] by the [facility]. A cystoscopy revealed numerous blood vessels in his bladder, and scans indicated an enlarged prostate, approximately three times its normal size.    He reports no significant chest pain or shortness of breath but occasionally experiences a mild twinge. He estimates that he requires nitroglycerin approximately once a month. He has been off Plavix for over a week due to running out of the medication.    [facility] by the [facility]. He is on Jardiance twice daily.    He has a history of congestive heart failure, which has shown improvement. He is currently on Entresto.    His urinary stream is moderate. He has a scheduled follow-up appointment with his urologist in 09/2025. The patient has an enlarged prostate.    He has a hip problem and can walk about half a block.    PAST SURGICAL HISTORY:  Multiple stents placement, balloon angioplasty    SOCIAL HISTORY  Exercise: Walks about half a block due to hip issues.      Current Outpatient Medications:     FIBER GUMMIES PO, Take by mouth in the morning, at noon, and at

## 2025-08-11 RX ORDER — NITROGLYCERIN 0.4 MG/1
0.4 TABLET SUBLINGUAL EVERY 5 MIN PRN
Qty: 25 TABLET | Refills: 3 | Status: SHIPPED | OUTPATIENT
Start: 2025-08-11

## 2025-08-14 RX ORDER — ATORVASTATIN CALCIUM 80 MG/1
80 TABLET, FILM COATED ORAL DAILY
Qty: 90 TABLET | Refills: 3 | Status: SHIPPED | OUTPATIENT
Start: 2025-08-14

## 2025-08-14 RX ORDER — EZETIMIBE 10 MG/1
10 TABLET ORAL DAILY
Qty: 90 TABLET | Refills: 3 | Status: SHIPPED | OUTPATIENT
Start: 2025-08-14

## (undated) DEVICE — TAPE ADH W4INXL5YD WHT COT E BNDG RUB BASE CURAD

## (undated) DEVICE — Device: Brand: SPOT EX ENDOSCOPIC TATTOO

## (undated) DEVICE — SOLUTION IV 1000ML 0.9% SOD CHL PH 5 INJ USP VIAFLX PLAS

## (undated) DEVICE — [ARTHROSCOPY PUMP,  DO NOT USE IF PACKAGE IS DAMAGED,  KEEP DRY,  KEEP AWAY FROM SUNLIGHT,  PROTECT FROM HEAT AND RADIOACTIVE SOURCES.]: Brand: FLOSTEADY

## (undated) DEVICE — POSITIONER HD W8XH4XL8.5IN RASPBERRY FOAM SLT

## (undated) DEVICE — CHLORAPREP 26ML CLEAR

## (undated) DEVICE — GLOVE ORANGE PI 8   MSG9080

## (undated) DEVICE — SUREFIT, DUAL DISPERSIVE ELECTRODE, CONTACT QUALITY MONITOR: Brand: SUREFIT

## (undated) DEVICE — LINER SUCT CANSTR 1500CC SEMI RIG W/ POR HYDROPHOBIC SHUT

## (undated) DEVICE — NEEDLE SPNL L3.5IN PNK HUB S STL REG WALL FIT STYL W/ QNCKE

## (undated) DEVICE — 3M™ IOBAN™ 2 ANTIMICROBIAL INCISE DRAPE 6650EZ: Brand: IOBAN™ 2

## (undated) DEVICE — SET ADMIN 25ML L117IN PMP MOD CK VLV RLER CLMP 2 SMRTSITE

## (undated) DEVICE — PAD,ABDOMINAL,5"X9",ST,LF,25/BX: Brand: MEDLINE INDUSTRIES, INC.

## (undated) DEVICE — IV START KIT: Brand: MEDLINE INDUSTRIES, INC.

## (undated) DEVICE — COVER ARMBRD W13XL28.5IN IMPERV BLU FOR OP RM

## (undated) DEVICE — GOWN,SIRUS,NON REINFRCD,LARGE,SET IN SL: Brand: MEDLINE

## (undated) DEVICE — PACK PROCEDURE SURG SET UP SRMC

## (undated) DEVICE — FORCEPS BX L240CM JAW DIA3.2MM L CAP W/ NDL MIC MESH TOOTH

## (undated) DEVICE — Device

## (undated) DEVICE — BASIC SINGLE BASIN BTC-LF: Brand: MEDLINE INDUSTRIES, INC.

## (undated) DEVICE — SLIDER SUT NDL NIT SHUTTLE CHAMPION

## (undated) DEVICE — ENDO KIT: Brand: MEDLINE INDUSTRIES, INC.

## (undated) DEVICE — SYRINGE, 10ML SALINE IN 10ML: Brand: MEDLINE

## (undated) DEVICE — 4-PORT MANIFOLD: Brand: NEPTUNE 2

## (undated) DEVICE — OPES ASPIRATING ABLATOR COOL CUT

## (undated) DEVICE — SLING ARM L 33-38CM BLK MESH FAB EZ OPN FR PNL W/

## (undated) DEVICE — SLEEVE TRAC FOAM COBAN DISP FOR 3 PNT SHLDR DISTR SYS STAR

## (undated) DEVICE — TUBING IV STOPCOCK 48 CM 3 W

## (undated) DEVICE — SOLUTION IV 1000ML 0.45% SOD CHL PH 5 INJ USP VIAFLX PLAS

## (undated) DEVICE — [AGGRESSIVE PLUS CUTTER, ARTHROSCOPIC SHAVER BLADE,  DO NOT RESTERILIZE,  DO NOT USE IF PACKAGE IS DAMAGED,  KEEP DRY,  KEEP AWAY FROM SUNLIGHT]: Brand: FORMULA

## (undated) DEVICE — GAUZE,SPONGE,8"X4",12PLY,XRAY,STRL,LF: Brand: MEDLINE

## (undated) DEVICE — CONNECTOR TBNG AUX H2O JET DISP FOR OLY 160/180 SER

## (undated) DEVICE — NEEDLE SCLERO 23GA L4MM CATH L240CM CNTRST SHTH DIA1.8MM

## (undated) DEVICE — GLOVE ORANGE PI 7 1/2   MSG9075

## (undated) DEVICE — SET LNR RED GRN W/ BASE CLEANASCOPE

## (undated) DEVICE — TUBING, SUCTION, 1/4" X 20', STRAIGHT: Brand: MEDLINE INDUSTRIES, INC.

## (undated) DEVICE — CATHETER ETER IV 22GA L1IN POLYUR STR RADPQ INTROCAN SFTY

## (undated) DEVICE — CONMED SCOPE SAVER BITE BLOCK, 20X27 MM: Brand: SCOPE SAVER

## (undated) DEVICE — IMPREGNATED GAUZE DRESSING: Brand: CUTICERIN 7.5X20CM CTN 50

## (undated) DEVICE — 3M™ BAIR HUGGER® MULTI ACCESS BLANKET, PEDIATRIC, FULL BODY, 10 PER CASE 31000: Brand: BAIR HUGGER™

## (undated) DEVICE — GLOVE SURG SZ 65 THK91MIL LTX FREE SYN POLYISOPRENE

## (undated) DEVICE — Z INACTIVE USE 2660664 SOLUTION IRRIG 3000ML 0.9% SOD CHL USP UROMATIC PLAS CONT